# Patient Record
Sex: FEMALE | Race: OTHER | Employment: UNEMPLOYED | URBAN - NONMETROPOLITAN AREA
[De-identification: names, ages, dates, MRNs, and addresses within clinical notes are randomized per-mention and may not be internally consistent; named-entity substitution may affect disease eponyms.]

---

## 2016-12-12 LAB
CHOLESTEROL, TOTAL: 197 MG/DL
CHOLESTEROL/HDL RATIO: 2.4
HBA1C MFR BLD: 5.7 %
HDLC SERPL-MCNC: 82 MG/DL (ref 35–70)
LDL CHOLESTEROL CALCULATED: 97 MG/DL (ref 0–160)
TRIGL SERPL-MCNC: 87 MG/DL
VLDLC SERPL CALC-MCNC: ABNORMAL MG/DL

## 2017-01-07 ENCOUNTER — NURSE TRIAGE (OUTPATIENT)
Dept: ADMINISTRATIVE | Age: 52
End: 2017-01-07

## 2017-01-17 ENCOUNTER — OFFICE VISIT (OUTPATIENT)
Dept: FAMILY MEDICINE CLINIC | Age: 52
End: 2017-01-17

## 2017-01-17 ENCOUNTER — TELEPHONE (OUTPATIENT)
Dept: FAMILY MEDICINE CLINIC | Age: 52
End: 2017-01-17

## 2017-01-17 VITALS
TEMPERATURE: 98.4 F | SYSTOLIC BLOOD PRESSURE: 120 MMHG | WEIGHT: 127 LBS | HEART RATE: 80 BPM | DIASTOLIC BLOOD PRESSURE: 84 MMHG | BODY MASS INDEX: 24.8 KG/M2 | RESPIRATION RATE: 20 BRPM

## 2017-01-17 DIAGNOSIS — J01.00 SUBACUTE MAXILLARY SINUSITIS: Primary | ICD-10-CM

## 2017-01-17 DIAGNOSIS — F06.31 DEPRESSIVE DISORDER DUE TO ANOTHER MEDICAL CONDITION WITH DEPRESSIVE FEATURES: ICD-10-CM

## 2017-01-17 DIAGNOSIS — G89.29 CHRONIC NECK AND BACK PAIN: ICD-10-CM

## 2017-01-17 DIAGNOSIS — H65.00 ACUTE SEROUS OTITIS MEDIA, RECURRENCE NOT SPECIFIED, UNSPECIFIED LATERALITY: ICD-10-CM

## 2017-01-17 DIAGNOSIS — M54.9 CHRONIC NECK AND BACK PAIN: ICD-10-CM

## 2017-01-17 DIAGNOSIS — E03.8 OTHER SPECIFIED HYPOTHYROIDISM: Chronic | ICD-10-CM

## 2017-01-17 DIAGNOSIS — R31.9 HEMATURIA: ICD-10-CM

## 2017-01-17 DIAGNOSIS — M54.2 CHRONIC NECK AND BACK PAIN: ICD-10-CM

## 2017-01-17 LAB
BILIRUBIN, POC: ABNORMAL
BLOOD URINE, POC: ABNORMAL
CLARITY, POC: CLEAR
COLOR, POC: YELLOW
GLUCOSE URINE, POC: ABNORMAL
KETONES, POC: ABNORMAL
LEUKOCYTE EST, POC: ABNORMAL
NITRITE, POC: ABNORMAL
PH, POC: 7
PROTEIN, POC: ABNORMAL
SPECIFIC GRAVITY, POC: 1.02
UROBILINOGEN, POC: 0.2

## 2017-01-17 PROCEDURE — 96372 THER/PROPH/DIAG INJ SC/IM: CPT | Performed by: NURSE PRACTITIONER

## 2017-01-17 PROCEDURE — 81003 URINALYSIS AUTO W/O SCOPE: CPT | Performed by: NURSE PRACTITIONER

## 2017-01-17 PROCEDURE — 99213 OFFICE O/P EST LOW 20 MIN: CPT | Performed by: NURSE PRACTITIONER

## 2017-01-17 RX ORDER — ALPRAZOLAM 0.5 MG/1
0.5 TABLET ORAL 3 TIMES DAILY PRN
Qty: 90 TABLET | Refills: 0 | Status: SHIPPED | OUTPATIENT
Start: 2017-01-17 | End: 2017-05-08 | Stop reason: SDUPTHER

## 2017-01-17 RX ORDER — METHYLPREDNISOLONE ACETATE 80 MG/ML
120 INJECTION, SUSPENSION INTRA-ARTICULAR; INTRALESIONAL; INTRAMUSCULAR; SOFT TISSUE ONCE
Status: COMPLETED | OUTPATIENT
Start: 2017-01-17 | End: 2017-01-17

## 2017-01-17 RX ORDER — CIPROFLOXACIN 500 MG/1
500 TABLET, FILM COATED ORAL 2 TIMES DAILY
Qty: 20 TABLET | Refills: 0 | Status: SHIPPED | OUTPATIENT
Start: 2017-01-17 | End: 2017-01-27

## 2017-01-17 RX ORDER — DIAZEPAM 5 MG/1
5 TABLET ORAL EVERY 12 HOURS PRN
Qty: 30 TABLET | Refills: 0 | Status: SHIPPED | OUTPATIENT
Start: 2017-01-17 | End: 2017-05-08 | Stop reason: SDUPTHER

## 2017-01-17 RX ADMIN — METHYLPREDNISOLONE ACETATE 120 MG: 80 INJECTION, SUSPENSION INTRA-ARTICULAR; INTRALESIONAL; INTRAMUSCULAR; SOFT TISSUE at 14:58

## 2017-01-19 ASSESSMENT — ENCOUNTER SYMPTOMS
GASTROINTESTINAL NEGATIVE: 1
SINUS PRESSURE: 1
ALLERGIC/IMMUNOLOGIC NEGATIVE: 1
BACK PAIN: 1
SINUS COMPLAINT: 1
FACIAL SWELLING: 1
SHORTNESS OF BREATH: 0
COUGH: 1

## 2017-01-31 ENCOUNTER — TELEPHONE (OUTPATIENT)
Dept: FAMILY MEDICINE CLINIC | Age: 52
End: 2017-01-31

## 2017-01-31 RX ORDER — LEVOTHYROXINE SODIUM 112 UG/1
TABLET ORAL
Qty: 90 TABLET | Refills: 3 | Status: SHIPPED | OUTPATIENT
Start: 2017-01-31 | End: 2018-02-13 | Stop reason: SDUPTHER

## 2017-01-31 RX ORDER — LEVOTHYROXINE SODIUM 112 UG/1
TABLET ORAL
Qty: 90 TABLET | Refills: 3 | OUTPATIENT
Start: 2017-01-31

## 2017-02-01 RX ORDER — CYCLOBENZAPRINE HCL 10 MG
10 TABLET ORAL EVERY 8 HOURS PRN
Qty: 30 TABLET | Refills: 0 | Status: SHIPPED | OUTPATIENT
Start: 2017-02-01 | End: 2017-02-11

## 2017-03-03 ENCOUNTER — TELEPHONE (OUTPATIENT)
Dept: FAMILY MEDICINE CLINIC | Age: 52
End: 2017-03-03

## 2017-03-22 ENCOUNTER — OFFICE VISIT (OUTPATIENT)
Dept: PHYSICAL MEDICINE AND REHAB | Age: 52
End: 2017-03-22

## 2017-03-22 VITALS — HEIGHT: 60 IN | WEIGHT: 126 LBS | BODY MASS INDEX: 24.74 KG/M2

## 2017-03-22 DIAGNOSIS — M47.26 OSTEOARTHRITIS OF SPINE WITH RADICULOPATHY, LUMBAR REGION: ICD-10-CM

## 2017-03-22 DIAGNOSIS — M54.12 CERVICAL RADICULITIS: Primary | ICD-10-CM

## 2017-03-22 DIAGNOSIS — M96.1 FAILED BACK SYNDROME, CERVICAL: ICD-10-CM

## 2017-03-22 DIAGNOSIS — M54.16 LUMBAR RADICULITIS: ICD-10-CM

## 2017-03-22 DIAGNOSIS — M79.18 CERVICAL MYOFASCIAL PAIN SYNDROME: ICD-10-CM

## 2017-03-22 DIAGNOSIS — G89.4 CHRONIC PAIN SYNDROME: ICD-10-CM

## 2017-03-22 PROCEDURE — 99213 OFFICE O/P EST LOW 20 MIN: CPT | Performed by: PAIN MEDICINE

## 2017-03-22 ASSESSMENT — ENCOUNTER SYMPTOMS
PHOTOPHOBIA: 0
NAUSEA: 0
ABDOMINAL PAIN: 0
EYE PAIN: 0
WHEEZING: 0
VOMITING: 0
COLOR CHANGE: 0
SHORTNESS OF BREATH: 0
CONSTIPATION: 0
SINUS PRESSURE: 0
RHINORRHEA: 0
SORE THROAT: 0
DIARRHEA: 0
BACK PAIN: 0
COUGH: 0
CHEST TIGHTNESS: 0

## 2017-05-05 RX ORDER — DIAZEPAM 5 MG/1
5 TABLET ORAL EVERY 12 HOURS PRN
Qty: 30 TABLET | Refills: 0 | Status: CANCELLED | OUTPATIENT
Start: 2017-05-05

## 2017-05-05 RX ORDER — CYCLOBENZAPRINE HCL 10 MG
10 TABLET ORAL NIGHTLY
Qty: 10 TABLET | Refills: 0 | Status: CANCELLED | OUTPATIENT
Start: 2017-05-05 | End: 2017-05-15

## 2017-05-05 RX ORDER — ALPRAZOLAM 0.5 MG/1
0.5 TABLET ORAL 3 TIMES DAILY PRN
Qty: 90 TABLET | Refills: 0 | Status: CANCELLED | OUTPATIENT
Start: 2017-05-05

## 2017-05-08 ENCOUNTER — OFFICE VISIT (OUTPATIENT)
Dept: FAMILY MEDICINE CLINIC | Age: 52
End: 2017-05-08

## 2017-05-08 VITALS
TEMPERATURE: 98.5 F | SYSTOLIC BLOOD PRESSURE: 120 MMHG | WEIGHT: 128 LBS | RESPIRATION RATE: 16 BRPM | DIASTOLIC BLOOD PRESSURE: 78 MMHG | BODY MASS INDEX: 25.13 KG/M2 | HEART RATE: 84 BPM

## 2017-05-08 DIAGNOSIS — I10 ESSENTIAL HYPERTENSION: Chronic | ICD-10-CM

## 2017-05-08 DIAGNOSIS — B35.4 TINEA CORPORIS: ICD-10-CM

## 2017-05-08 DIAGNOSIS — M96.1 FAILED NECK SYNDROME: ICD-10-CM

## 2017-05-08 DIAGNOSIS — F32.A DEPRESSION, UNSPECIFIED DEPRESSION TYPE: ICD-10-CM

## 2017-05-08 DIAGNOSIS — J30.2 SEASONAL ALLERGIC RHINITIS, UNSPECIFIED ALLERGIC RHINITIS TRIGGER: ICD-10-CM

## 2017-05-08 DIAGNOSIS — Z13.9 SCREENING: ICD-10-CM

## 2017-05-08 DIAGNOSIS — M96.1 FAILED BACK SYNDROME: ICD-10-CM

## 2017-05-08 DIAGNOSIS — E03.8 OTHER SPECIFIED HYPOTHYROIDISM: Chronic | ICD-10-CM

## 2017-05-08 DIAGNOSIS — F41.1 GENERALIZED ANXIETY DISORDER: Primary | ICD-10-CM

## 2017-05-08 DIAGNOSIS — G47.9 SLEEP DISTURBANCE: ICD-10-CM

## 2017-05-08 DIAGNOSIS — M51.36 DDD (DEGENERATIVE DISC DISEASE), LUMBAR: ICD-10-CM

## 2017-05-08 PROCEDURE — 96372 THER/PROPH/DIAG INJ SC/IM: CPT | Performed by: NURSE PRACTITIONER

## 2017-05-08 PROCEDURE — 99213 OFFICE O/P EST LOW 20 MIN: CPT | Performed by: NURSE PRACTITIONER

## 2017-05-08 RX ORDER — CYCLOBENZAPRINE HCL 10 MG
10 TABLET ORAL NIGHTLY PRN
COMMUNITY
End: 2017-05-08 | Stop reason: SDUPTHER

## 2017-05-08 RX ORDER — METHYLPREDNISOLONE ACETATE 80 MG/ML
120 INJECTION, SUSPENSION INTRA-ARTICULAR; INTRALESIONAL; INTRAMUSCULAR; SOFT TISSUE ONCE
Status: COMPLETED | OUTPATIENT
Start: 2017-05-08 | End: 2017-05-08

## 2017-05-08 RX ORDER — BUPROPION HYDROCHLORIDE 100 MG/1
100 TABLET ORAL 2 TIMES DAILY
Qty: 60 TABLET | Refills: 3 | Status: SHIPPED | OUTPATIENT
Start: 2017-05-08 | End: 2017-11-20

## 2017-05-08 RX ORDER — CYCLOBENZAPRINE HCL 10 MG
10 TABLET ORAL NIGHTLY PRN
Qty: 30 TABLET | Refills: 1 | Status: SHIPPED | OUTPATIENT
Start: 2017-05-08 | End: 2017-07-17 | Stop reason: SDUPTHER

## 2017-05-08 RX ORDER — KETOCONAZOLE 20 MG/G
CREAM TOPICAL
Qty: 30 G | Refills: 1 | Status: SHIPPED | OUTPATIENT
Start: 2017-05-08 | End: 2017-08-23 | Stop reason: ALTCHOICE

## 2017-05-08 RX ORDER — LISINOPRIL 20 MG/1
20 TABLET ORAL DAILY
Qty: 30 TABLET | Refills: 5 | Status: SHIPPED | OUTPATIENT
Start: 2017-05-08 | End: 2018-02-20 | Stop reason: SDUPTHER

## 2017-05-08 RX ORDER — DIAZEPAM 5 MG/1
5 TABLET ORAL EVERY 12 HOURS PRN
Qty: 30 TABLET | Refills: 0 | Status: SHIPPED | OUTPATIENT
Start: 2017-05-08 | End: 2017-07-17 | Stop reason: SDUPTHER

## 2017-05-08 RX ORDER — ALPRAZOLAM 0.5 MG/1
0.5 TABLET ORAL 3 TIMES DAILY PRN
Qty: 90 TABLET | Refills: 0 | Status: SHIPPED | OUTPATIENT
Start: 2017-05-08 | End: 2017-07-17 | Stop reason: SDUPTHER

## 2017-05-08 RX ADMIN — METHYLPREDNISOLONE ACETATE 120 MG: 80 INJECTION, SUSPENSION INTRA-ARTICULAR; INTRALESIONAL; INTRAMUSCULAR; SOFT TISSUE at 12:11

## 2017-05-11 ASSESSMENT — ENCOUNTER SYMPTOMS
EYES NEGATIVE: 1
BACK PAIN: 1
SINUS COMPLAINT: 1
GASTROINTESTINAL NEGATIVE: 1
RESPIRATORY NEGATIVE: 1

## 2017-07-18 RX ORDER — CYCLOBENZAPRINE HCL 10 MG
10 TABLET ORAL NIGHTLY PRN
Qty: 30 TABLET | Refills: 1 | Status: SHIPPED | OUTPATIENT
Start: 2017-07-18 | End: 2017-09-18 | Stop reason: SDUPTHER

## 2017-07-18 RX ORDER — ALPRAZOLAM 0.5 MG/1
0.5 TABLET ORAL 3 TIMES DAILY PRN
Qty: 90 TABLET | Refills: 0 | Status: SHIPPED | OUTPATIENT
Start: 2017-07-18 | End: 2017-09-18 | Stop reason: SDUPTHER

## 2017-07-18 RX ORDER — DIAZEPAM 5 MG/1
5 TABLET ORAL EVERY 12 HOURS PRN
Qty: 30 TABLET | Refills: 0 | Status: SHIPPED | OUTPATIENT
Start: 2017-07-18 | End: 2017-09-18 | Stop reason: SDUPTHER

## 2017-08-23 ENCOUNTER — OFFICE VISIT (OUTPATIENT)
Dept: FAMILY MEDICINE CLINIC | Age: 52
End: 2017-08-23
Payer: COMMERCIAL

## 2017-08-23 VITALS
WEIGHT: 126 LBS | BODY MASS INDEX: 24.74 KG/M2 | DIASTOLIC BLOOD PRESSURE: 80 MMHG | SYSTOLIC BLOOD PRESSURE: 130 MMHG | RESPIRATION RATE: 16 BRPM | HEART RATE: 76 BPM

## 2017-08-23 DIAGNOSIS — M85.89 OSTEOPENIA OF MULTIPLE SITES: ICD-10-CM

## 2017-08-23 DIAGNOSIS — Z12.11 SCREEN FOR COLON CANCER: ICD-10-CM

## 2017-08-23 DIAGNOSIS — M54.17 LUMBOSACRAL RADICULOPATHY: Primary | ICD-10-CM

## 2017-08-23 DIAGNOSIS — K59.04 CHRONIC IDIOPATHIC CONSTIPATION: ICD-10-CM

## 2017-08-23 DIAGNOSIS — Z12.39 SCREENING FOR BREAST CANCER: ICD-10-CM

## 2017-08-23 DIAGNOSIS — E03.9 HYPOTHYROIDISM, UNSPECIFIED TYPE: Chronic | ICD-10-CM

## 2017-08-23 PROCEDURE — 96372 THER/PROPH/DIAG INJ SC/IM: CPT | Performed by: NURSE PRACTITIONER

## 2017-08-23 PROCEDURE — 99214 OFFICE O/P EST MOD 30 MIN: CPT | Performed by: NURSE PRACTITIONER

## 2017-08-23 RX ORDER — METHYLPREDNISOLONE ACETATE 80 MG/ML
120 INJECTION, SUSPENSION INTRA-ARTICULAR; INTRALESIONAL; INTRAMUSCULAR; SOFT TISSUE ONCE
Status: COMPLETED | OUTPATIENT
Start: 2017-08-23 | End: 2017-08-23

## 2017-08-23 RX ADMIN — METHYLPREDNISOLONE ACETATE 120 MG: 80 INJECTION, SUSPENSION INTRA-ARTICULAR; INTRALESIONAL; INTRAMUSCULAR; SOFT TISSUE at 12:41

## 2017-08-25 DIAGNOSIS — Z12.11 SCREEN FOR COLON CANCER: ICD-10-CM

## 2017-08-25 LAB
CONTROL: PRESENT
HEMOCCULT STL QL: NEGATIVE

## 2017-08-25 PROCEDURE — 82274 ASSAY TEST FOR BLOOD FECAL: CPT | Performed by: NURSE PRACTITIONER

## 2017-08-29 ENCOUNTER — TELEPHONE (OUTPATIENT)
Dept: FAMILY MEDICINE CLINIC | Age: 52
End: 2017-08-29

## 2017-08-31 ASSESSMENT — ENCOUNTER SYMPTOMS
BACK PAIN: 1
CONSTIPATION: 1

## 2017-09-18 RX ORDER — BUTALBITAL, ACETAMINOPHEN AND CAFFEINE 50; 325; 40 MG/1; MG/1; MG/1
1-2 TABLET ORAL EVERY 6 HOURS PRN
Qty: 180 TABLET | Refills: 1 | Status: SHIPPED | OUTPATIENT
Start: 2017-09-18 | End: 2018-02-20 | Stop reason: SDUPTHER

## 2017-09-18 RX ORDER — DIAZEPAM 5 MG/1
5 TABLET ORAL EVERY 12 HOURS PRN
Qty: 30 TABLET | Refills: 0 | Status: SHIPPED | OUTPATIENT
Start: 2017-09-18 | End: 2017-11-20 | Stop reason: SDUPTHER

## 2017-09-18 RX ORDER — CYCLOBENZAPRINE HCL 10 MG
10 TABLET ORAL NIGHTLY PRN
Qty: 30 TABLET | Refills: 1 | Status: SHIPPED | OUTPATIENT
Start: 2017-09-18 | End: 2018-08-14 | Stop reason: SDUPTHER

## 2017-09-18 RX ORDER — ALPRAZOLAM 0.5 MG/1
0.5 TABLET ORAL 3 TIMES DAILY PRN
Qty: 90 TABLET | Refills: 0 | Status: SHIPPED | OUTPATIENT
Start: 2017-09-18 | End: 2017-11-20 | Stop reason: SDUPTHER

## 2017-10-05 ENCOUNTER — HOSPITAL ENCOUNTER (OUTPATIENT)
Dept: WOMENS IMAGING | Age: 52
Discharge: HOME | End: 2017-10-05
Payer: COMMERCIAL

## 2017-10-05 DIAGNOSIS — M85.89 OSTEOPENIA OF MULTIPLE SITES: ICD-10-CM

## 2017-10-05 DIAGNOSIS — Z12.39 SCREENING FOR BREAST CANCER: ICD-10-CM

## 2017-10-05 PROCEDURE — 77063 BREAST TOMOSYNTHESIS BI: CPT

## 2017-10-05 PROCEDURE — 77080 DXA BONE DENSITY AXIAL: CPT

## 2017-10-17 ENCOUNTER — TELEPHONE (OUTPATIENT)
Dept: FAMILY MEDICINE CLINIC | Age: 52
End: 2017-10-17

## 2017-10-17 RX ORDER — PREDNISONE 1 MG/1
TABLET ORAL
Qty: 30 TABLET | Refills: 0 | Status: SHIPPED | OUTPATIENT
Start: 2017-10-17 | End: 2017-10-27

## 2017-10-17 NOTE — TELEPHONE ENCOUNTER
Beatriz Jacquie calls with a lot of back pain. She does not want to go to the emergency room because they won't do anything for her. She can barely move. Lays on the couch all day. Her left hip area is worse than before. She said Margi Peck gave her a shot a couple months ago so she thinks its too soon for another one. Using patches, Lidocaine, ice/heat, and nothing is helping. She is asking if she can get Prednisone or anything to help? Please advise.     [facility] on [address] [place] [address] 8/23/17

## 2017-11-08 ENCOUNTER — OFFICE VISIT (OUTPATIENT)
Dept: PSYCHIATRY | Age: 52
End: 2017-11-08
Payer: COMMERCIAL

## 2017-11-08 DIAGNOSIS — F06.31 DEPRESSIVE DISORDER DUE TO ANOTHER MEDICAL CONDITION WITH DEPRESSIVE FEATURES: Primary | ICD-10-CM

## 2017-11-08 PROCEDURE — 99204 OFFICE O/P NEW MOD 45 MIN: CPT | Performed by: NURSE PRACTITIONER

## 2017-11-08 RX ORDER — BUPROPION HYDROCHLORIDE 300 MG/1
300 TABLET ORAL EVERY MORNING
Qty: 30 TABLET | Refills: 0 | Status: SHIPPED | OUTPATIENT
Start: 2017-11-08 | End: 2017-12-13 | Stop reason: SDUPTHER

## 2017-11-08 NOTE — PROGRESS NOTES
1992    MSE:  Level of consciousness: Alert  Appearance: in chair and fair grooming   Behavior/Motor: no abnormalities noted   Attitude toward examiner: cooperative   Speech: Normal volume, goal directed, NRR  Mood: Euthymic  Affect: Reactive  Thought processes: Linear and goal directed   Suicidal Ideation: Denies suicidal ideations  Homicidal ideation: Denies homicidal ideations  Delusions: No evidence of delusions is observed  Perceptual Disturbance: Denies AH/VH;  No evidence of psychosis is observed. Cognition: Oriented to person, place, time and situation   Concentration fair   Memory intact   Insight: Fair  Judgment: Fair    ROS:  Constitutional: Negative for fever, chills and fatigue. HENT: Negative for ear pain, congestion, rhinorrhea and neck pain. Eyes: Negative for pain and discharge. Respiratory: Negative for cough, chest tightness and wheezing. Cardiovascular: Negative for chest pain, palpitations and leg swelling. Gastrointestinal: Negative for nausea, vomiting and diarrhea. Genitourinary: Negative for dysuria and frequency. Musculoskeletal: Negative for myalgias, back pain and arthralgias. Skin: Negative for rash. Neurological: Negative for dizziness, weakness and headaches. Impression:  Depressive D/O related to another medical condition    Plan:  Start Wellbutrin XL 300mg po q am  D/C Wellbutrin 100mg po BID  Advised to continue counseling  CBC with Diff, CMP, TSH.  Lipids, Vit D draw asap  RTC 4 weeks   Review of Systems    Objective:   Physical Exam    Assessment:            Plan:

## 2017-11-16 ENCOUNTER — TELEPHONE (OUTPATIENT)
Dept: FAMILY MEDICINE CLINIC | Age: 52
End: 2017-11-16

## 2017-11-16 RX ORDER — DIAZEPAM 5 MG/1
5 TABLET ORAL EVERY 12 HOURS PRN
Qty: 30 TABLET | Refills: 0 | OUTPATIENT
Start: 2017-11-16

## 2017-11-16 RX ORDER — ALPRAZOLAM 0.5 MG/1
0.5 TABLET ORAL 3 TIMES DAILY PRN
Qty: 90 TABLET | Refills: 0 | OUTPATIENT
Start: 2017-11-16

## 2017-11-20 ENCOUNTER — OFFICE VISIT (OUTPATIENT)
Dept: FAMILY MEDICINE CLINIC | Age: 52
End: 2017-11-20
Payer: COMMERCIAL

## 2017-11-20 VITALS
DIASTOLIC BLOOD PRESSURE: 80 MMHG | RESPIRATION RATE: 20 BRPM | TEMPERATURE: 98.4 F | SYSTOLIC BLOOD PRESSURE: 140 MMHG | HEIGHT: 61 IN | BODY MASS INDEX: 23.6 KG/M2 | WEIGHT: 125 LBS | HEART RATE: 68 BPM

## 2017-11-20 DIAGNOSIS — M96.1 FAILED BACK SYNDROME OF CERVICAL SPINE: ICD-10-CM

## 2017-11-20 DIAGNOSIS — F43.23 SITUATIONAL MIXED ANXIETY AND DEPRESSIVE DISORDER: ICD-10-CM

## 2017-11-20 DIAGNOSIS — M96.1 FAILED BACK SYNDROME, LUMBAR: ICD-10-CM

## 2017-11-20 DIAGNOSIS — J40 BRONCHITIS: Primary | ICD-10-CM

## 2017-11-20 DIAGNOSIS — J01.90 ACUTE SINUSITIS, RECURRENCE NOT SPECIFIED, UNSPECIFIED LOCATION: ICD-10-CM

## 2017-11-20 PROCEDURE — 99213 OFFICE O/P EST LOW 20 MIN: CPT | Performed by: NURSE PRACTITIONER

## 2017-11-20 RX ORDER — PREDNISONE 1 MG/1
TABLET ORAL
Qty: 30 TABLET | Refills: 0 | Status: SHIPPED | OUTPATIENT
Start: 2017-11-20 | End: 2017-11-30

## 2017-11-20 RX ORDER — BENZONATATE 200 MG/1
200 CAPSULE ORAL 3 TIMES DAILY PRN
Qty: 30 CAPSULE | Refills: 0 | Status: SHIPPED | OUTPATIENT
Start: 2017-11-20 | End: 2017-11-30

## 2017-11-20 RX ORDER — CEFUROXIME AXETIL 250 MG/1
250 TABLET ORAL 2 TIMES DAILY
Qty: 20 TABLET | Refills: 0 | Status: SHIPPED | OUTPATIENT
Start: 2017-11-20 | End: 2017-11-21

## 2017-11-20 RX ORDER — ALPRAZOLAM 0.5 MG/1
0.5 TABLET ORAL 3 TIMES DAILY PRN
Qty: 90 TABLET | Refills: 0 | Status: SHIPPED | OUTPATIENT
Start: 2017-11-20 | End: 2018-08-14

## 2017-11-20 RX ORDER — DIAZEPAM 5 MG/1
5 TABLET ORAL EVERY 12 HOURS PRN
Qty: 30 TABLET | Refills: 0 | Status: SHIPPED | OUTPATIENT
Start: 2017-11-20 | End: 2017-12-20 | Stop reason: SDUPTHER

## 2017-11-20 ASSESSMENT — PATIENT HEALTH QUESTIONNAIRE - PHQ9
SUM OF ALL RESPONSES TO PHQ9 QUESTIONS 1 & 2: 0
SUM OF ALL RESPONSES TO PHQ QUESTIONS 1-9: 0
1. LITTLE INTEREST OR PLEASURE IN DOING THINGS: 0
2. FEELING DOWN, DEPRESSED OR HOPELESS: 0

## 2017-11-21 RX ORDER — CIPROFLOXACIN 500 MG/1
500 TABLET, FILM COATED ORAL 2 TIMES DAILY
Qty: 20 TABLET | Refills: 0 | Status: SHIPPED | OUTPATIENT
Start: 2017-11-21 | End: 2017-12-01

## 2017-12-01 ENCOUNTER — HOSPITAL ENCOUNTER (OUTPATIENT)
Age: 52
Discharge: HOME | End: 2017-12-01
Payer: COMMERCIAL

## 2017-12-01 DIAGNOSIS — F06.31 DEPRESSIVE DISORDER DUE TO ANOTHER MEDICAL CONDITION WITH DEPRESSIVE FEATURES: ICD-10-CM

## 2017-12-01 LAB
ALBUMIN SERPL-MCNC: 4.7 G/DL (ref 3.5–5.1)
ALP BLD-CCNC: 69 U/L (ref 38–126)
ALT SERPL-CCNC: 10 U/L (ref 11–66)
ANION GAP SERPL CALCULATED.3IONS-SCNC: 13 MEQ/L (ref 8–16)
AST SERPL-CCNC: 16 U/L (ref 5–40)
BASOPHILS # BLD: 1.1 %
BASOPHILS ABSOLUTE: 0.1 THOU/MM3 (ref 0–0.1)
BILIRUB SERPL-MCNC: 0.2 MG/DL (ref 0.3–1.2)
BUN BLDV-MCNC: 11 MG/DL (ref 7–22)
CALCIUM SERPL-MCNC: 9.4 MG/DL (ref 8.5–10.5)
CHLORIDE BLD-SCNC: 104 MEQ/L (ref 98–111)
CHOLESTEROL, TOTAL: 242 MG/DL (ref 100–199)
CO2: 27 MEQ/L (ref 23–33)
CREAT SERPL-MCNC: 0.7 MG/DL (ref 0.4–1.2)
EOSINOPHIL # BLD: 9.1 %
EOSINOPHILS ABSOLUTE: 0.5 THOU/MM3 (ref 0–0.4)
GFR SERPL CREATININE-BSD FRML MDRD: 88 ML/MIN/1.73M2
GLUCOSE BLD-MCNC: 94 MG/DL (ref 70–108)
HCT VFR BLD CALC: 40.4 % (ref 37–47)
HDLC SERPL-MCNC: 106 MG/DL
HEMOGLOBIN: 13.3 GM/DL (ref 12–16)
LDL CHOLESTEROL CALCULATED: 120 MG/DL
LYMPHOCYTES # BLD: 30.8 %
LYMPHOCYTES ABSOLUTE: 1.7 THOU/MM3 (ref 1–4.8)
MCH RBC QN AUTO: 29.4 PG (ref 27–31)
MCHC RBC AUTO-ENTMCNC: 32.9 GM/DL (ref 33–37)
MCV RBC AUTO: 89.3 FL (ref 81–99)
MONOCYTES # BLD: 8.4 %
MONOCYTES ABSOLUTE: 0.5 THOU/MM3 (ref 0.4–1.3)
NUCLEATED RED BLOOD CELLS: 0 /100 WBC
PDW BLD-RTO: 14.1 % (ref 11.5–14.5)
PLATELET # BLD: 239 THOU/MM3 (ref 130–400)
PMV BLD AUTO: 10.4 MCM (ref 7.4–10.4)
POTASSIUM SERPL-SCNC: 3.8 MEQ/L (ref 3.5–5.2)
RBC # BLD: 4.53 MILL/MM3 (ref 4.2–5.4)
SEG NEUTROPHILS: 50.6 %
SEGMENTED NEUTROPHILS ABSOLUTE COUNT: 2.7 THOU/MM3 (ref 1.8–7.7)
SODIUM BLD-SCNC: 144 MEQ/L (ref 135–145)
TOTAL PROTEIN: 7.3 G/DL (ref 6.1–8)
TRIGL SERPL-MCNC: 82 MG/DL (ref 0–199)
TSH SERPL DL<=0.05 MIU/L-ACNC: 0.79 UIU/ML (ref 0.4–4.2)
WBC # BLD: 5.4 THOU/MM3 (ref 4.8–10.8)

## 2017-12-01 PROCEDURE — 82652 VIT D 1 25-DIHYDROXY: CPT

## 2017-12-01 PROCEDURE — 80061 LIPID PANEL: CPT

## 2017-12-01 PROCEDURE — 36415 COLL VENOUS BLD VENIPUNCTURE: CPT

## 2017-12-01 PROCEDURE — 80050 GENERAL HEALTH PANEL: CPT

## 2017-12-02 ASSESSMENT — ENCOUNTER SYMPTOMS
GASTROINTESTINAL NEGATIVE: 1
ALLERGIC/IMMUNOLOGIC NEGATIVE: 1
COUGH: 1
EYES NEGATIVE: 1
SINUS PRESSURE: 1
CHEST TIGHTNESS: 1
BACK PAIN: 1

## 2017-12-02 NOTE — PROGRESS NOTES
Never Used      Comment: 1 pack per week intermit for 20 yrs    Alcohol use No      Current Outpatient Prescriptions   Medication Sig Dispense Refill    diazepam (VALIUM) 5 MG tablet Take 1 tablet by mouth every 12 hours as needed (spasms) . 30 tablet 0    ALPRAZolam (XANAX) 0.5 MG tablet Take 1 tablet by mouth 3 times daily as needed for Sleep . 90 tablet 0    buPROPion (WELLBUTRIN XL) 300 MG extended release tablet Take 1 tablet by mouth every morning 30 tablet 0    cyclobenzaprine (FLEXERIL) 10 MG tablet Take 1 tablet by mouth nightly as needed for Muscle spasms 30 tablet 1    butalbital-acetaminophen-caffeine (FIORICET, ESGIC) -40 MG per tablet Take 1-2 tablets by mouth every 6 hours as needed for Headaches 180 tablet 1    lisinopril (PRINIVIL;ZESTRIL) 20 MG tablet Take 1 tablet by mouth daily 30 tablet 5    levothyroxine (SYNTHROID) 112 MCG tablet TAKE ONE TABLET BY MOUTH ONCE DAILY 90 tablet 3    albuterol sulfate  (90 BASE) MCG/ACT inhaler Inhale 2 puffs into the lungs every 6 hours as needed for Wheezing 1 Inhaler 5    lidocaine (XYLOCAINE) 2 % jelly Apply 2-3 times daily 1 Tube 3    fluticasone (FLONASE) 50 MCG/ACT nasal spray 1 spray by Nasal route daily 1 Bottle 3     No current facility-administered medications for this visit.       Allergies   Allergen Reactions    Abilify [Aripiprazole] Other (See Comments)     Vivid dreams    Nucynta [Tapentadol Hcl Er] Other (See Comments)     Terrible headache    Cephalexin Nausea And Vomiting    Excedrin Migraine [Aspirin-Acetaminophen-Caffeine] Nausea And Vomiting    Neurontin [Gabapentin] Anxiety    Percocet [Oxycodone-Acetaminophen] Nausea And Vomiting    Tylenol [Acetaminophen] Nausea And Vomiting     TYLENOL #3    Vicodin [Hydrocodone-Acetaminophen] Nausea And Vomiting     Health Maintenance   Topic Date Due    DTaP/Tdap/Td vaccine (1 - Tdap) 06/27/1986    Pneumococcal med risk (1 of 1 - PPSV23) 06/27/1986    Cervical cancer identified., Medication contract signed today. Jones Ordoñez NP)  Impression/Plan:  1. Bronchitis    2. Acute sinusitis, recurrence not specified, unspecified location    3. Failed back syndrome of cervical spine    4. Failed back syndrome, lumbar    5. Situational mixed anxiety and depressive disorder      Requested Prescriptions     Signed Prescriptions Disp Refills    diazepam (VALIUM) 5 MG tablet 30 tablet 0     Sig: Take 1 tablet by mouth every 12 hours as needed (spasms) .  ALPRAZolam (XANAX) 0.5 MG tablet 90 tablet 0     Sig: Take 1 tablet by mouth 3 times daily as needed for Sleep .  predniSONE (DELTASONE) 5 MG tablet 30 tablet 0     Si po qd for 3 days, then 3 po qd for 3 days, then 2 po qd for 3 days, then 1 po qd for 3 days    benzonatate (TESSALON) 200 MG capsule 30 capsule 0     Sig: Take 1 capsule by mouth 3 times daily as needed for Cough    ciprofloxacin (CIPRO) 500 MG tablet 20 tablet 0     Sig: Take 1 tablet by mouth 2 times daily for 10 days     No orders of the defined types were placed in this encounter. Patient given educational materials - see patient instructions. Discussed use, benefit, and side effects of prescribed medications. All patient questions answered. Pt voiced understanding. Reviewed health maintenance. Patient agreed with treatment plan. Follow up as directed.           Electronically signed by Caryn Hatch NP on 2017 at 11:33 AM

## 2017-12-03 LAB — VITAMIN D 1,25-DIHYDROXY: 46.4 PG/ML (ref 19.9–79.3)

## 2017-12-06 ENCOUNTER — TELEPHONE (OUTPATIENT)
Dept: FAMILY MEDICINE CLINIC | Age: 52
End: 2017-12-06

## 2017-12-06 RX ORDER — AMOXICILLIN 500 MG/1
500 CAPSULE ORAL 3 TIMES DAILY
Qty: 30 CAPSULE | Refills: 0 | Status: SHIPPED | OUTPATIENT
Start: 2017-12-06 | End: 2017-12-14 | Stop reason: ALTCHOICE

## 2017-12-13 ENCOUNTER — OFFICE VISIT (OUTPATIENT)
Dept: PSYCHIATRY | Age: 52
End: 2017-12-13
Payer: COMMERCIAL

## 2017-12-13 DIAGNOSIS — F06.31 DEPRESSIVE DISORDER DUE TO ANOTHER MEDICAL CONDITION WITH DEPRESSIVE FEATURES: Primary | ICD-10-CM

## 2017-12-13 PROCEDURE — 99213 OFFICE O/P EST LOW 20 MIN: CPT | Performed by: NURSE PRACTITIONER

## 2017-12-13 RX ORDER — BUPROPION HYDROCHLORIDE 300 MG/1
300 TABLET ORAL EVERY MORNING
Qty: 30 TABLET | Refills: 1 | Status: SHIPPED | OUTPATIENT
Start: 2017-12-13 | End: 2018-01-17 | Stop reason: SDUPTHER

## 2017-12-13 NOTE — PROGRESS NOTES
Subjective:      Patient ID: Liberty Trinidad is a 50 y.o. female. [facility]  Lilly Raza is seen for follow up and  medication management. Reports Wellbutrin is working well for mild depression. Reports still has  chronic pain. Believes all her problems are related to the chronic pain. Denies having mood swings. Denies side effects from meds. Good med compliance is reported. Denies feelings of harm towards self or others. Reports counseling is going well at [facility]. Labs reviewed drawn 12-1-2017 and 12-1-2017. No critical abnormals noted. TSH lower end of normal. Reports currently having a URI and is currently on Amoxicillin. Reports this is second round. First round was Cipro. Reports Hx of Asthma. Advised to go to ER if no improvement or condition worsens.      Past Medical Hx:  Reports allergies to Tramadol. Codeine, Percocets, Vicodin  Reports having Hypothyroidism, Migraine Headaches, HTN. DDD, Asthma  Reports partial hysterectomy 2001     Past Psychiatric Hx:  Denies any past psych hospitalizations. Denies any past suicidal gestures. Denies ever being under care of psychiatrist. Reports receives counseling at [facility]. Currently on Wellbutrin, xanax Rxed by PCP and Valium Rxed by PCP. 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 MARITAL STATUS: Currently  2nd marriage. For 24 years. Reports relationship is good. OCCUPATION: Currently on Disability. Last worked at "[employer] left there 2015  [address]: Reports having associates degree in [employer]. LIVING SITUATION: Lives with  in [facility]. Has 2 grown children. LEGAL:  Reports past 3 DUI's.  Last was 1994     MSE:  Level of consciousness: Alert  Appearance: in chair and fair grooming   Behavior/Motor: no abnormalities noted

## 2017-12-14 ENCOUNTER — HOSPITAL ENCOUNTER (EMERGENCY)
Dept: GENERAL RADIOLOGY | Age: 52
Discharge: HOME | End: 2017-12-14
Payer: COMMERCIAL

## 2017-12-14 ENCOUNTER — HOSPITAL ENCOUNTER (EMERGENCY)
Age: 52
Discharge: HOME | End: 2017-12-14
Payer: COMMERCIAL

## 2017-12-14 VITALS
RESPIRATION RATE: 16 BRPM | SYSTOLIC BLOOD PRESSURE: 137 MMHG | HEART RATE: 84 BPM | OXYGEN SATURATION: 99 % | TEMPERATURE: 99.4 F | DIASTOLIC BLOOD PRESSURE: 85 MMHG | BODY MASS INDEX: 23.05 KG/M2 | WEIGHT: 122 LBS

## 2017-12-14 DIAGNOSIS — J20.9 MILD INTERMITTENT ACUTE BRONCHITIS WITH ASTHMA WITH ACUTE EXACERBATION: Primary | ICD-10-CM

## 2017-12-14 DIAGNOSIS — J01.81 OTHER ACUTE RECURRENT SINUSITIS: ICD-10-CM

## 2017-12-14 DIAGNOSIS — J45.21 MILD INTERMITTENT ACUTE BRONCHITIS WITH ASTHMA WITH ACUTE EXACERBATION: Primary | ICD-10-CM

## 2017-12-14 PROCEDURE — 99214 OFFICE O/P EST MOD 30 MIN: CPT

## 2017-12-14 PROCEDURE — 99214 OFFICE O/P EST MOD 30 MIN: CPT | Performed by: NURSE PRACTITIONER

## 2017-12-14 PROCEDURE — 71020 XR CHEST STANDARD TWO VW: CPT

## 2017-12-14 RX ORDER — DOXYCYCLINE HYCLATE 100 MG/1
100 CAPSULE ORAL 2 TIMES DAILY
Qty: 14 CAPSULE | Refills: 0 | Status: SHIPPED | OUTPATIENT
Start: 2017-12-14 | End: 2017-12-21

## 2017-12-14 RX ORDER — PREDNISONE 20 MG/1
20 TABLET ORAL 2 TIMES DAILY
Qty: 10 TABLET | Refills: 0 | Status: SHIPPED | OUTPATIENT
Start: 2017-12-14 | End: 2017-12-19

## 2017-12-14 RX ORDER — DEXTROMETHORPHAN HYDROBROMIDE AND PROMETHAZINE HYDROCHLORIDE 15; 6.25 MG/5ML; MG/5ML
5 SYRUP ORAL 4 TIMES DAILY PRN
Qty: 35 ML | Refills: 0 | Status: SHIPPED | OUTPATIENT
Start: 2017-12-14 | End: 2017-12-21

## 2017-12-14 ASSESSMENT — ENCOUNTER SYMPTOMS
STRIDOR: 0
WHEEZING: 0
SINUS PAIN: 0
SORE THROAT: 0
TROUBLE SWALLOWING: 0
FACIAL SWELLING: 0
RHINORRHEA: 0
VOICE CHANGE: 0
SINUS PRESSURE: 1
COUGH: 1
SHORTNESS OF BREATH: 0
CHEST TIGHTNESS: 1

## 2017-12-14 ASSESSMENT — PAIN DESCRIPTION - PAIN TYPE: TYPE: ACUTE PAIN

## 2017-12-14 ASSESSMENT — PAIN DESCRIPTION - FREQUENCY: FREQUENCY: CONTINUOUS

## 2017-12-14 ASSESSMENT — PAIN DESCRIPTION - DESCRIPTORS: DESCRIPTORS: TIGHTNESS

## 2017-12-14 ASSESSMENT — PAIN SCALES - GENERAL: PAINLEVEL_OUTOF10: 4

## 2017-12-14 ASSESSMENT — PAIN DESCRIPTION - LOCATION: LOCATION: CHEST

## 2017-12-14 NOTE — ED PROVIDER NOTES
history that includes Hysterectomy (4/02); cervical fusion (11/09); and Fulguration Minor Bladder Lesion (with o (2011). CURRENT MEDICATIONS       Discharge Medication List as of 12/14/2017  2:39 PM      CONTINUE these medications which have NOT CHANGED    Details   buPROPion (WELLBUTRIN XL) 300 MG extended release tablet Take 1 tablet by mouth every morning, Disp-30 tablet, R-1Normal      diazepam (VALIUM) 5 MG tablet Take 1 tablet by mouth every 12 hours as needed (spasms) . , Disp-30 tablet, R-0Print      ALPRAZolam (XANAX) 0.5 MG tablet Take 1 tablet by mouth 3 times daily as needed for Sleep ., Disp-90 tablet, R-0Print      cyclobenzaprine (FLEXERIL) 10 MG tablet Take 1 tablet by mouth nightly as needed for Muscle spasms, Disp-30 tablet, R-1Normal      butalbital-acetaminophen-caffeine (FIORICET, ESGIC) -40 MG per tablet Take 1-2 tablets by mouth every 6 hours as needed for Headaches, Disp-180 tablet, R-1Normal      lisinopril (PRINIVIL;ZESTRIL) 20 MG tablet Take 1 tablet by mouth daily, Disp-30 tablet, R-5Normal      levothyroxine (SYNTHROID) 112 MCG tablet TAKE ONE TABLET BY MOUTH ONCE DAILY, Disp-90 tablet, R-3      albuterol sulfate  (90 BASE) MCG/ACT inhaler Inhale 2 puffs into the lungs every 6 hours as needed for Wheezing, Disp-1 Inhaler, R-5      lidocaine (XYLOCAINE) 2 % jelly Apply 2-3 times daily, Disp-1 Tube, R-3, Normal      fluticasone (FLONASE) 50 MCG/ACT nasal spray 1 spray by Nasal route daily, Disp-1 Bottle, R-3             ALLERGIES     Patient is is allergic to abilify [aripiprazole]; nucynta [tapentadol hcl er]; cephalexin; codeine; excedrin migraine [aspirin-acetaminophen-caffeine]; neurontin [gabapentin]; percocet [oxycodone-acetaminophen]; and vicodin [hydrocodone-acetaminophen]. FAMILY HISTORY     Patient's family history includes Diabetes in her father; High Blood Pressure in her brother, father, and sister; Rheum Arthritis in her mother;  Thyroid Disease in her brother and mother. SOCIAL HISTORY     Patient  reports that she has quit smoking. Her smoking use included Cigarettes. She has never used smokeless tobacco. She reports that she does not drink alcohol or use drugs. PHYSICAL EXAM     ED TRIAGE VITALS  BP: 137/85, Temp: 99.4 °F (37.4 °C), Pulse: 84, Resp: 16, SpO2: 99 %  Physical Exam   Constitutional: She is oriented to person, place, and time. Vital signs are normal. She appears well-developed and well-nourished. Non-toxic appearance. She does not have a sickly appearance. She does not appear ill. No distress. HENT:   Head: Normocephalic and atraumatic. Right Ear: Hearing, tympanic membrane, external ear and ear canal normal.   Left Ear: Hearing, tympanic membrane, external ear and ear canal normal.   Nose: Mucosal edema present. No rhinorrhea or sinus tenderness. Right sinus exhibits no maxillary sinus tenderness and no frontal sinus tenderness. Left sinus exhibits no maxillary sinus tenderness and no frontal sinus tenderness. Mouth/Throat: Uvula is midline, oropharynx is clear and moist and mucous membranes are normal.   Eyes: Conjunctivae are normal. Right eye exhibits no discharge. Left eye exhibits no discharge. No scleral icterus. Neck: Normal range of motion. Neck supple. Cardiovascular: Normal rate, regular rhythm, S1 normal, S2 normal and normal heart sounds. No murmur heard. Pulmonary/Chest: Effort normal and breath sounds normal. No accessory muscle usage or stridor. No tachypnea and no bradypnea. No respiratory distress. She has no decreased breath sounds. She has no wheezes. She has no rhonchi. She has no rales. She exhibits tenderness. She exhibits no bony tenderness, no crepitus, no edema, no deformity, no swelling and no retraction. Lymphadenopathy:        Head (right side): No submental, no submandibular, no tonsillar, no preauricular and no posterior auricular adenopathy present.         Head (left side): No submental, no MEDICATIONS:  Discharge Medication List as of 12/14/2017  2:39 PM      START taking these medications    Details   doxycycline hyclate (VIBRAMYCIN) 100 MG capsule Take 1 capsule by mouth 2 times daily for 7 days, Disp-14 capsule, R-0Normal      predniSONE (DELTASONE) 20 MG tablet Take 1 tablet by mouth 2 times daily for 5 days, Disp-10 tablet, R-0Normal      promethazine-dextromethorphan (PROMETHAZINE-DM) 6.25-15 MG/5ML syrup Take 5 mLs by mouth 4 times daily as needed for Cough, Disp-35 mL, R-0Normal           Discharge Medication List as of 12/14/2017  2:39 PM          Patient given educational materials - see patient instructions. Discussed use, benefit, and side effects of prescribed medications. All patient questions answered. Pt voiced understanding. Reviewed health maintenance. Patient agreed with treatment plan. Follow up as directed.      Precious Allen, [facility]  12/14/17 0064

## 2017-12-20 RX ORDER — DIAZEPAM 5 MG/1
5 TABLET ORAL EVERY 12 HOURS PRN
Qty: 60 TABLET | Refills: 0 | Status: SHIPPED | OUTPATIENT
Start: 2017-12-20 | End: 2018-02-20 | Stop reason: SDUPTHER

## 2017-12-20 NOTE — TELEPHONE ENCOUNTER
Last Valium Rx was 11/20/17 for #30 with the directions of every 12 hrs prn. Pt was also given a Rx for Xanax 0.5mg tid prn #90 on 11/20/17 as well. OARRS per provider. Message below states she is only asking for valium, is she to be on both?

## 2018-01-08 ENCOUNTER — TELEPHONE (OUTPATIENT)
Dept: FAMILY MEDICINE CLINIC | Age: 53
End: 2018-01-08

## 2018-01-08 ENCOUNTER — HOSPITAL ENCOUNTER (EMERGENCY)
Age: 53
Discharge: HOME | End: 2018-01-08
Attending: EMERGENCY MEDICINE
Payer: COMMERCIAL

## 2018-01-08 ENCOUNTER — APPOINTMENT (OUTPATIENT)
Dept: GENERAL RADIOLOGY | Age: 53
End: 2018-01-08
Payer: COMMERCIAL

## 2018-01-08 VITALS
TEMPERATURE: 98.1 F | OXYGEN SATURATION: 100 % | HEART RATE: 97 BPM | RESPIRATION RATE: 24 BRPM | SYSTOLIC BLOOD PRESSURE: 167 MMHG | DIASTOLIC BLOOD PRESSURE: 81 MMHG

## 2018-01-08 DIAGNOSIS — J40 BRONCHITIS: ICD-10-CM

## 2018-01-08 DIAGNOSIS — J45.901 EXACERBATION OF ASTHMA, UNSPECIFIED ASTHMA SEVERITY, UNSPECIFIED WHETHER PERSISTENT: Primary | ICD-10-CM

## 2018-01-08 LAB
ANION GAP SERPL CALCULATED.3IONS-SCNC: 14 MEQ/L (ref 8–16)
BASOPHILS # BLD: 1.1 %
BASOPHILS ABSOLUTE: 0 THOU/MM3 (ref 0–0.1)
BUN BLDV-MCNC: 7 MG/DL (ref 7–22)
CALCIUM SERPL-MCNC: 9.1 MG/DL (ref 8.5–10.5)
CHLORIDE BLD-SCNC: 106 MEQ/L (ref 98–111)
CO2: 24 MEQ/L (ref 23–33)
CREAT SERPL-MCNC: 0.5 MG/DL (ref 0.4–1.2)
D-DIMER QUANTITATIVE: 295 NG/ML FEU (ref 0–500)
EKG ATRIAL RATE: 91 BPM
EKG P AXIS: 72 DEGREES
EKG P-R INTERVAL: 134 MS
EKG Q-T INTERVAL: 366 MS
EKG QRS DURATION: 78 MS
EKG QTC CALCULATION (BAZETT): 450 MS
EKG R AXIS: 23 DEGREES
EKG T AXIS: 39 DEGREES
EKG VENTRICULAR RATE: 91 BPM
EOSINOPHIL # BLD: 7.5 %
EOSINOPHILS ABSOLUTE: 0.3 THOU/MM3 (ref 0–0.4)
GFR SERPL CREATININE-BSD FRML MDRD: > 90 ML/MIN/1.73M2
GLUCOSE BLD-MCNC: 106 MG/DL (ref 70–108)
HCT VFR BLD CALC: 39.3 % (ref 37–47)
HEMOGLOBIN: 13.2 GM/DL (ref 12–16)
LYMPHOCYTES # BLD: 37.6 %
LYMPHOCYTES ABSOLUTE: 1.6 THOU/MM3 (ref 1–4.8)
MCH RBC QN AUTO: 30.2 PG (ref 27–31)
MCHC RBC AUTO-ENTMCNC: 33.7 GM/DL (ref 33–37)
MCV RBC AUTO: 89.8 FL (ref 81–99)
MONOCYTES # BLD: 8.1 %
MONOCYTES ABSOLUTE: 0.3 THOU/MM3 (ref 0.4–1.3)
NUCLEATED RED BLOOD CELLS: 0 /100 WBC
OSMOLALITY CALCULATION: 285.2 MOSMOL/KG (ref 275–300)
PDW BLD-RTO: 13.2 % (ref 11.5–14.5)
PLATELET # BLD: 243 THOU/MM3 (ref 130–400)
PMV BLD AUTO: 9.9 MCM (ref 7.4–10.4)
POTASSIUM SERPL-SCNC: 3.7 MEQ/L (ref 3.5–5.2)
PRO-BNP: 16.5 PG/ML (ref 0–900)
RBC # BLD: 4.38 MILL/MM3 (ref 4.2–5.4)
SEG NEUTROPHILS: 45.7 %
SEGMENTED NEUTROPHILS ABSOLUTE COUNT: 1.9 THOU/MM3 (ref 1.8–7.7)
SODIUM BLD-SCNC: 144 MEQ/L (ref 135–145)
TROPONIN T: < 0.01 NG/ML
WBC # BLD: 4.2 THOU/MM3 (ref 4.8–10.8)

## 2018-01-08 PROCEDURE — 83880 ASSAY OF NATRIURETIC PEPTIDE: CPT

## 2018-01-08 PROCEDURE — 85025 COMPLETE CBC W/AUTO DIFF WBC: CPT

## 2018-01-08 PROCEDURE — 94640 AIRWAY INHALATION TREATMENT: CPT

## 2018-01-08 PROCEDURE — 85379 FIBRIN DEGRADATION QUANT: CPT

## 2018-01-08 PROCEDURE — 71046 X-RAY EXAM CHEST 2 VIEWS: CPT

## 2018-01-08 PROCEDURE — 84484 ASSAY OF TROPONIN QUANT: CPT

## 2018-01-08 PROCEDURE — 6370000000 HC RX 637 (ALT 250 FOR IP): Performed by: EMERGENCY MEDICINE

## 2018-01-08 PROCEDURE — 93005 ELECTROCARDIOGRAM TRACING: CPT

## 2018-01-08 PROCEDURE — 80048 BASIC METABOLIC PNL TOTAL CA: CPT

## 2018-01-08 PROCEDURE — 99285 EMERGENCY DEPT VISIT HI MDM: CPT

## 2018-01-08 PROCEDURE — 36415 COLL VENOUS BLD VENIPUNCTURE: CPT

## 2018-01-08 RX ORDER — CEFUROXIME AXETIL 250 MG/1
250 TABLET ORAL 2 TIMES DAILY
Qty: 20 TABLET | Refills: 0 | Status: SHIPPED | OUTPATIENT
Start: 2018-01-08 | End: 2018-01-18

## 2018-01-08 RX ORDER — IPRATROPIUM BROMIDE AND ALBUTEROL SULFATE 2.5; .5 MG/3ML; MG/3ML
1 SOLUTION RESPIRATORY (INHALATION) ONCE
Status: COMPLETED | OUTPATIENT
Start: 2018-01-08 | End: 2018-01-08

## 2018-01-08 RX ORDER — BUDESONIDE AND FORMOTEROL FUMARATE DIHYDRATE 160; 4.5 UG/1; UG/1
2 AEROSOL RESPIRATORY (INHALATION) 2 TIMES DAILY
Qty: 1 INHALER | Refills: 0 | Status: SHIPPED | OUTPATIENT
Start: 2018-01-08 | End: 2018-02-20

## 2018-01-08 RX ADMIN — Medication 2 PUFF: at 12:09

## 2018-01-08 RX ADMIN — IPRATROPIUM BROMIDE AND ALBUTEROL SULFATE 1 AMPULE: .5; 3 SOLUTION RESPIRATORY (INHALATION) at 12:04

## 2018-01-08 ASSESSMENT — PAIN SCALES - GENERAL: PAINLEVEL_OUTOF10: 5

## 2018-01-08 ASSESSMENT — PAIN DESCRIPTION - PAIN TYPE: TYPE: ACUTE PAIN

## 2018-01-08 ASSESSMENT — PAIN DESCRIPTION - LOCATION: LOCATION: RIB CAGE

## 2018-01-16 ENCOUNTER — NURSE TRIAGE (OUTPATIENT)
Dept: ADMINISTRATIVE | Age: 53
End: 2018-01-16

## 2018-01-16 NOTE — TELEPHONE ENCOUNTER
Reason for Disposition   [1] Symptoms of a \"yeast infection\" (i.e., itchy, white discharge, not bad smelling) AND [2] not improved > 3 days following CARE ADVICE    Answer Assessment - Initial Assessment Questions  1. DISCHARGE: \"Describe the discharge. \" (e.g., white, yellow, green, gray, foamy, cottage cheese-like)      Yellow   2. ODOR: \"Is there a bad odor? \"      Not yet  3. ONSET: \"When did the discharge begin? \"      yesterday  4. RASH: \"Is there a rash in that area? \" If so, ask: \"Describe it. \" (e.g., redness, blisters, sores, bumps)      no  5. ABDOMINAL PAIN: Joe Dies you having any abdominal pain? \" If yes: \"What does it feel like? \" (e.g., crampy, dull, intermittent, constant)       no  6. ABDOMINAL PAIN SEVERITY: If present, ask: \"How bad is it? \"  (e.g., mild, moderate, severe)   - MILD - doesn't interfere with normal activities    - MODERATE - interferes with normal activities or awakens from sleep    - SEVERE - patient doesn't want to move (R/O peritonitis)       no  7. CAUSE: \"What do you think is causing the discharge? \"      Yeast infection   8. OTHER SYMPTOMS: \"Do you have any other symptoms? \" (e.g., fever, itching, vaginal bleeding, pain with urination)      No n/a  9. PREGNANCY: \"Is there any chance you are pregnant? \" \"When was your last menstrual period? \"      *No Answer*    Protocols used: [facility]

## 2018-01-17 ENCOUNTER — OFFICE VISIT (OUTPATIENT)
Dept: PSYCHIATRY | Age: 53
End: 2018-01-17
Payer: COMMERCIAL

## 2018-01-17 DIAGNOSIS — F06.31 DEPRESSIVE DISORDER DUE TO ANOTHER MEDICAL CONDITION WITH DEPRESSIVE FEATURES: Primary | ICD-10-CM

## 2018-01-17 PROCEDURE — 99213 OFFICE O/P EST LOW 20 MIN: CPT | Performed by: NURSE PRACTITIONER

## 2018-01-17 RX ORDER — BUPROPION HYDROCHLORIDE 300 MG/1
300 TABLET ORAL EVERY MORNING
Qty: 30 TABLET | Refills: 1 | Status: SHIPPED | OUTPATIENT
Start: 2018-01-17 | End: 2018-02-28 | Stop reason: SDUPTHER

## 2018-01-17 NOTE — PROGRESS NOTES
Subjective:      Patient ID: Bo Anand is a 50 y.o. female. HPI  Tutu Alonso is seen for follow up and  medication management. Reports Wellbutrin is working well for mild depression. Reports having mood swings and irritability. Reports having these symptoms of and on for years. Reports still has  chronic pain. Still believes all her problems are related to the chronic back and neck pain. Currently Xanax and Valium Rx by PCP. Denies having mood swings. Denies side effects from meds. Good med compliance is reported. Denies feelings of harm towards self or others. Reports counseling is going well at [facility]. Labs reviewed drawn 12-1-2017 and 12-1-2017. No critical abnormals noted. TSH lower end of normal. Reports URI is better. Reports Hx of Asthma. Advised to go to ER if no improvement or condition worsens.      Past Medical Hx:  Reports allergies to Tramadol. Codeine, Percocets, Vicodin  Reports having Hypothyroidism, Migraine Headaches, HTN. DDD, Asthma  Reports partial hysterectomy 2001     Past Psychiatric Hx:  Denies any past psych hospitalizations. Denies any past suicidal gestures. Denies ever being under care of psychiatrist. Reports receives counseling at [facility]. Currently xanax Rxed by PCP and Valium Rxed by PCP. 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 MARITAL STATUS: Currently  2nd marriage. For 24 years. Reports relationship is good. OCCUPATION: Currently on Disability. Last worked at [employer] left there 2015  [address]: Reports having associates degree in [employer]. LIVING SITUATION: Lives with  in [facility]. Has 2 grown children. LEGAL:  Reports past 3 DUI's.  Last was 1994     MSE:  Level of consciousness: Alert  Appearance: in chair and fair grooming Behavior/Motor: no abnormalities noted   Attitude toward examiner: cooperative   Speech: Normal volume, goal directed, NRR  Mood: Euthymic  Affect: Reactive  Thought processes: Linear and goal directed   Suicidal Ideation: Denies suicidal ideations  Homicidal ideation: Denies homicidal ideations  Delusions: No evidence of delusions is observed  Perceptual Disturbance: Denies AH/VH;  No evidence of psychosis is observed. Cognition: Oriented to person, place, time and situation   Concentration fair   Memory intact   Insight: Poor  Judgment: Poor     ROS:  Constitutional: Negative for fever, chills and fatigue. HENT: Negative for ear pain, congestion, rhinorrhea and neck pain. Eyes: Negative for pain and discharge. Respiratory: Negative for cough, chest tightness and wheezing. Cardiovascular: Negative for chest pain, palpitations and leg swelling. Gastrointestinal: Negative for nausea, vomiting and diarrhea. Genitourinary: Negative for dysuria and frequency. Musculoskeletal: Negative for myalgias, back pain and arthralgias. Skin: Negative for rash. Neurological: Negative for dizziness, weakness and headaches.      Impression:  Depressive D/O related to another medical condition  Possible Bipolar II D/O     Plan:  Start Lamictal starter pack for C/O mood swings and irritability. Braulio Anaya cautioned that if a rash occurs while on the Lamictal. To stop this med. Go to ER then call this office. Braulio Anaya voiced understanding of caution.    Wellbutrin XL 300mg po q am  Advised to continue counseling  RTC 6 weeks   Review of Systems    Objective:   Physical Exam    Assessment:            Plan:

## 2018-01-21 NOTE — ED PROVIDER NOTES
[address] [address]   eMERGENCY dEPARTMENT eNCOUnter        [address] [facility]    Chief Complaint   Patient presents with    Shortness of Breath       Nurses Notes reviewed and I agree except as noted in the HPI. HPI    Ronel Arauz is a 50 y.o. female who presents for evaluation of Shortness of breath for the past 3-4 days. Patient had history of asthma, and uses albuterol inhaler from time to time about 3-4 times a day 5 days a week. Lately patient had developed a cold congestion without any fever, no chills, no chest pain, no vomiting or diarrhea    REVIEW OF SYSTEMS    Review of Systems   Constitutional: Negative for appetite change, chills, diaphoresis, fatigue and fever. HENT: Positive for congestion, postnasal drip and sneezing. Negative for ear pain, rhinorrhea, sinus pressure, sore throat, trouble swallowing and voice change. Respiratory: Positive for cough and shortness of breath. Negative for chest tightness and wheezing. Cardiovascular: Negative for chest pain, palpitations and leg swelling. Gastrointestinal: Negative for abdominal pain, constipation, diarrhea, nausea and vomiting. Musculoskeletal: Negative for arthralgias, back pain, joint swelling, myalgias, neck pain and neck stiffness. Neurological: Negative for dizziness, syncope, weakness, light-headedness, numbness and headaches. PAST MEDICAL HISTORY     has a past medical history of Asthma; Degenerative disc disease; Hypertension; Hypothyroidism; Interstitial cystitis; Iritis; and Osteopenia. SURGICAL HISTORY     has a past surgical history that includes Hysterectomy (4/02); cervical fusion (11/09); and Fulguration Minor Bladder Lesion (with o (2011). CURRENT MEDICATIONS    Discharge Medication List as of 1/8/2018  2:11 PM      CONTINUE these medications which have NOT CHANGED    Details   diazepam (VALIUM) 5 MG tablet Take 1 tablet by mouth every 12 hours as needed (spasms) . , Disp-60 tablet, R-0Print      buPROPion (WELLBUTRIN XL) 300 MG extended release tablet Take 1 tablet by mouth every morning, Disp-30 tablet, R-1Normal      ALPRAZolam (XANAX) 0.5 MG tablet Take 1 tablet by mouth 3 times daily as needed for Sleep ., Disp-90 tablet, R-0Print      cyclobenzaprine (FLEXERIL) 10 MG tablet Take 1 tablet by mouth nightly as needed for Muscle spasms, Disp-30 tablet, R-1Normal      butalbital-acetaminophen-caffeine (FIORICET, ESGIC) -40 MG per tablet Take 1-2 tablets by mouth every 6 hours as needed for Headaches, Disp-180 tablet, R-1Normal      lisinopril (PRINIVIL;ZESTRIL) 20 MG tablet Take 1 tablet by mouth daily, Disp-30 tablet, R-5Normal      levothyroxine (SYNTHROID) 112 MCG tablet TAKE ONE TABLET BY MOUTH ONCE DAILY, Disp-90 tablet, R-3      albuterol sulfate  (90 BASE) MCG/ACT inhaler Inhale 2 puffs into the lungs every 6 hours as needed for Wheezing, Disp-1 Inhaler, R-5      lidocaine (XYLOCAINE) 2 % jelly Apply 2-3 times daily, Disp-1 Tube, R-3, Normal      fluticasone (FLONASE) 50 MCG/ACT nasal spray 1 spray by Nasal route daily, Disp-1 Bottle, R-3             ALLERGIES    is allergic to abilify [aripiprazole]; nucynta [tapentadol hcl er]; cephalexin; codeine; excedrin migraine [aspirin-acetaminophen-caffeine]; neurontin [gabapentin]; percocet [oxycodone-acetaminophen]; and vicodin [hydrocodone-acetaminophen]. FAMILY HISTORY    indicated that her mother is alive. She indicated that her father is alive. She indicated that her sister is alive. She indicated that both of her brothers are alive. family history includes Diabetes in her father; High Blood Pressure in her brother, father, and sister; Rheum Arthritis in her mother; Thyroid Disease in her brother and mother. SOCIAL HISTORY     reports that she has quit smoking. Her smoking use included Cigarettes.  She has never used smokeless tobacco. She reports that she does not drink alcohol or use CRITICAL CARE:   None. CONSULTS:  None    PROCEDURES:  None. FINAL IMPRESSION       1. Exacerbation of asthma, unspecified asthma severity, unspecified whether persistent    2.  Bronchitis          DISPOSITION/PLAN  PATIENT REFERRED TO:  Chris Krishna NP  [address] [address]  [phone]    Schedule an appointment as soon as possible for a visit in 3 days      [address] [facility] [address] EMERGENCY DEPT  [address] [address]  [phone]    As needed, If symptoms worsen    DISCHARGE MEDICATIONS:  Discharge Medication List as of 1/8/2018  2:11 PM      START taking these medications    Details   cefUROXime (CEFTIN) 250 MG tablet Take 1 tablet by mouth 2 times daily for 10 days, Disp-20 tablet, R-0Print      budesonide-formoterol (SYMBICORT) 160-4.5 MCG/ACT AERO Inhale 2 puffs into the lungs 2 times daily, Disp-1 Inhaler, R-0Print             (Please note that portions of this note were completed with a voice recognition program.  Efforts were made to edit the dictations but occasionally words are mis-transcribed.)         01/21/18 2:27 PM      Pham Jean MD        Emergency room physician           Pham Jean MD  01/24/18 4717

## 2018-01-24 ENCOUNTER — TELEPHONE (OUTPATIENT)
Dept: FAMILY MEDICINE CLINIC | Age: 53
End: 2018-01-24

## 2018-01-24 RX ORDER — AZITHROMYCIN 250 MG/1
TABLET, FILM COATED ORAL
Qty: 6 TABLET | Refills: 0 | Status: SHIPPED | OUTPATIENT
Start: 2018-01-24 | End: 2018-02-03

## 2018-01-24 ASSESSMENT — ENCOUNTER SYMPTOMS
COUGH: 1
CONSTIPATION: 0
NAUSEA: 0
VOICE CHANGE: 0
DIARRHEA: 0
VOMITING: 0
SHORTNESS OF BREATH: 1
WHEEZING: 0
SINUS PRESSURE: 0
RHINORRHEA: 0
TROUBLE SWALLOWING: 0
BACK PAIN: 0
SORE THROAT: 0
ABDOMINAL PAIN: 0
CHEST TIGHTNESS: 0

## 2018-02-13 RX ORDER — LEVOTHYROXINE SODIUM 112 UG/1
TABLET ORAL
Qty: 90 TABLET | Refills: 3 | Status: SHIPPED | OUTPATIENT
Start: 2018-02-13 | End: 2019-02-11 | Stop reason: SDUPTHER

## 2018-02-20 ENCOUNTER — TELEPHONE (OUTPATIENT)
Dept: FAMILY MEDICINE CLINIC | Age: 53
End: 2018-02-20

## 2018-02-20 ENCOUNTER — OFFICE VISIT (OUTPATIENT)
Dept: FAMILY MEDICINE CLINIC | Age: 53
End: 2018-02-20
Payer: COMMERCIAL

## 2018-02-20 VITALS
DIASTOLIC BLOOD PRESSURE: 84 MMHG | TEMPERATURE: 98.1 F | WEIGHT: 120 LBS | BODY MASS INDEX: 22.66 KG/M2 | RESPIRATION RATE: 20 BRPM | SYSTOLIC BLOOD PRESSURE: 124 MMHG | HEIGHT: 61 IN | HEART RATE: 84 BPM | OXYGEN SATURATION: 98 %

## 2018-02-20 DIAGNOSIS — I10 ESSENTIAL HYPERTENSION: ICD-10-CM

## 2018-02-20 DIAGNOSIS — R51.9 CHRONIC INTRACTABLE HEADACHE, UNSPECIFIED HEADACHE TYPE: Primary | ICD-10-CM

## 2018-02-20 DIAGNOSIS — M96.1 FAILED BACK SYNDROME: ICD-10-CM

## 2018-02-20 DIAGNOSIS — G89.29 CHRONIC INTRACTABLE HEADACHE, UNSPECIFIED HEADACHE TYPE: Primary | ICD-10-CM

## 2018-02-20 DIAGNOSIS — J45.909 MILD REACTIVE AIRWAYS DISEASE, UNSPECIFIED WHETHER PERSISTENT: ICD-10-CM

## 2018-02-20 DIAGNOSIS — M96.1 FAILED NECK SYNDROME: ICD-10-CM

## 2018-02-20 DIAGNOSIS — R06.02 SOB (SHORTNESS OF BREATH) ON EXERTION: ICD-10-CM

## 2018-02-20 PROCEDURE — 99214 OFFICE O/P EST MOD 30 MIN: CPT | Performed by: NURSE PRACTITIONER

## 2018-02-20 RX ORDER — LISINOPRIL 20 MG/1
20 TABLET ORAL DAILY
Qty: 30 TABLET | Refills: 5 | Status: SHIPPED | OUTPATIENT
Start: 2018-02-20 | End: 2018-08-14 | Stop reason: SDUPTHER

## 2018-02-20 RX ORDER — DIAZEPAM 5 MG/1
5 TABLET ORAL EVERY 12 HOURS PRN
Qty: 60 TABLET | Refills: 0 | Status: SHIPPED | OUTPATIENT
Start: 2018-02-20 | End: 2018-05-02 | Stop reason: SDUPTHER

## 2018-02-20 RX ORDER — BUTALBITAL, ACETAMINOPHEN AND CAFFEINE 50; 325; 40 MG/1; MG/1; MG/1
1-2 TABLET ORAL EVERY 6 HOURS PRN
Qty: 180 TABLET | Refills: 1 | Status: SHIPPED | OUTPATIENT
Start: 2018-02-20 | End: 2018-06-18 | Stop reason: SDUPTHER

## 2018-02-20 RX ORDER — LAMOTRIGINE 100 MG/1
100 TABLET ORAL DAILY
COMMUNITY
End: 2018-02-28 | Stop reason: SDUPTHER

## 2018-02-26 ASSESSMENT — ENCOUNTER SYMPTOMS
BACK PAIN: 1
COUGH: 1
WHEEZING: 1
ALLERGIC/IMMUNOLOGIC NEGATIVE: 1
EYES NEGATIVE: 1
CHEST TIGHTNESS: 1
SHORTNESS OF BREATH: 1

## 2018-02-27 NOTE — PROGRESS NOTES
[facility]  [facility]  [address] [place] [address] Rd., [address] [id number]  Dept: [phone]  Dept Fax: [phone]: [phone]  PROGRESS NOTE      Visit Date: 2/26/2018    Matt Cole is a 50 y.o. female who presents today for:  Chief Complaint   Patient presents with    3 Month Follow-Up     3 month follow up on meds    Shortness of Breath     Has been put on Symbicort thru the ER but it was too expensive, also been on 5 rounds of antibiotics with no relief. Subjective:   C/o continued sob past 2 months. Tx with multiple atbs per ED. Continued symptoms. Hx rad, chronic neck lower back pain. Refills. No fever, cp, syncope. Review of Systems   Constitutional: Positive for fatigue. Negative for fever. HENT: Positive for congestion. Eyes: Negative. Respiratory: Positive for cough, chest tightness, shortness of breath and wheezing. Cardiovascular: Negative. Endocrine: Negative. Genitourinary: Negative. Musculoskeletal: Positive for back pain and neck pain. Skin: Negative. Allergic/Immunologic: Negative. Neurological: Positive for headaches. Hematological: Negative. Psychiatric/Behavioral: Negative.       Past Medical History:   Diagnosis Date    Asthma     Degenerative disc disease     Hypertension     Hypothyroidism     Interstitial cystitis     Iritis 2013    OU      Osteopenia       Past Surgical History:   Procedure Laterality Date    CERVICAL FUSION  11/09    c 4,5,6    FULGURATION MINOR BLADDER LESION (W OR W/O BIOPSY)  2011    HYSTERECTOMY  4/02     Family History   Problem Relation Age of Onset    Rheum Arthritis Mother     Thyroid Disease Mother     High Blood Pressure Father     Diabetes Father     High Blood Pressure Sister     Thyroid Disease Brother     High Blood Pressure Brother      Social History   Substance Use Topics    Smoking status: Former Smoker     Types: Cigarettes    Smokeless tobacco:

## 2018-02-28 ENCOUNTER — OFFICE VISIT (OUTPATIENT)
Dept: PSYCHIATRY | Age: 53
End: 2018-02-28
Payer: COMMERCIAL

## 2018-02-28 DIAGNOSIS — F06.31 DEPRESSIVE DISORDER DUE TO ANOTHER MEDICAL CONDITION WITH DEPRESSIVE FEATURES: Primary | ICD-10-CM

## 2018-02-28 PROCEDURE — 99213 OFFICE O/P EST LOW 20 MIN: CPT | Performed by: NURSE PRACTITIONER

## 2018-02-28 RX ORDER — LAMOTRIGINE 150 MG/1
150 TABLET ORAL DAILY
Qty: 30 TABLET | Refills: 1 | Status: SHIPPED | OUTPATIENT
Start: 2018-02-28 | End: 2018-04-04 | Stop reason: SDUPTHER

## 2018-02-28 RX ORDER — BUPROPION HYDROCHLORIDE 300 MG/1
300 TABLET ORAL EVERY MORNING
Qty: 30 TABLET | Refills: 1 | Status: SHIPPED | OUTPATIENT
Start: 2018-02-28 | End: 2018-04-04 | Stop reason: SDUPTHER

## 2018-03-13 ENCOUNTER — HOSPITAL ENCOUNTER (OUTPATIENT)
Dept: MRI IMAGING | Age: 53
Discharge: HOME | End: 2018-03-13
Payer: COMMERCIAL

## 2018-03-13 ENCOUNTER — HOSPITAL ENCOUNTER (OUTPATIENT)
Dept: CT IMAGING | Age: 53
Discharge: HOME | End: 2018-03-13
Payer: COMMERCIAL

## 2018-03-13 DIAGNOSIS — Z47.89 AFTERCARE FOLLOWING SURGERY OF THE MUSCULOSKELETAL SYSTEM: ICD-10-CM

## 2018-03-13 DIAGNOSIS — G44.86 CERVICOGENIC HEADACHE: ICD-10-CM

## 2018-03-13 DIAGNOSIS — M54.12 RADICULOPATHY OF CERVICAL REGION: ICD-10-CM

## 2018-03-13 PROCEDURE — 72125 CT NECK SPINE W/O DYE: CPT

## 2018-03-13 PROCEDURE — 72141 MRI NECK SPINE W/O DYE: CPT

## 2018-03-27 ENCOUNTER — HOSPITAL ENCOUNTER (OUTPATIENT)
Dept: PULMONOLOGY | Age: 53
Discharge: HOME | End: 2018-03-27
Payer: COMMERCIAL

## 2018-03-27 DIAGNOSIS — R06.02 SOB (SHORTNESS OF BREATH) ON EXERTION: ICD-10-CM

## 2018-03-27 DIAGNOSIS — J45.909 MILD REACTIVE AIRWAYS DISEASE, UNSPECIFIED WHETHER PERSISTENT: ICD-10-CM

## 2018-03-27 PROCEDURE — 94010 BREATHING CAPACITY TEST: CPT

## 2018-03-27 PROCEDURE — 94726 PLETHYSMOGRAPHY LUNG VOLUMES: CPT

## 2018-04-03 ENCOUNTER — TELEPHONE (OUTPATIENT)
Dept: FAMILY MEDICINE CLINIC | Age: 53
End: 2018-04-03

## 2018-04-04 ENCOUNTER — OFFICE VISIT (OUTPATIENT)
Dept: PSYCHIATRY | Age: 53
End: 2018-04-04
Payer: COMMERCIAL

## 2018-04-04 DIAGNOSIS — F06.31 DEPRESSIVE DISORDER DUE TO ANOTHER MEDICAL CONDITION WITH DEPRESSIVE FEATURES: Primary | ICD-10-CM

## 2018-04-04 PROCEDURE — 99213 OFFICE O/P EST LOW 20 MIN: CPT | Performed by: NURSE PRACTITIONER

## 2018-04-04 RX ORDER — LAMOTRIGINE 200 MG/1
200 TABLET ORAL DAILY
Qty: 30 TABLET | Refills: 1 | Status: SHIPPED | OUTPATIENT
Start: 2018-04-04 | End: 2018-05-02 | Stop reason: SDUPTHER

## 2018-04-04 RX ORDER — BUPROPION HYDROCHLORIDE 300 MG/1
300 TABLET ORAL EVERY MORNING
Qty: 30 TABLET | Refills: 1 | Status: SHIPPED | OUTPATIENT
Start: 2018-04-04 | End: 2018-05-02 | Stop reason: SDUPTHER

## 2018-04-25 ENCOUNTER — HOSPITAL ENCOUNTER (OUTPATIENT)
Dept: PHYSICAL THERAPY | Age: 53
Setting detail: THERAPIES SERIES
Discharge: HOME | End: 2018-04-25
Payer: COMMERCIAL

## 2018-04-25 PROCEDURE — 97162 PT EVAL MOD COMPLEX 30 MIN: CPT

## 2018-04-25 ASSESSMENT — PAIN DESCRIPTION - ORIENTATION: ORIENTATION: RIGHT;LEFT

## 2018-04-25 ASSESSMENT — PAIN SCALES - GENERAL: PAINLEVEL_OUTOF10: 8

## 2018-04-25 ASSESSMENT — PAIN DESCRIPTION - LOCATION: LOCATION: NECK;ARM;SHOULDER

## 2018-04-30 ENCOUNTER — HOSPITAL ENCOUNTER (OUTPATIENT)
Dept: PHYSICAL THERAPY | Age: 53
Setting detail: THERAPIES SERIES
Discharge: HOME | End: 2018-04-30
Payer: COMMERCIAL

## 2018-05-02 ENCOUNTER — OFFICE VISIT (OUTPATIENT)
Dept: PSYCHIATRY | Age: 53
End: 2018-05-02
Payer: COMMERCIAL

## 2018-05-02 ENCOUNTER — HOSPITAL ENCOUNTER (OUTPATIENT)
Dept: PHYSICAL THERAPY | Age: 53
Setting detail: THERAPIES SERIES
Discharge: HOME | End: 2018-05-02
Payer: COMMERCIAL

## 2018-05-02 DIAGNOSIS — F06.31 DEPRESSIVE DISORDER DUE TO ANOTHER MEDICAL CONDITION WITH DEPRESSIVE FEATURES: Primary | ICD-10-CM

## 2018-05-02 PROCEDURE — G0283 ELEC STIM OTHER THAN WOUND: HCPCS

## 2018-05-02 PROCEDURE — 99213 OFFICE O/P EST LOW 20 MIN: CPT | Performed by: NURSE PRACTITIONER

## 2018-05-02 RX ORDER — BUPROPION HYDROCHLORIDE 300 MG/1
300 TABLET ORAL EVERY MORNING
Qty: 30 TABLET | Refills: 2 | Status: SHIPPED | OUTPATIENT
Start: 2018-05-02 | End: 2018-08-08 | Stop reason: SDUPTHER

## 2018-05-02 RX ORDER — LAMOTRIGINE 200 MG/1
200 TABLET ORAL DAILY
Qty: 30 TABLET | Refills: 2 | Status: SHIPPED | OUTPATIENT
Start: 2018-05-02 | End: 2018-08-08 | Stop reason: SDUPTHER

## 2018-05-02 RX ORDER — DIAZEPAM 5 MG/1
5 TABLET ORAL EVERY 12 HOURS PRN
Qty: 60 TABLET | Refills: 0 | Status: SHIPPED | OUTPATIENT
Start: 2018-05-02 | End: 2018-05-21 | Stop reason: SDUPTHER

## 2018-05-02 ASSESSMENT — PAIN DESCRIPTION - LOCATION: LOCATION: NECK

## 2018-05-02 ASSESSMENT — PAIN SCALES - GENERAL: PAINLEVEL_OUTOF10: 7

## 2018-05-02 ASSESSMENT — PAIN DESCRIPTION - ORIENTATION: ORIENTATION: RIGHT;LEFT

## 2018-05-07 ENCOUNTER — HOSPITAL ENCOUNTER (OUTPATIENT)
Dept: PHYSICAL THERAPY | Age: 53
Setting detail: THERAPIES SERIES
Discharge: HOME | End: 2018-05-07
Payer: COMMERCIAL

## 2018-05-07 PROCEDURE — G0283 ELEC STIM OTHER THAN WOUND: HCPCS

## 2018-05-07 ASSESSMENT — PAIN DESCRIPTION - ORIENTATION: ORIENTATION: RIGHT;LEFT

## 2018-05-07 ASSESSMENT — PAIN SCALES - GENERAL: PAINLEVEL_OUTOF10: 5

## 2018-05-07 ASSESSMENT — PAIN DESCRIPTION - LOCATION: LOCATION: NECK

## 2018-05-09 ENCOUNTER — HOSPITAL ENCOUNTER (OUTPATIENT)
Dept: PHYSICAL THERAPY | Age: 53
Setting detail: THERAPIES SERIES
Discharge: HOME | End: 2018-05-09
Payer: COMMERCIAL

## 2018-05-09 PROCEDURE — G0283 ELEC STIM OTHER THAN WOUND: HCPCS

## 2018-05-09 ASSESSMENT — PAIN SCALES - GENERAL: PAINLEVEL_OUTOF10: 7

## 2018-05-09 ASSESSMENT — PAIN DESCRIPTION - ORIENTATION: ORIENTATION: RIGHT;LEFT

## 2018-05-09 ASSESSMENT — PAIN DESCRIPTION - LOCATION: LOCATION: NECK

## 2018-05-14 ENCOUNTER — HOSPITAL ENCOUNTER (OUTPATIENT)
Dept: PHYSICAL THERAPY | Age: 53
Setting detail: THERAPIES SERIES
Discharge: HOME | End: 2018-05-14
Payer: COMMERCIAL

## 2018-05-16 ENCOUNTER — HOSPITAL ENCOUNTER (OUTPATIENT)
Dept: PHYSICAL THERAPY | Age: 53
Setting detail: THERAPIES SERIES
Discharge: HOME | End: 2018-05-16
Payer: COMMERCIAL

## 2018-05-16 PROCEDURE — 97110 THERAPEUTIC EXERCISES: CPT

## 2018-05-16 ASSESSMENT — PAIN DESCRIPTION - ORIENTATION: ORIENTATION: RIGHT;LEFT

## 2018-05-16 ASSESSMENT — PAIN DESCRIPTION - LOCATION: LOCATION: NECK

## 2018-05-16 ASSESSMENT — PAIN SCALES - GENERAL: PAINLEVEL_OUTOF10: 9

## 2018-05-21 ENCOUNTER — OFFICE VISIT (OUTPATIENT)
Dept: FAMILY MEDICINE CLINIC | Age: 53
End: 2018-05-21
Payer: COMMERCIAL

## 2018-05-21 ENCOUNTER — TELEPHONE (OUTPATIENT)
Dept: FAMILY MEDICINE CLINIC | Age: 53
End: 2018-05-21

## 2018-05-21 VITALS
BODY MASS INDEX: 23.56 KG/M2 | DIASTOLIC BLOOD PRESSURE: 86 MMHG | HEART RATE: 60 BPM | SYSTOLIC BLOOD PRESSURE: 136 MMHG | HEIGHT: 60 IN | RESPIRATION RATE: 12 BRPM | WEIGHT: 120 LBS | TEMPERATURE: 98.1 F

## 2018-05-21 DIAGNOSIS — R06.02 SOB (SHORTNESS OF BREATH): Primary | ICD-10-CM

## 2018-05-21 DIAGNOSIS — F33.1 MODERATE EPISODE OF RECURRENT MAJOR DEPRESSIVE DISORDER (HCC): ICD-10-CM

## 2018-05-21 DIAGNOSIS — M54.16 LUMBAR RADICULOPATHY: ICD-10-CM

## 2018-05-21 DIAGNOSIS — M96.1 FAILED NECK SYNDROME: ICD-10-CM

## 2018-05-21 DIAGNOSIS — M96.1 FAILED BACK SYNDROME: ICD-10-CM

## 2018-05-21 DIAGNOSIS — M54.12 CERVICAL RADICULOPATHY: ICD-10-CM

## 2018-05-21 PROCEDURE — 99213 OFFICE O/P EST LOW 20 MIN: CPT | Performed by: NURSE PRACTITIONER

## 2018-05-21 RX ORDER — DIAZEPAM 5 MG/1
5 TABLET ORAL EVERY 8 HOURS PRN
Qty: 90 TABLET | Refills: 0 | Status: SHIPPED | OUTPATIENT
Start: 2018-05-21 | End: 2018-08-14 | Stop reason: SDUPTHER

## 2018-05-29 ASSESSMENT — ENCOUNTER SYMPTOMS
RESPIRATORY NEGATIVE: 1
EYES NEGATIVE: 1
BACK PAIN: 1

## 2018-06-19 RX ORDER — BUTALBITAL, ACETAMINOPHEN AND CAFFEINE 50; 325; 40 MG/1; MG/1; MG/1
TABLET ORAL
Qty: 180 TABLET | Refills: 1 | Status: SHIPPED | OUTPATIENT
Start: 2018-06-19 | End: 2018-10-03 | Stop reason: SDUPTHER

## 2018-08-08 ENCOUNTER — OFFICE VISIT (OUTPATIENT)
Dept: PSYCHIATRY | Age: 53
End: 2018-08-08
Payer: COMMERCIAL

## 2018-08-08 DIAGNOSIS — F31.81 BIPOLAR II DISORDER (HCC): Primary | ICD-10-CM

## 2018-08-08 PROCEDURE — 99213 OFFICE O/P EST LOW 20 MIN: CPT | Performed by: NURSE PRACTITIONER

## 2018-08-08 RX ORDER — BUPROPION HYDROCHLORIDE 300 MG/1
300 TABLET ORAL EVERY MORNING
Qty: 30 TABLET | Refills: 2 | Status: SHIPPED | OUTPATIENT
Start: 2018-08-08 | End: 2018-11-14 | Stop reason: SDUPTHER

## 2018-08-08 RX ORDER — LAMOTRIGINE 200 MG/1
200 TABLET ORAL DAILY
Qty: 30 TABLET | Refills: 2 | Status: SHIPPED | OUTPATIENT
Start: 2018-08-08 | End: 2018-11-14 | Stop reason: SDUPTHER

## 2018-08-08 NOTE — PROGRESS NOTES
Subjective:      Patient ID: Georges Farfan is a 48 y.o. female. HPI  Ivis Tineo is seen for follow up and  medication management. Feels meds are working well. Gregary Fail side effects. Denies having a rash. Good med compliance is reported. Denies mood swings. Denies depression. Denies feelings of harm towards self or others. Feels pain is still primary  for all her issues.  was unable to do  physical therapy. States was told by another ortho surgeon their is nothing that can be done for her back. Reports being told she would have to live with this. Reports still  doing well without counseling. Reports she and  are getting along well. Labs reviewed drawn 12-1-2017 and 12-1-2017. No critical abnormals noted. TSH lower end of normal.  Reports being on Advair for her asthma and is working well. Reports being on Valium and Xanax Rxed by PCP for anxiety and sleep. Client advised would not be Rxed these meds from from this office.      Past Medical Hx:  Reports allergies to Tramadol. Codeine, Percocets, Vicodin  Reports having Hypothyroidism, Migraine Headaches, HTN. DDD, Asthma, Osteoarthritis  Reports partial hysterectomy 2001     Past Psychiatric Hx:  Denies any past psych hospitalizations. Denies any past suicidal gestures. Denies ever being under care of psychiatrist. Reports receives counseling at [facility]. Currently xanax Rxed by PCP and Valium Rxed by PCP. 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 MARITAL STATUS: Currently  2nd marriage. For 24 years. Reports relationship is good. OCCUPATION: Currently on Disability. Last worked at [employer] left there 2015  [address]: Reports having associates degree in [employer].   LIVING SITUATION: Lives with

## 2018-08-14 ENCOUNTER — OFFICE VISIT (OUTPATIENT)
Dept: FAMILY MEDICINE CLINIC | Age: 53
End: 2018-08-14
Payer: COMMERCIAL

## 2018-08-14 VITALS
RESPIRATION RATE: 16 BRPM | WEIGHT: 123 LBS | TEMPERATURE: 98.6 F | HEART RATE: 80 BPM | BODY MASS INDEX: 24.02 KG/M2 | DIASTOLIC BLOOD PRESSURE: 80 MMHG | SYSTOLIC BLOOD PRESSURE: 130 MMHG

## 2018-08-14 DIAGNOSIS — G44.86 HEADACHE, CERVICOGENIC: ICD-10-CM

## 2018-08-14 DIAGNOSIS — M96.1 FAILED BACK SYNDROME: ICD-10-CM

## 2018-08-14 DIAGNOSIS — M96.1 FAILED NECK SYNDROME: Primary | ICD-10-CM

## 2018-08-14 DIAGNOSIS — J45.20 MILD INTERMITTENT REACTIVE AIRWAY DISEASE WITHOUT COMPLICATION: ICD-10-CM

## 2018-08-14 DIAGNOSIS — L28.0 NEURODERMATITIS: ICD-10-CM

## 2018-08-14 DIAGNOSIS — F33.41 RECURRENT MAJOR DEPRESSIVE DISORDER, IN PARTIAL REMISSION (HCC): ICD-10-CM

## 2018-08-14 DIAGNOSIS — I10 ESSENTIAL HYPERTENSION: ICD-10-CM

## 2018-08-14 PROCEDURE — 99213 OFFICE O/P EST LOW 20 MIN: CPT | Performed by: NURSE PRACTITIONER

## 2018-08-14 RX ORDER — ALPRAZOLAM 0.5 MG/1
0.5 TABLET ORAL 3 TIMES DAILY PRN
Qty: 90 TABLET | Refills: 0 | Status: CANCELLED | OUTPATIENT
Start: 2018-08-14 | End: 2018-09-13

## 2018-08-14 RX ORDER — LISINOPRIL 20 MG/1
20 TABLET ORAL DAILY
Qty: 30 TABLET | Refills: 5 | Status: SHIPPED | OUTPATIENT
Start: 2018-08-14 | End: 2020-12-03

## 2018-08-14 RX ORDER — DIAZEPAM 5 MG/1
5 TABLET ORAL EVERY 8 HOURS PRN
Qty: 90 TABLET | Refills: 0 | Status: SHIPPED | OUTPATIENT
Start: 2018-08-14 | End: 2018-10-24 | Stop reason: SDUPTHER

## 2018-08-14 RX ORDER — CYCLOBENZAPRINE HCL 10 MG
10 TABLET ORAL NIGHTLY PRN
Qty: 30 TABLET | Refills: 1 | Status: SHIPPED | OUTPATIENT
Start: 2018-08-14 | End: 2019-03-04 | Stop reason: SDUPTHER

## 2018-08-14 RX ORDER — ALBUTEROL SULFATE 90 UG/1
2 AEROSOL, METERED RESPIRATORY (INHALATION) EVERY 6 HOURS PRN
Qty: 1 INHALER | Refills: 5 | Status: SHIPPED | OUTPATIENT
Start: 2018-08-14 | End: 2019-11-12 | Stop reason: SDUPTHER

## 2018-08-21 ASSESSMENT — ENCOUNTER SYMPTOMS
EYES NEGATIVE: 1
GASTROINTESTINAL NEGATIVE: 1
BACK PAIN: 1
RESPIRATORY NEGATIVE: 1

## 2018-08-21 NOTE — PROGRESS NOTES
[address] [address]  Dept: [phone]  Dept Fax: [phone]  Loc: [phone]  PROGRESS NOTE      Visit Date: 8/14/2018    No Spear is a 48 y.o. female who presents today for:  Chief Complaint   Patient presents with    Rash     red rash on right upper thigh/groin that itches and burns. Sx for 1 wk and has used a drawing tien     Medication Refill         Subjective:  Hx failed neck/ back syndrome. Refills. Worsening depression. reports daily headache. C/o rash to groin/ thighs past week. otc cream withourt relief. Review of Systems   Constitutional: Positive for fatigue. HENT: Negative. Eyes: Negative. Respiratory: Negative. Cardiovascular: Negative. Gastrointestinal: Negative. Endocrine: Negative. Genitourinary: Negative. Musculoskeletal: Positive for back pain, neck pain and neck stiffness. Skin: Negative. Neurological: Positive for headaches. Hematological: Negative. Psychiatric/Behavioral: Positive for decreased concentration and dysphoric mood. Negative for hallucinations, self-injury and suicidal ideas.      Past Medical History:   Diagnosis Date    Asthma     Degenerative disc disease     Hypertension     Hypothyroidism     Interstitial cystitis     Iritis 2013    OU      Osteopenia       Past Surgical History:   Procedure Laterality Date    CERVICAL FUSION  11/09    c 4,5,6    FULGURATION MINOR BLADDER LESION (W OR W/O BIOPSY)  2011    HYSTERECTOMY  4/02     Family History   Problem Relation Age of Onset    Rheum Arthritis Mother     Thyroid Disease Mother     High Blood Pressure Father     Diabetes Father     High Blood Pressure Sister     Thyroid Disease Brother     High Blood Pressure Brother      Social History   Substance Use Topics    Smoking status: Former Smoker     Types: Cigarettes    Smokeless tobacco: Never Used

## 2018-08-30 ENCOUNTER — TELEPHONE (OUTPATIENT)
Dept: FAMILY MEDICINE CLINIC | Age: 53
End: 2018-08-30

## 2018-08-30 DIAGNOSIS — Z12.11 SCREENING FOR COLON CANCER: Primary | ICD-10-CM

## 2018-08-30 NOTE — TELEPHONE ENCOUNTER
Referral is placed for Dr Marty Ahumada. Letter for handicap placard is written. Script is hand written for soft collar neck brace, unable to find in Epic.

## 2018-08-30 NOTE — TELEPHONE ENCOUNTER
1. Would like a referral for colonoscopy, would like Dr Lena Thao. 2. Also, can she have a neck brace? 3. Can she have a handicap placard?

## 2018-09-05 ENCOUNTER — TELEPHONE (OUTPATIENT)
Dept: FAMILY MEDICINE CLINIC | Age: 53
End: 2018-09-05

## 2018-09-05 RX ORDER — CLOBETASOL PROPIONATE 0.5 MG/G
CREAM TOPICAL DAILY
Qty: 60 G | Refills: 1 | Status: SHIPPED | OUTPATIENT
Start: 2018-09-05 | End: 2019-11-27

## 2018-09-05 NOTE — TELEPHONE ENCOUNTER
Has a rash on right thigh x 4 months that comes out when she is stressed, itches and burns. Has been treated twice with no relief. Any other cream besides hydrocortisone 2.5% she can try?      -E  JADAP

## 2018-09-07 ENCOUNTER — TELEPHONE (OUTPATIENT)
Dept: FAMILY MEDICINE CLINIC | Age: 53
End: 2018-09-07

## 2018-09-17 ENCOUNTER — TELEPHONE (OUTPATIENT)
Dept: FAMILY MEDICINE CLINIC | Age: 53
End: 2018-09-17

## 2018-10-03 RX ORDER — BUTALBITAL, ACETAMINOPHEN AND CAFFEINE 50; 325; 40 MG/1; MG/1; MG/1
TABLET ORAL
Qty: 180 TABLET | Refills: 1 | Status: SHIPPED | OUTPATIENT
Start: 2018-10-03 | End: 2019-01-31 | Stop reason: SDUPTHER

## 2018-10-24 DIAGNOSIS — F33.41 RECURRENT MAJOR DEPRESSIVE DISORDER, IN PARTIAL REMISSION (HCC): ICD-10-CM

## 2018-10-24 DIAGNOSIS — M96.1 FAILED NECK SYNDROME: ICD-10-CM

## 2018-10-24 RX ORDER — DIAZEPAM 5 MG/1
5 TABLET ORAL EVERY 8 HOURS PRN
Qty: 90 TABLET | Refills: 0 | Status: SHIPPED | OUTPATIENT
Start: 2018-10-24 | End: 2018-12-28 | Stop reason: SDUPTHER

## 2018-11-14 ENCOUNTER — OFFICE VISIT (OUTPATIENT)
Dept: PSYCHIATRY | Age: 53
End: 2018-11-14
Payer: COMMERCIAL

## 2018-11-14 DIAGNOSIS — F31.81 BIPOLAR II DISORDER (HCC): Primary | ICD-10-CM

## 2018-11-14 PROCEDURE — 99213 OFFICE O/P EST LOW 20 MIN: CPT | Performed by: NURSE PRACTITIONER

## 2018-11-14 RX ORDER — BUPROPION HYDROCHLORIDE 300 MG/1
300 TABLET ORAL EVERY MORNING
Qty: 30 TABLET | Refills: 2 | Status: SHIPPED | OUTPATIENT
Start: 2018-11-14 | End: 2019-02-27 | Stop reason: SDUPTHER

## 2018-11-14 RX ORDER — LAMOTRIGINE 200 MG/1
200 TABLET ORAL DAILY
Qty: 30 TABLET | Refills: 2 | Status: SHIPPED | OUTPATIENT
Start: 2018-11-14 | End: 2019-02-27 | Stop reason: SDUPTHER

## 2018-12-26 ENCOUNTER — TELEPHONE (OUTPATIENT)
Dept: FAMILY MEDICINE CLINIC | Age: 53
End: 2018-12-26

## 2018-12-26 DIAGNOSIS — M96.1 FAILED NECK SYNDROME: ICD-10-CM

## 2018-12-26 DIAGNOSIS — F33.41 RECURRENT MAJOR DEPRESSIVE DISORDER, IN PARTIAL REMISSION (HCC): ICD-10-CM

## 2018-12-26 RX ORDER — DIAZEPAM 5 MG/1
5 TABLET ORAL EVERY 8 HOURS PRN
Qty: 90 TABLET | Refills: 0 | OUTPATIENT
Start: 2018-12-26 | End: 2019-12-26

## 2018-12-26 NOTE — TELEPHONE ENCOUNTER
Spoke with patient. She is unable to come in for appt until Friday. She has been taking Michaela [place] Plus and Sudafed w/o much relief. I advised her to try Mucinex otc. F/u on Friday, sooner if sx worsen.

## 2018-12-26 NOTE — TELEPHONE ENCOUNTER
Patient is having a lot of sinus drainage and right ear pain. Symptoms about one week. OTC not helping.  Patient is requesting something called into [facility]

## 2018-12-28 ENCOUNTER — OFFICE VISIT (OUTPATIENT)
Dept: FAMILY MEDICINE CLINIC | Age: 53
End: 2018-12-28
Payer: COMMERCIAL

## 2018-12-28 VITALS
WEIGHT: 126.6 LBS | SYSTOLIC BLOOD PRESSURE: 134 MMHG | DIASTOLIC BLOOD PRESSURE: 88 MMHG | RESPIRATION RATE: 16 BRPM | HEART RATE: 68 BPM | BODY MASS INDEX: 24.85 KG/M2 | HEIGHT: 60 IN | OXYGEN SATURATION: 99 % | TEMPERATURE: 98.1 F

## 2018-12-28 DIAGNOSIS — M96.1 FAILED NECK SYNDROME: ICD-10-CM

## 2018-12-28 DIAGNOSIS — H65.23 BILATERAL CHRONIC SEROUS OTITIS MEDIA: Primary | ICD-10-CM

## 2018-12-28 DIAGNOSIS — J40 BRONCHITIS: ICD-10-CM

## 2018-12-28 DIAGNOSIS — Z12.39 SCREENING FOR BREAST CANCER: ICD-10-CM

## 2018-12-28 DIAGNOSIS — F33.41 RECURRENT MAJOR DEPRESSIVE DISORDER, IN PARTIAL REMISSION (HCC): ICD-10-CM

## 2018-12-28 DIAGNOSIS — J01.90 ACUTE SINUSITIS, RECURRENCE NOT SPECIFIED, UNSPECIFIED LOCATION: ICD-10-CM

## 2018-12-28 PROCEDURE — 99214 OFFICE O/P EST MOD 30 MIN: CPT | Performed by: NURSE PRACTITIONER

## 2018-12-28 RX ORDER — CIPROFLOXACIN 500 MG/1
500 TABLET, FILM COATED ORAL 2 TIMES DAILY
Qty: 20 TABLET | Refills: 0 | Status: SHIPPED | OUTPATIENT
Start: 2018-12-28 | End: 2019-11-12 | Stop reason: SDUPTHER

## 2018-12-28 RX ORDER — BENZONATATE 200 MG/1
200 CAPSULE ORAL 3 TIMES DAILY PRN
Qty: 30 CAPSULE | Refills: 0 | Status: SHIPPED | OUTPATIENT
Start: 2018-12-28 | End: 2019-01-04

## 2018-12-28 RX ORDER — PREDNISONE 1 MG/1
TABLET ORAL
Qty: 30 TABLET | Refills: 0 | Status: SHIPPED | OUTPATIENT
Start: 2018-12-28 | End: 2019-11-12 | Stop reason: SDUPTHER

## 2018-12-28 RX ORDER — DIAZEPAM 5 MG/1
5 TABLET ORAL EVERY 8 HOURS PRN
Qty: 90 TABLET | Refills: 0 | Status: SHIPPED | OUTPATIENT
Start: 2018-12-28 | End: 2019-04-02 | Stop reason: SDUPTHER

## 2018-12-28 RX ORDER — CETIRIZINE HYDROCHLORIDE 10 MG/1
10 TABLET ORAL DAILY
Qty: 30 TABLET | Refills: 0 | Status: SHIPPED | OUTPATIENT
Start: 2018-12-28 | End: 2019-01-27

## 2019-01-07 ASSESSMENT — ENCOUNTER SYMPTOMS
BACK PAIN: 1
GASTROINTESTINAL NEGATIVE: 1
EYES NEGATIVE: 1
CHEST TIGHTNESS: 1
COUGH: 1

## 2019-01-25 ENCOUNTER — TELEPHONE (OUTPATIENT)
Dept: FAMILY MEDICINE CLINIC | Age: 54
End: 2019-01-25

## 2019-01-25 RX ORDER — ACYCLOVIR 800 MG/1
800 TABLET ORAL
Qty: 35 TABLET | Refills: 1 | Status: SHIPPED | OUTPATIENT
Start: 2019-01-25 | End: 2019-02-01

## 2019-02-01 RX ORDER — BUTALBITAL, ACETAMINOPHEN AND CAFFEINE 50; 325; 40 MG/1; MG/1; MG/1
TABLET ORAL
Qty: 180 TABLET | Refills: 1 | Status: SHIPPED | OUTPATIENT
Start: 2019-02-01 | End: 2019-07-09

## 2019-02-05 ENCOUNTER — TELEPHONE (OUTPATIENT)
Dept: FAMILY MEDICINE CLINIC | Age: 54
End: 2019-02-05

## 2019-02-11 DIAGNOSIS — E03.9 HYPOTHYROIDISM, UNSPECIFIED TYPE: Primary | Chronic | ICD-10-CM

## 2019-02-11 RX ORDER — LEVOTHYROXINE SODIUM 112 UG/1
TABLET ORAL
Qty: 90 TABLET | Refills: 0 | Status: SHIPPED | OUTPATIENT
Start: 2019-02-11 | End: 2019-03-12 | Stop reason: DRUGHIGH

## 2019-02-27 ENCOUNTER — OFFICE VISIT (OUTPATIENT)
Dept: PSYCHIATRY | Age: 54
End: 2019-02-27
Payer: COMMERCIAL

## 2019-02-27 DIAGNOSIS — F31.81 BIPOLAR II DISORDER (HCC): Primary | ICD-10-CM

## 2019-02-27 PROCEDURE — 99213 OFFICE O/P EST LOW 20 MIN: CPT | Performed by: NURSE PRACTITIONER

## 2019-02-27 RX ORDER — BUPROPION HYDROCHLORIDE 300 MG/1
300 TABLET ORAL EVERY MORNING
Qty: 30 TABLET | Refills: 2 | Status: SHIPPED | OUTPATIENT
Start: 2019-02-27 | End: 2019-05-29 | Stop reason: SDUPTHER

## 2019-02-27 RX ORDER — LAMOTRIGINE 200 MG/1
200 TABLET ORAL DAILY
Qty: 30 TABLET | Refills: 2 | Status: SHIPPED | OUTPATIENT
Start: 2019-02-27 | End: 2019-05-29 | Stop reason: SDUPTHER

## 2019-03-04 ENCOUNTER — OFFICE VISIT (OUTPATIENT)
Dept: FAMILY MEDICINE CLINIC | Age: 54
End: 2019-03-04
Payer: COMMERCIAL

## 2019-03-04 VITALS
RESPIRATION RATE: 16 BRPM | TEMPERATURE: 97.9 F | SYSTOLIC BLOOD PRESSURE: 128 MMHG | BODY MASS INDEX: 23.22 KG/M2 | DIASTOLIC BLOOD PRESSURE: 88 MMHG | HEIGHT: 61 IN | WEIGHT: 123 LBS | HEART RATE: 72 BPM

## 2019-03-04 DIAGNOSIS — R31.21 ASYMPTOMATIC MICROSCOPIC HEMATURIA: ICD-10-CM

## 2019-03-04 DIAGNOSIS — F31.70 BIPOLAR DISORDER IN PARTIAL REMISSION, MOST RECENT EPISODE UNSPECIFIED TYPE (HCC): ICD-10-CM

## 2019-03-04 DIAGNOSIS — R31.9 HEMATURIA, UNSPECIFIED TYPE: Primary | ICD-10-CM

## 2019-03-04 DIAGNOSIS — E03.9 HYPOTHYROIDISM, UNSPECIFIED TYPE: ICD-10-CM

## 2019-03-04 DIAGNOSIS — M96.1 FAILED NECK SYNDROME: ICD-10-CM

## 2019-03-04 DIAGNOSIS — F33.1 MODERATE EPISODE OF RECURRENT MAJOR DEPRESSIVE DISORDER (HCC): ICD-10-CM

## 2019-03-04 DIAGNOSIS — M96.1 FAILED BACK SYNDROME: ICD-10-CM

## 2019-03-04 LAB
BILIRUBIN, POC: NEGATIVE
BLOOD URINE, POC: ABNORMAL
CLARITY, POC: CLEAR
COLOR, POC: YELLOW
GLUCOSE URINE, POC: NEGATIVE
KETONES, POC: NEGATIVE
LEUKOCYTE EST, POC: ABNORMAL
NITRITE, POC: NEGATIVE
PH, POC: 7
PROTEIN, POC: NEGATIVE
SPECIFIC GRAVITY, POC: 1.01
UROBILINOGEN, POC: 0.2

## 2019-03-04 PROCEDURE — 99214 OFFICE O/P EST MOD 30 MIN: CPT | Performed by: NURSE PRACTITIONER

## 2019-03-04 PROCEDURE — 81003 URINALYSIS AUTO W/O SCOPE: CPT | Performed by: NURSE PRACTITIONER

## 2019-03-04 RX ORDER — CIPROFLOXACIN 500 MG/1
500 TABLET, FILM COATED ORAL 2 TIMES DAILY
Qty: 20 TABLET | Refills: 0 | Status: SHIPPED | OUTPATIENT
Start: 2019-03-04 | End: 2019-03-05 | Stop reason: SDUPTHER

## 2019-03-04 RX ORDER — CYCLOBENZAPRINE HCL 10 MG
10 TABLET ORAL NIGHTLY PRN
Qty: 30 TABLET | Refills: 1 | Status: SHIPPED | OUTPATIENT
Start: 2019-03-04 | End: 2019-03-05 | Stop reason: SDUPTHER

## 2019-03-04 ASSESSMENT — ENCOUNTER SYMPTOMS: BACK PAIN: 1

## 2019-03-05 RX ORDER — CYCLOBENZAPRINE HCL 10 MG
10 TABLET ORAL NIGHTLY PRN
Qty: 30 TABLET | Refills: 1 | Status: SHIPPED | OUTPATIENT
Start: 2019-03-05 | End: 2020-12-03 | Stop reason: SDUPTHER

## 2019-03-05 RX ORDER — CIPROFLOXACIN 500 MG/1
500 TABLET, FILM COATED ORAL 2 TIMES DAILY
Qty: 20 TABLET | Refills: 0 | Status: SHIPPED | OUTPATIENT
Start: 2019-03-05 | End: 2019-03-15

## 2019-03-06 LAB
ORGANISM: ABNORMAL
URINE CULTURE, ROUTINE: ABNORMAL

## 2019-03-07 ENCOUNTER — TELEPHONE (OUTPATIENT)
Dept: FAMILY MEDICINE CLINIC | Age: 54
End: 2019-03-07

## 2019-03-08 ENCOUNTER — NURSE ONLY (OUTPATIENT)
Dept: FAMILY MEDICINE CLINIC | Age: 54
End: 2019-03-08
Payer: COMMERCIAL

## 2019-03-08 DIAGNOSIS — R31.21 ASYMPTOMATIC MICROSCOPIC HEMATURIA: ICD-10-CM

## 2019-03-08 DIAGNOSIS — R31.9 HEMATURIA, UNSPECIFIED TYPE: ICD-10-CM

## 2019-03-08 DIAGNOSIS — E03.9 HYPOTHYROIDISM, UNSPECIFIED TYPE: ICD-10-CM

## 2019-03-08 LAB
ALBUMIN SERPL-MCNC: 5.3 G/DL (ref 3.5–5.1)
ALP BLD-CCNC: 69 U/L (ref 38–126)
ALT SERPL-CCNC: 20 U/L (ref 11–66)
ANION GAP SERPL CALCULATED.3IONS-SCNC: 15 MEQ/L (ref 8–16)
AST SERPL-CCNC: 22 U/L (ref 5–40)
BASOPHILS # BLD: 0.8 %
BASOPHILS ABSOLUTE: 0 THOU/MM3 (ref 0–0.1)
BILIRUB SERPL-MCNC: 0.3 MG/DL (ref 0.3–1.2)
BUN BLDV-MCNC: 15 MG/DL (ref 7–22)
CALCIUM SERPL-MCNC: 9.6 MG/DL (ref 8.5–10.5)
CHLORIDE BLD-SCNC: 107 MEQ/L (ref 98–111)
CO2: 23 MEQ/L (ref 23–33)
CREAT SERPL-MCNC: 0.7 MG/DL (ref 0.4–1.2)
EOSINOPHIL # BLD: 5.1 %
EOSINOPHILS ABSOLUTE: 0.2 THOU/MM3 (ref 0–0.4)
ERYTHROCYTE [DISTWIDTH] IN BLOOD BY AUTOMATED COUNT: 13.2 % (ref 11.5–14.5)
ERYTHROCYTE [DISTWIDTH] IN BLOOD BY AUTOMATED COUNT: 43.9 FL (ref 35–45)
GFR SERPL CREATININE-BSD FRML MDRD: 88 ML/MIN/1.73M2
GLUCOSE BLD-MCNC: 103 MG/DL (ref 70–108)
HCT VFR BLD CALC: 39.9 % (ref 37–47)
HEMOGLOBIN: 12.9 GM/DL (ref 12–16)
IMMATURE GRANS (ABS): 0.01 THOU/MM3 (ref 0–0.07)
IMMATURE GRANULOCYTES: 0.2 %
LYMPHOCYTES # BLD: 28.1 %
LYMPHOCYTES ABSOLUTE: 1.3 THOU/MM3 (ref 1–4.8)
MCH RBC QN AUTO: 29.6 PG (ref 26–33)
MCHC RBC AUTO-ENTMCNC: 32.3 GM/DL (ref 32.2–35.5)
MCV RBC AUTO: 91.5 FL (ref 81–99)
MONOCYTES # BLD: 9.3 %
MONOCYTES ABSOLUTE: 0.4 THOU/MM3 (ref 0.4–1.3)
NUCLEATED RED BLOOD CELLS: 0 /100 WBC
PLATELET # BLD: 294 THOU/MM3 (ref 130–400)
PMV BLD AUTO: 10.8 FL (ref 9.4–12.4)
POTASSIUM SERPL-SCNC: 4 MEQ/L (ref 3.5–5.2)
RBC # BLD: 4.36 MILL/MM3 (ref 4.2–5.4)
SEG NEUTROPHILS: 56.5 %
SEGMENTED NEUTROPHILS ABSOLUTE COUNT: 2.7 THOU/MM3 (ref 1.8–7.7)
SODIUM BLD-SCNC: 145 MEQ/L (ref 135–145)
T4 FREE: 1.84 NG/DL (ref 0.93–1.76)
TOTAL PROTEIN: 7.2 G/DL (ref 6.1–8)
TSH SERPL DL<=0.05 MIU/L-ACNC: 0.16 UIU/ML (ref 0.4–4.2)
WBC # BLD: 4.7 THOU/MM3 (ref 4.8–10.8)

## 2019-03-08 PROCEDURE — 36415 COLL VENOUS BLD VENIPUNCTURE: CPT | Performed by: NURSE PRACTITIONER

## 2019-03-12 ENCOUNTER — TELEPHONE (OUTPATIENT)
Dept: FAMILY MEDICINE CLINIC | Age: 54
End: 2019-03-12

## 2019-03-12 DIAGNOSIS — E03.9 ACQUIRED HYPOTHYROIDISM: Primary | Chronic | ICD-10-CM

## 2019-03-12 RX ORDER — LEVOTHYROXINE SODIUM 0.1 MG/1
100 TABLET ORAL DAILY
Qty: 90 TABLET | Refills: 0 | Status: SHIPPED | OUTPATIENT
Start: 2019-03-12 | End: 2019-06-06 | Stop reason: DRUGHIGH

## 2019-03-21 ENCOUNTER — HOSPITAL ENCOUNTER (OUTPATIENT)
Dept: CT IMAGING | Age: 54
Discharge: HOME | End: 2019-03-21
Payer: COMMERCIAL

## 2019-03-21 DIAGNOSIS — R31.9 HEMATURIA, UNSPECIFIED TYPE: ICD-10-CM

## 2019-03-21 DIAGNOSIS — R31.21 ASYMPTOMATIC MICROSCOPIC HEMATURIA: ICD-10-CM

## 2019-03-21 PROCEDURE — 74178 CT ABD&PLV WO CNTR FLWD CNTR: CPT

## 2019-03-21 PROCEDURE — 6360000004 HC RX CONTRAST MEDICATION: Performed by: NURSE PRACTITIONER

## 2019-03-21 RX ADMIN — IOPAMIDOL 85 ML: 755 INJECTION, SOLUTION INTRAVENOUS at 12:05

## 2019-03-26 ENCOUNTER — OFFICE VISIT (OUTPATIENT)
Dept: UROLOGY | Age: 54
End: 2019-03-26
Payer: COMMERCIAL

## 2019-03-26 VITALS
DIASTOLIC BLOOD PRESSURE: 86 MMHG | HEIGHT: 61 IN | SYSTOLIC BLOOD PRESSURE: 132 MMHG | BODY MASS INDEX: 23.6 KG/M2 | WEIGHT: 125 LBS

## 2019-03-26 DIAGNOSIS — R31.9 HEMATURIA, UNSPECIFIED TYPE: Primary | ICD-10-CM

## 2019-03-26 DIAGNOSIS — N39.41 URGE INCONTINENCE: ICD-10-CM

## 2019-03-26 LAB
BILIRUBIN URINE: NEGATIVE
BLOOD URINE, POC: ABNORMAL
CHARACTER, URINE: CLEAR
COLOR, URINE: YELLOW
GLUCOSE URINE: NEGATIVE MG/DL
KETONES, URINE: NEGATIVE
LEUKOCYTE CLUMPS, URINE: NEGATIVE
NITRITE, URINE: NEGATIVE
PH, URINE: 7 (ref 5–9)
POST VOID RESIDUAL (PVR): 9 ML
PROTEIN, URINE: NEGATIVE MG/DL
SPECIFIC GRAVITY, URINE: 1.01 (ref 1–1.03)
UROBILINOGEN, URINE: 0.2 EU/DL (ref 0–1)

## 2019-03-26 PROCEDURE — 51798 US URINE CAPACITY MEASURE: CPT | Performed by: UROLOGY

## 2019-03-26 PROCEDURE — 81003 URINALYSIS AUTO W/O SCOPE: CPT | Performed by: UROLOGY

## 2019-03-26 PROCEDURE — 99202 OFFICE O/P NEW SF 15 MIN: CPT | Performed by: UROLOGY

## 2019-03-26 ASSESSMENT — ENCOUNTER SYMPTOMS
SHORTNESS OF BREATH: 1
FACIAL SWELLING: 0
EYE PAIN: 0
CHEST TIGHTNESS: 1
EYE REDNESS: 0
ABDOMINAL PAIN: 1
VOMITING: 0
COLOR CHANGE: 0

## 2019-03-27 ENCOUNTER — TELEPHONE (OUTPATIENT)
Dept: UROLOGY | Age: 54
End: 2019-03-27

## 2019-03-28 ENCOUNTER — TELEPHONE (OUTPATIENT)
Dept: UROLOGY | Age: 54
End: 2019-03-28

## 2019-03-28 NOTE — TELEPHONE ENCOUNTER
Prior Auth initiated for [employer] . PA sent to [facility]. Should receive response in 3-5 days.

## 2019-04-01 DIAGNOSIS — M96.1 FAILED NECK SYNDROME: ICD-10-CM

## 2019-04-01 DIAGNOSIS — F33.41 RECURRENT MAJOR DEPRESSIVE DISORDER, IN PARTIAL REMISSION (HCC): ICD-10-CM

## 2019-04-02 RX ORDER — DIAZEPAM 5 MG/1
5 TABLET ORAL EVERY 8 HOURS PRN
Qty: 90 TABLET | Refills: 0 | Status: SHIPPED | OUTPATIENT
Start: 2019-04-02 | End: 2019-07-09 | Stop reason: SDUPTHER

## 2019-04-04 RX ORDER — DARIFENACIN HYDROBROMIDE 7.5 MG/1
7.5 TABLET, EXTENDED RELEASE ORAL DAILY
Qty: 30 TABLET | Refills: 5 | Status: SHIPPED | OUTPATIENT
Start: 2019-04-04 | End: 2019-04-18

## 2019-04-04 NOTE — TELEPHONE ENCOUNTER
Per [employer] the Myrbetriq has been denied as patient must have tried and failed 2 meds from [facility] drug list. The required meds are Detrol, Ditropan, Enablex or Sanctura. Please send to [facility].

## 2019-04-16 ENCOUNTER — PROCEDURE VISIT (OUTPATIENT)
Dept: UROLOGY | Age: 54
End: 2019-04-16
Payer: COMMERCIAL

## 2019-04-16 ENCOUNTER — TELEPHONE (OUTPATIENT)
Dept: UROLOGY | Age: 54
End: 2019-04-16

## 2019-04-16 VITALS
WEIGHT: 124 LBS | SYSTOLIC BLOOD PRESSURE: 132 MMHG | DIASTOLIC BLOOD PRESSURE: 82 MMHG | BODY MASS INDEX: 24.35 KG/M2 | HEIGHT: 60 IN

## 2019-04-16 DIAGNOSIS — N39.41 URGE INCONTINENCE: ICD-10-CM

## 2019-04-16 DIAGNOSIS — Z01.818 PREOPERATIVE TESTING: ICD-10-CM

## 2019-04-16 DIAGNOSIS — R31.9 HEMATURIA, UNSPECIFIED TYPE: Primary | ICD-10-CM

## 2019-04-16 LAB
BILIRUBIN, POC: NORMAL
BLOOD URINE, POC: NORMAL
CLARITY, POC: CLEAR
COLOR, POC: YELLOW
GLUCOSE URINE, POC: NORMAL
KETONES, POC: NORMAL
LEUKOCYTE EST, POC: NORMAL
NITRITE, POC: NORMAL
PH, POC: 6
PROTEIN, POC: 30
SPECIFIC GRAVITY, POC: 1.02
UROBILINOGEN, POC: 0.2

## 2019-04-16 PROCEDURE — 99214 OFFICE O/P EST MOD 30 MIN: CPT | Performed by: UROLOGY

## 2019-04-16 PROCEDURE — 81003 URINALYSIS AUTO W/O SCOPE: CPT | Performed by: UROLOGY

## 2019-04-16 RX ORDER — LUBIPROSTONE 24 UG/1
24 CAPSULE, GELATIN COATED ORAL 2 TIMES DAILY WITH MEALS
Qty: 60 CAPSULE | Refills: 5 | Status: SHIPPED | OUTPATIENT
Start: 2019-04-16 | End: 2019-10-30

## 2019-04-16 NOTE — TELEPHONE ENCOUNTER
DO NOT TAKE ASPIRIN, MULTIVITAMINS, VITAMIN A, VITAMIN E, VITAMIN B, OR MOTRIN-LIKE DRUGS 7 DAYS PRIOR TO SURGERY AND 3 DAYS FOLLOWING     Alysa Conklin 1965 Diagnosis: Hematuria     Surgical Physician: Dr. Elva Arthur have been scheduled for the procedure marked below:      Surgery: Cystoscopy with hydrodistention and instillation of DMSO and marcaine           Date: 04-     Anesthesia: Anesthesiologist (General/Spinal)     Place of Service: [facility]         Please be at the Outpatient Department Second Floor Same Day Surgery by:6:00am        INSTRUCTIONS AS MARKED BELOW:    1. DO NOT eat or drink anything after midnight before surgery. 2. We prefer you shower or bathe with an antibacterial soap (Dial) the morning of surgery. 3.  Please ensure to have a  with you to transport you home. 4.  Please bring a current medication list, photo ID and insurance card(s) with you  5. Okay to take Tylenol  6. If you take Glucophage or Metformin, hold 48-hours prior to surgery  7. Take blood pressure medication as directed, if taken in the morning take with a small sip of water  8. PLEASE BRING THIS LETTER WITH YOU AND SHOW IT TO THE  AT [facility]. 9.  Please send a copy to the Family Dr: Jenny Freeman, APRN - CNP  10.   Your follow up appointment is scheduled for   [id number]   At   3:45pm   With  Dr. Brenda Quiroga    Date: 4/16/2019

## 2019-04-18 NOTE — PROGRESS NOTES
NPO after midnight  [employer] and drivers license  Wear comfortable clean clothing  Do not bring jewelry   Shower night before and morning of surgery with a liquid antibacterial soap  Bring medications in original bottles  Follow all instructions give by your physician   needed at discharge  Call [phone] for any questions

## 2019-04-22 ENCOUNTER — TELEPHONE (OUTPATIENT)
Dept: UROLOGY | Age: 54
End: 2019-04-22

## 2019-04-22 ENCOUNTER — HOSPITAL ENCOUNTER (OUTPATIENT)
Dept: GENERAL RADIOLOGY | Age: 54
Discharge: HOME | End: 2019-04-22
Payer: COMMERCIAL

## 2019-04-22 ENCOUNTER — HOSPITAL ENCOUNTER (OUTPATIENT)
Age: 54
Discharge: HOME | End: 2019-04-22
Payer: COMMERCIAL

## 2019-04-22 DIAGNOSIS — Z01.818 PREOPERATIVE TESTING: ICD-10-CM

## 2019-04-22 DIAGNOSIS — R31.9 HEMATURIA, UNSPECIFIED TYPE: ICD-10-CM

## 2019-04-22 DIAGNOSIS — N39.41 URGE INCONTINENCE: ICD-10-CM

## 2019-04-22 LAB
EKG ATRIAL RATE: 66 BPM
EKG P AXIS: 80 DEGREES
EKG P-R INTERVAL: 154 MS
EKG Q-T INTERVAL: 424 MS
EKG QRS DURATION: 94 MS
EKG QTC CALCULATION (BAZETT): 444 MS
EKG R AXIS: 49 DEGREES
EKG T AXIS: 47 DEGREES
EKG VENTRICULAR RATE: 66 BPM

## 2019-04-22 PROCEDURE — 93005 ELECTROCARDIOGRAM TRACING: CPT

## 2019-04-22 PROCEDURE — 71046 X-RAY EXAM CHEST 2 VIEWS: CPT

## 2019-04-22 NOTE — TELEPHONE ENCOUNTER
Patient scheduled for surgery on 04- and has an allergy to Ancef. What antibiotic would you like to use in its place?

## 2019-04-25 ENCOUNTER — ANESTHESIA (OUTPATIENT)
Dept: OPERATING ROOM | Age: 54
End: 2019-04-25
Payer: COMMERCIAL

## 2019-04-25 ENCOUNTER — ANESTHESIA EVENT (OUTPATIENT)
Dept: OPERATING ROOM | Age: 54
End: 2019-04-25
Payer: COMMERCIAL

## 2019-04-25 ENCOUNTER — HOSPITAL ENCOUNTER (OUTPATIENT)
Age: 54
Setting detail: OUTPATIENT SURGERY
Discharge: HOME | End: 2019-04-25
Attending: UROLOGY | Admitting: UROLOGY
Payer: COMMERCIAL

## 2019-04-25 VITALS
TEMPERATURE: 97.5 F | WEIGHT: 125 LBS | BODY MASS INDEX: 24.54 KG/M2 | SYSTOLIC BLOOD PRESSURE: 128 MMHG | HEIGHT: 60 IN | RESPIRATION RATE: 18 BRPM | HEART RATE: 71 BPM | OXYGEN SATURATION: 99 % | DIASTOLIC BLOOD PRESSURE: 61 MMHG

## 2019-04-25 VITALS
DIASTOLIC BLOOD PRESSURE: 53 MMHG | SYSTOLIC BLOOD PRESSURE: 112 MMHG | OXYGEN SATURATION: 99 % | RESPIRATION RATE: 6 BRPM

## 2019-04-25 PROCEDURE — 7100000010 HC PHASE II RECOVERY - FIRST 15 MIN: Performed by: UROLOGY

## 2019-04-25 PROCEDURE — 7100000000 HC PACU RECOVERY - FIRST 15 MIN: Performed by: UROLOGY

## 2019-04-25 PROCEDURE — 6370000000 HC RX 637 (ALT 250 FOR IP)

## 2019-04-25 PROCEDURE — 7100000011 HC PHASE II RECOVERY - ADDTL 15 MIN: Performed by: UROLOGY

## 2019-04-25 PROCEDURE — 2500000003 HC RX 250 WO HCPCS: Performed by: UROLOGY

## 2019-04-25 PROCEDURE — 2580000003 HC RX 258

## 2019-04-25 PROCEDURE — 7100000001 HC PACU RECOVERY - ADDTL 15 MIN: Performed by: UROLOGY

## 2019-04-25 PROCEDURE — 3700000001 HC ADD 15 MINUTES (ANESTHESIA): Performed by: UROLOGY

## 2019-04-25 PROCEDURE — 6360000002 HC RX W HCPCS: Performed by: NURSE ANESTHETIST, CERTIFIED REGISTERED

## 2019-04-25 PROCEDURE — 3600000013 HC SURGERY LEVEL 3 ADDTL 15MIN: Performed by: UROLOGY

## 2019-04-25 PROCEDURE — 6360000002 HC RX W HCPCS

## 2019-04-25 PROCEDURE — 3600000003 HC SURGERY LEVEL 3 BASE: Performed by: UROLOGY

## 2019-04-25 PROCEDURE — 2709999900 HC NON-CHARGEABLE SUPPLY: Performed by: UROLOGY

## 2019-04-25 PROCEDURE — 3700000000 HC ANESTHESIA ATTENDED CARE: Performed by: UROLOGY

## 2019-04-25 RX ORDER — HYDRALAZINE HYDROCHLORIDE 20 MG/ML
5 INJECTION INTRAMUSCULAR; INTRAVENOUS EVERY 10 MIN PRN
Status: DISCONTINUED | OUTPATIENT
Start: 2019-04-25 | End: 2019-04-25 | Stop reason: HOSPADM

## 2019-04-25 RX ORDER — MEPERIDINE HYDROCHLORIDE 25 MG/ML
12.5 INJECTION INTRAMUSCULAR; INTRAVENOUS; SUBCUTANEOUS EVERY 5 MIN PRN
Status: DISCONTINUED | OUTPATIENT
Start: 2019-04-25 | End: 2019-04-25 | Stop reason: HOSPADM

## 2019-04-25 RX ORDER — ACETAMINOPHEN 325 MG/1
650 TABLET ORAL EVERY 4 HOURS PRN
Status: DISCONTINUED | OUTPATIENT
Start: 2019-04-25 | End: 2019-04-25 | Stop reason: HOSPADM

## 2019-04-25 RX ORDER — CIPROFLOXACIN 500 MG/1
500 TABLET, FILM COATED ORAL 2 TIMES DAILY
Qty: 2 TABLET | Refills: 0 | Status: SHIPPED | OUTPATIENT
Start: 2019-04-25 | End: 2019-04-26

## 2019-04-25 RX ORDER — FENTANYL CITRATE 50 UG/ML
50 INJECTION, SOLUTION INTRAMUSCULAR; INTRAVENOUS EVERY 5 MIN PRN
Status: DISCONTINUED | OUTPATIENT
Start: 2019-04-25 | End: 2019-04-25 | Stop reason: HOSPADM

## 2019-04-25 RX ORDER — LABETALOL HYDROCHLORIDE 5 MG/ML
5 INJECTION, SOLUTION INTRAVENOUS EVERY 5 MIN PRN
Status: DISCONTINUED | OUTPATIENT
Start: 2019-04-25 | End: 2019-04-25 | Stop reason: HOSPADM

## 2019-04-25 RX ORDER — BUPIVACAINE HYDROCHLORIDE 5 MG/ML
INJECTION, SOLUTION PERINEURAL PRN
Status: DISCONTINUED | OUTPATIENT
Start: 2019-04-25 | End: 2019-04-25 | Stop reason: ALTCHOICE

## 2019-04-25 RX ORDER — ACETAMINOPHEN 325 MG/1
TABLET ORAL
Status: COMPLETED
Start: 2019-04-25 | End: 2019-04-25

## 2019-04-25 RX ORDER — LIDOCAINE HYDROCHLORIDE 20 MG/ML
INJECTION, SOLUTION INTRAVENOUS PRN
Status: DISCONTINUED | OUTPATIENT
Start: 2019-04-25 | End: 2019-04-25 | Stop reason: SDUPTHER

## 2019-04-25 RX ORDER — CIPROFLOXACIN 2 MG/ML
400 INJECTION, SOLUTION INTRAVENOUS
Status: COMPLETED | OUTPATIENT
Start: 2019-04-25 | End: 2019-04-25

## 2019-04-25 RX ORDER — DEXAMETHASONE SODIUM PHOSPHATE 4 MG/ML
INJECTION, SOLUTION INTRA-ARTICULAR; INTRALESIONAL; INTRAMUSCULAR; INTRAVENOUS; SOFT TISSUE PRN
Status: DISCONTINUED | OUTPATIENT
Start: 2019-04-25 | End: 2019-04-25 | Stop reason: SDUPTHER

## 2019-04-25 RX ORDER — DIPHENHYDRAMINE HYDROCHLORIDE 50 MG/ML
12.5 INJECTION INTRAMUSCULAR; INTRAVENOUS
Status: DISCONTINUED | OUTPATIENT
Start: 2019-04-25 | End: 2019-04-25 | Stop reason: HOSPADM

## 2019-04-25 RX ORDER — AMITRIPTYLINE HYDROCHLORIDE 10 MG/1
10 TABLET, FILM COATED ORAL NIGHTLY
Qty: 30 TABLET | Refills: 5 | Status: SHIPPED | OUTPATIENT
Start: 2019-04-25 | End: 2020-12-03

## 2019-04-25 RX ORDER — FENTANYL CITRATE 50 UG/ML
INJECTION, SOLUTION INTRAMUSCULAR; INTRAVENOUS PRN
Status: DISCONTINUED | OUTPATIENT
Start: 2019-04-25 | End: 2019-04-25 | Stop reason: SDUPTHER

## 2019-04-25 RX ORDER — SODIUM CHLORIDE 9 MG/ML
INJECTION, SOLUTION INTRAVENOUS CONTINUOUS
Status: DISCONTINUED | OUTPATIENT
Start: 2019-04-25 | End: 2019-04-25 | Stop reason: HOSPADM

## 2019-04-25 RX ORDER — ONDANSETRON 2 MG/ML
4 INJECTION INTRAMUSCULAR; INTRAVENOUS
Status: DISCONTINUED | OUTPATIENT
Start: 2019-04-25 | End: 2019-04-25 | Stop reason: HOSPADM

## 2019-04-25 RX ORDER — PROPOFOL 10 MG/ML
INJECTION, EMULSION INTRAVENOUS PRN
Status: DISCONTINUED | OUTPATIENT
Start: 2019-04-25 | End: 2019-04-25 | Stop reason: SDUPTHER

## 2019-04-25 RX ORDER — ONDANSETRON 2 MG/ML
INJECTION INTRAMUSCULAR; INTRAVENOUS PRN
Status: DISCONTINUED | OUTPATIENT
Start: 2019-04-25 | End: 2019-04-25 | Stop reason: SDUPTHER

## 2019-04-25 RX ADMIN — ACETAMINOPHEN 650 MG: 325 TABLET ORAL at 09:36

## 2019-04-25 RX ADMIN — FENTANYL CITRATE 25 MCG: 50 INJECTION INTRAMUSCULAR; INTRAVENOUS at 07:50

## 2019-04-25 RX ADMIN — DEXAMETHASONE SODIUM PHOSPHATE 8 MG: 4 INJECTION, SOLUTION INTRAMUSCULAR; INTRAVENOUS at 07:55

## 2019-04-25 RX ADMIN — ONDANSETRON HYDROCHLORIDE 4 MG: 4 INJECTION, SOLUTION INTRAMUSCULAR; INTRAVENOUS at 07:55

## 2019-04-25 RX ADMIN — CIPROFLOXACIN 400 MG: 2 INJECTION, SOLUTION INTRAVENOUS at 07:50

## 2019-04-25 RX ADMIN — FENTANYL CITRATE 75 MCG: 50 INJECTION INTRAMUSCULAR; INTRAVENOUS at 08:05

## 2019-04-25 RX ADMIN — SODIUM CHLORIDE: 9 INJECTION, SOLUTION INTRAVENOUS at 07:01

## 2019-04-25 RX ADMIN — LIDOCAINE HYDROCHLORIDE 60 MG: 20 INJECTION, SOLUTION INTRAVENOUS at 07:44

## 2019-04-25 RX ADMIN — PROPOFOL 200 MG: 10 INJECTION, EMULSION INTRAVENOUS at 07:44

## 2019-04-25 ASSESSMENT — PULMONARY FUNCTION TESTS
PIF_VALUE: 3
PIF_VALUE: 2
PIF_VALUE: 1
PIF_VALUE: 2
PIF_VALUE: 2
PIF_VALUE: 0
PIF_VALUE: 3
PIF_VALUE: 0
PIF_VALUE: 2
PIF_VALUE: 3
PIF_VALUE: 2
PIF_VALUE: 4
PIF_VALUE: 2
PIF_VALUE: 19
PIF_VALUE: 0
PIF_VALUE: 2
PIF_VALUE: 13
PIF_VALUE: 2
PIF_VALUE: 3
PIF_VALUE: 2
PIF_VALUE: 2
PIF_VALUE: 0
PIF_VALUE: 3

## 2019-04-25 ASSESSMENT — PAIN SCALES - GENERAL
PAINLEVEL_OUTOF10: 8
PAINLEVEL_OUTOF10: 0
PAINLEVEL_OUTOF10: 7

## 2019-04-25 NOTE — H&P
Ruben Mcgee MD   Physician   Urology   Progress Notes      Signed   Encounter Date:  3/26/2019               Signed        Expand All Collapse All              [facility] [address] [id number] [address]  Dept: [phone]  Loc: [phone]  Visit Date: 3/26/2019     Subjective:      Patient ID: Dulce Rebollar 48 y.o. female 1965          Chief Complaint   Patient presents with    Advice Only       new pt, intermittent microscopic hematuria, referred by Terri Dahl, CT scan prior     Incontinence       urge component, nocturia, frequency of urination . hx of IC    Advice Only       bosniak renal 1 cyst          HPI 61-year-old female first for microscopic hematuria. She denies gross hematuria. CT urogram is been reviewed which is unremarkable except for a simple cyst in the lower pole of her left kidney. Approximately 10 years ago she was told she had interstitial cystitis. He denies any bladder pain with distention.   She does however complain of frequency, urgency, urge incontinence.     Past Medical History        Past Medical History:   Diagnosis Date    Anxiety      Asthma      Autoimmune disease ([address]      Blood in urine      Bronchitis      Cataracts, bilateral      Degenerative disc disease      Depression      Frequent headaches      Hypertension      Hypothyroidism      Interstitial cystitis      Iritis 2013     [facility]      Osteopenia      Shortness of breath      Thyroid disease              Social History               Socioeconomic History    Marital status:        Spouse name: Not on file    Number of children: Not on file    Years of education: Not on file    Highest education level: Not on file   Occupational History    Not on file   Social Needs    Financial resource strain: Not on file    Food insecurity:       Worry: Not on file       Inability: Not on file   2sms needs:       Medical: Not on file       Non-medical: Not on file   Tobacco Use    Smoking status: Former Smoker       Types: Cigarettes    Smokeless tobacco: Never Used    Tobacco comment: 1 pack per week intermit for 20 yrs   Substance and Sexual Activity    Alcohol use:  No       Alcohol/week: 0.0 oz    Drug use: No    Sexual activity: Not on file   Lifestyle    Physical activity:       Days per week: Not on file       Minutes per session: Not on file    Stress: Not on file   Relationships    Social connections:       Talks on phone: Not on file       Gets together: Not on file       Attends Yarsanism service: Not on file       Active member of club or organization: Not on file       Attends meetings of clubs or organizations: Not on file       Relationship status: Not on file    Intimate partner violence:       Fear of current or ex partner: Not on file       Emotionally abused: Not on file       Physically abused: Not on file       Forced sexual activity: Not on file   Other Topics Concern    Not on file   Social History Narrative    Not on file            Family History   Family History   Problem Relation Age of Onset    Rheum Arthritis Mother      Thyroid Disease Mother      Colon Polyps Mother           esophageal polyps    Other Mother      High Blood Pressure Father      Diabetes Father      High Blood Pressure Sister      Thyroid Disease Brother      High Blood Pressure Brother      Liver Cancer Maternal Uncle      Cancer Maternal Grandfather           pancreatic cancer    Colon Cancer Neg Hx              Past Surgical History         Past Surgical History:   Procedure Laterality Date    CERVICAL FUSION   11/09     c 4,5,6    FULGURATION MINOR BLADDER LESION (W OR W/O BIOPSY)   2011    HYSTERECTOMY   4/02                  Allergies   Allergen Reactions    Abilify [Aripiprazole] Other (See Comments)       Vivid dreams    Nucynta [Tapentadol Hcl Er] Other (See Comments)       Terrible headache    Tylenol With Codeine #3 [Acetaminophen-Codeine]         Nausea     Cephalexin Nausea And Vomiting    Codeine Nausea And Vomiting    Excedrin Migraine [Aspirin-Acetaminophen-Caffeine] Nausea And Vomiting    Neurontin [Gabapentin] Anxiety    Percocet [Oxycodone-Acetaminophen] Nausea And Vomiting    Vicodin [Hydrocodone-Acetaminophen] Nausea And Vomiting           Current Medication      Current Outpatient Medications:     Mirabegron ER (MYRBETRIQ) 25 MG TB24, Take 1 tablet by mouth daily, Disp: 30 tablet, Rfl: 5    levothyroxine (SYNTHROID) 100 MCG tablet, Take 1 tablet by mouth daily, Disp: 90 tablet, Rfl: 0    cyclobenzaprine (FLEXERIL) 10 MG tablet, Take 1 tablet by mouth nightly as needed for Muscle spasms, Disp: 30 tablet, Rfl: 1    buPROPion (WELLBUTRIN XL) 300 MG extended release tablet, Take 1 tablet by mouth every morning, Disp: 30 tablet, Rfl: 2    lamoTRIgine (LAMICTAL) 200 MG tablet, Take 1 tablet by mouth daily, Disp: 30 tablet, Rfl: 2    butalbital-acetaminophen-caffeine (FIORICET, ESGIC) -40 MG per tablet, TAKE 1 TO 2 TABLETS BY MOUTH EVERY 6 HOURS AS NEEDED FOR HEADACHE, Disp: 180 tablet, Rfl: 1    diazepam (VALIUM) 5 MG tablet, Take 1 tablet by mouth every 8 hours as needed for Anxiety or Sleep (spasms). ., Disp: 90 tablet, Rfl: 0    clobetasol prop emollient base (CLOBETASOL PROPIONATE E) 0.05 % CREA, Apply topically daily, Disp: 60 g, Rfl: 1    lisinopril (PRINIVIL;ZESTRIL) 20 MG tablet, Take 1 tablet by mouth daily, Disp: 30 tablet, Rfl: 5    fluticasone-salmeterol (ADVAIR DISKUS) 100-50 MCG/DOSE diskus inhaler, Inhale 1 puff into the lungs every 12 hours, Disp: 1 Inhaler, Rfl: 5    albuterol sulfate  (90 Base) MCG/ACT inhaler, Inhale 2 puffs into the lungs every 6 hours as needed for Wheezing, Disp: 1 Inhaler, Rfl: 5    hydrocortisone 2.5 % cream, Apply topically 2 times daily. , Disp: 1 Tube, Rfl: 1    lidocaine (XYLOCAINE) 2 % jelly, Apply 2-3 am sending a prescription for myrbetriq 25 mg daily. I will see her back in follow-up in 2 or 3 weeks at which time she will undergo diagnostic cystoscopy.                       Office Visit on 3/26/2019            Revision History            Detailed Report           Note shared with patient   Progress Notes Info     Author Note Status Last Update User   Octavio Lozada MD Signed Octavio Lozada MD   Last Update Date/Time: 3/26/2019  3:30 PM   Chart Review Routing History     Routing history could not be found for this note. This is because the note has never been routed or because communication record creation was suppressed.

## 2019-04-25 NOTE — PROGRESS NOTES
0807  Pt. Responds slightly to name on adm. To pacu. 0810  Pt. Awake and oriented. 0820  Pt. Resting quietly with eyes closed. 0830  Pt. Attempting to urinate on bedpan. 0580  pacu criteria met. Transfer to \[facility]\"".

## 2019-04-25 NOTE — OP NOTE
Kei Vasquez 62  RECORD OF OPERATION     PATIENT NAME: Yordy Francisco               MEDICAL RECORD NO. [id number]                DATE OF PROCEDURE: 4/25/2019  SURGEON: Jose Daniel Alvarez MD  PRIMARY CARE PHYSICIAN: DEYANIRA Parkinson - SONIA        PREOPERATIVE DIAGNOSIS: dysuria with probable interstitial cystitis     POSTOPERATIVE DIAGNOSIS: same     PROCEDURE PERFORMED: cystoscopy with hydro-distention and instillation of local anesthetic. SURGEON: Roland Block. Shonna Alvarez MD    ASSISTANT(S): none     ANESTHESIA: general     BLOOD LOSS:  0  ml. SPECIMENS: none     COMPLICATIONS:  None immediately appreciated. DISCUSSION:  Evelin Hull is a 48y.o.-year-old female who has a diagnosis of PAINFUL BLADDER SYNDROME. After a history and physical examination was performed, potential diagnostic and therapeutic modalities were discussed with the patient. Cystoscopy with hydro distention was recommended and a discussion of the risks, possible complications, possible side effects, along with the anticipated benefits were reviewed. She was given the opportunity to ask questions. Once answered, informed consent was obtained. She was brought to the operating on 4/25/2019  procedure. Evelin Hull was brought to the OR and placed supine on the operating room table. After initiation of anesthesia, she was placed in a dorsal lithotomy position and her groin was prepped and she was draped in the standard fashion for cystoscopy. A 22-Eritrean cystoscope was passed per urethra into the bladder. The urethra was evaluated on the way in and then again on the way out and was found to be normal.      The bladder was evaluated in its endoscopic entirety and found to have NO LESIONS. There were no tumors, stones, ulcers or foreign bodies. There were no other mucosal abnormalities. The ureteral orifices were seen and were normal.   The bladder was then hydro-distended to 50 cm of water pressure.   This was held for 90 seconds and then the bladder was drained. There was diffuse glomerulations seen after the hydro distention. Bladder capacity was 600 cc. The scope was removed. Using a red rubber catheter 30 cc of 1/2 percent Sensorcain eand 50 ml of 50% DMSO was instilled into the bladder. Marcell Estrada was then transferred from the operating room table to the stretcher. She was taken to the PACU.     Marcell Estrada tolerated the procedure well and there were no complications

## 2019-04-25 NOTE — PROGRESS NOTES
Alert. Family at bedside. crackers and pepsi given. Side rails up bed in low position.  Call light in reach

## 2019-04-25 NOTE — ANESTHESIA PRE PROCEDURE
Department of Anesthesiology  Preprocedure Note       Name:  Jose Suazo   Age:  48 y.o.  :  1965                                          MRN:  [id number]         Date:  2019      Surgeon: Kaye Garcia):  Kendrick Lucio MD    Procedure: CYSTOSCOPY WITH HYDRODISTENTION WITH INSTILLATION OF DMSO AND MARCAINE (N/A )    Medications prior to admission:   Prior to Admission medications    Medication Sig Start Date End Date Taking? Authorizing Provider   lubiprostone (AMITIZA) 24 MCG capsule Take 1 capsule by mouth 2 times daily (with meals) 19  Yes Kendrick Lucio MD   diazepam (VALIUM) 5 MG tablet Take 1 tablet by mouth every 8 hours as needed for Anxiety or Sleep (spasms).  19 Yes DEYANIRA Riggins CNP   levothyroxine (SYNTHROID) 100 MCG tablet Take 1 tablet by mouth daily 3/12/19  Yes DEYANIRA Riggins CNP   cyclobenzaprine (FLEXERIL) 10 MG tablet Take 1 tablet by mouth nightly as needed for Muscle spasms 3/5/19  Yes DEYANIRA Riggins CNP   buPROPion (WELLBUTRIN XL) 300 MG extended release tablet Take 1 tablet by mouth every morning 19  Yes DEYANIRA Rodriguez CNP   lamoTRIgine (LAMICTAL) 200 MG tablet Take 1 tablet by mouth daily 19  Yes DEYANIRA Rodriguez CNP   butalbital-acetaminophen-caffeine (FIORICET, ESGIC) -40 MG per tablet TAKE 1 TO 2 TABLETS BY MOUTH EVERY 6 HOURS AS NEEDED FOR HEADACHE 19  Yes DEYANIRA Riggins CNP   clobetasol prop emollient base (CLOBETASOL PROPIONATE E) 0.05 % CREA Apply topically daily 18  Yes DEYANIRA Riggins CNP   lisinopril (PRINIVIL;ZESTRIL) 20 MG tablet Take 1 tablet by mouth daily 18  Yes DEYANIRA Riggins CNP   fluticasone-salmeterol (ADVAIR DISKUS) 100-50 MCG/DOSE diskus inhaler Inhale 1 puff into the lungs every 12 hours 18  Yes DEYANIRA Riggins CNP   albuterol sulfate  (90 Base) MCG/ACT inhaler Inhale 2 puffs into the lungs every 6 hours as needed for Wheezing 8/14/18  Yes DEYANIRA Jacinto CNP   hydrocortisone 2.5 % cream Apply topically 2 times daily. 8/14/18  Yes DEYANIRA Ulloa CNP   lidocaine (XYLOCAINE) 2 % jelly Apply 2-3 times daily 6/30/16  Yes DEYANIRA Ulloa CNP       Current medications:    Current Facility-Administered Medications   Medication Dose Route Frequency Provider Last Rate Last Dose    0.9 % sodium chloride infusion   Intravenous Continuous Monica Long 100 mL/hr at 04/25/19 0701      ciprofloxacin (CIPRO) IVPB 400 mg  400 mg Intravenous 30 Min Pre-Op Monica Long        dimethyl sulfoxide 50 % solution 50 mL  50 mL Urethral Once Monica Long           Allergies:     Allergies   Allergen Reactions    Abilify [Aripiprazole] Other (See Comments)     Vivid dreams    Cephalexin Nausea And Vomiting    Codeine Nausea And Vomiting    Excedrin Migraine [Aspirin-Acetaminophen-Caffeine] Nausea And Vomiting    Neurontin [Gabapentin] Anxiety    Nucynta [Tapentadol Hcl Er] Other (See Comments)     Terrible headache    Percocet [Oxycodone-Acetaminophen] Nausea And Vomiting    Tramadol Nausea And Vomiting    Tylenol With Codeine #3 [Acetaminophen-Codeine]      Nausea     Vicodin [Hydrocodone-Acetaminophen] Nausea And Vomiting       Problem List:    Patient Active Problem List   Diagnosis Code    Hypothyroidism E03.9    Asthma J45.909    Hypertension I10    Degenerative disc disease ILL4157    Depressive disorder due to another medical condition with depressive features F06.31       Past Medical History:        Diagnosis Date    Anxiety     Asthma     Autoimmune disease ([facility] [address]     Blood in urine     Bronchitis     Cataracts, bilateral     Degenerative cervical disc     Degenerative disc disease     Depression     Frequent headaches     Hypertension     Hypothyroidism     Interstitial cystitis     Iritis 2013    OU      Osteopenia     Shortness of breath     Thyroid disease        Past Surgical History:        Procedure Laterality Date    CERVICAL FUSION      c 4,5,6    FULGURATION MINOR BLADDER LESION (W OR W/O BIOPSY)      HYSTERECTOMY      INCONTINENCE SURGERY         Social History:    Social History     Tobacco Use    Smoking status: Former Smoker     Types: Cigarettes     Last attempt to quit:      Years since quittin.3    Smokeless tobacco: Never Used    Tobacco comment: 1 pack per week intermit for 20 yrs   Substance Use Topics    Alcohol use: No     Alcohol/week: 0.0 oz                                Counseling given: Not Answered  Comment: 1 pack per week intermit for 20 yrs      Vital Signs (Current):   Vitals:    19 1120 19 0615   BP:  116/68   Pulse:  68   Resp:  12   Temp:  97.6 °F (36.4 °C)   TempSrc:  Temporal   SpO2:  98%   Weight: 124 lb (56.2 kg) 125 lb (56.7 kg)   Height: 5' (1.524 m) 5' (1.524 m)                                              BP Readings from Last 3 Encounters:   19 116/68   19 132/82   19 132/86       NPO Status: Time of last liquid consumption: 0500                        Time of last solid consumption:                         Date of last liquid consumption: 19                        Date of last solid food consumption: 19    BMI:   Wt Readings from Last 3 Encounters:   19 125 lb (56.7 kg)   19 124 lb (56.2 kg)   19 125 lb (56.7 kg)     Body mass index is 24.41 kg/m².     CBC:   Lab Results   Component Value Date    WBC 4.7 2019    RBC 4.36 2019    RBC 4.32 2012    HGB 12.9 2019    HCT 39.9 2019    MCV 91.5 2019    RDW 13.2 2018     2019       CMP:   Lab Results   Component Value Date     2019    K 4.0 2019     2019    CO2 23 2019    BUN 15 2019    CREATININE 0.7 2019    LABGLOM 88 2019    GLUCOSE 103 2019    GLUCOSE 87 2012    PROT 7.2 2019    CALCIUM 9.6 03/08/2019    BILITOT 0.3 03/08/2019    ALKPHOS 69 03/08/2019    AST 22 03/08/2019    ALT 20 03/08/2019       POC Tests: No results for input(s): POCGLU, POCNA, POCK, POCCL, POCBUN, POCHEMO, POCHCT in the last 72 hours. Coags:   Lab Results   Component Value Date    PROTIME 0.93 07/08/2011    APTT 26.2 07/08/2011       HCG (If Applicable):   Lab Results   Component Value Date    PREGSERUM NEGATIVE 12/18/2012        ABGs: No results found for: PHART, PO2ART, RLO3ZZP, YNH0SVV, BEART, N4ANDOXN     Type & Screen (If Applicable):  Lab Results   Component Value Date    LABRH POS 07/14/2011       Anesthesia Evaluation    Airway: Mallampati: II       Mouth opening: > = 3 FB Dental:          Pulmonary:   (+) asthma:                            Cardiovascular:    (+) hypertension:,         Rhythm: regular                      Neuro/Psych:   (+) headaches:,             GI/Hepatic/Renal:             Endo/Other:    (+) hypothyroidism::., .                 Abdominal:           Vascular:                                        Anesthesia Plan      general     ASA 2       Induction: intravenous. Anesthetic plan and risks discussed with patient. Plan discussed with CRNA.                   Saray Joya MD   4/25/2019

## 2019-04-25 NOTE — BRIEF OP NOTE
Brief Postoperative Note  ______________________________________________________________    Patient: Lexis Pastrana  YOB: 1965  MRN: [id number]  Date of Procedure: 4/25/2019    Pre-Op Diagnosis: INTERSTITIAL CYSTITIS / MICROSCOPIC HEMATURIA    Post-Op Diagnosis: Same       Procedure(s):  CYSTOSCOPY WITH HYDRODISTENTION WITH INSTILLATION OF DMSO AND MARCAINE    Anesthesia: General    Surgeon(s):  Caitlin Polo MD    Assistant: [facility]      Estimated Blood Loss (mL): less than 50     Complications: None    Specimens:   * No specimens in log *    Implants:  * No implants in log *      Drains: * No LDAs found *    Findings: BLADDER CAPACITY 600 ML; DIFFUSE GLOMERULATIONS POST DISTENTION.     Madison Stark MD  Date: 4/25/2019  Time: 8:10 AM

## 2019-04-25 NOTE — ANESTHESIA POSTPROCEDURE EVALUATION
Department of Anesthesiology  Postprocedure Note    Patient: Mariola Jackson  MRN: [id number]  YOB: 1965  Date of evaluation: 4/25/2019  Time:  10:38 AM     Procedure Summary     Date:  04/25/19 Room / Location:  [facility]  / [facility] OR    Anesthesia Start:  0740 Anesthesia Stop:  0809    Procedure:  CYSTOSCOPY WITH HYDRODISTENTION WITH INSTILLATION OF DMSO AND MARCAINE (N/A ) Diagnosis:  (INTERSTITIAL CYSTITIS / MICROSCOPIC HEMATURIA)    Surgeon:  Alejandra Grossman MD Responsible Provider:  Brennan Rhodes MD    Anesthesia Type:  general ASA Status:  2          Anesthesia Type: general    Ulisses Phase I: Ulisses Score: 10    Ulisses Phase II: Ulisses Score: 10    Last vitals: Reviewed and per EMR flowsheets. Anesthesia Post Evaluation   [address]  POST-ANESTHESIA NOTE       Name:  Mariola Jackson                                         Age:  48 y.o.   MRN:  [id number]      Last Vitals:  /61   Pulse 71   Temp 97.5 °F (36.4 °C) (Temporal)   Resp 18   Ht 5' (1.524 m)   Wt 125 lb (56.7 kg)   SpO2 99%   BMI 24.41 kg/m²   Patient Vitals for the past 4 hrs:   BP Temp Temp src Pulse Resp SpO2   04/25/19 1000 128/61 97.5 °F (36.4 °C) Temporal 71 18 99 %   04/25/19 0921 126/67 -- -- 66 18 100 %   04/25/19 0850 (!) 125/58 97.9 °F (36.6 °C) Temporal 66 18 100 %   04/25/19 0835 128/63 -- -- 65 13 98 %   04/25/19 0830 (!) 117/59 -- -- 74 19 100 %   04/25/19 0825 (!) 121/57 -- -- 63 12 98 %   04/25/19 0820 132/62 -- -- 65 15 98 %   04/25/19 0815 126/66 -- -- 73 11 100 %   04/25/19 0810 124/64 -- -- 77 13 100 %   04/25/19 0807 124/64 98.3 °F (36.8 °C) Temporal 76 11 100 %       Level of Consciousness:  Awake    Respiratory:  Stable    Oxygen Saturation:  Stable    Cardiovascular:  Stable    Hydration:  Adequate    PONV:  Stable    Post-op Pain:  Adequate analgesia    Post-op Assessment:  No apparent anesthetic complications    Additional Follow-Up / Treatment / Comment:  None    Brennan Rhodes MD  April 25, 2019 10:38 AM

## 2019-05-07 ENCOUNTER — OFFICE VISIT (OUTPATIENT)
Dept: UROLOGY | Age: 54
End: 2019-05-07
Payer: COMMERCIAL

## 2019-05-07 VITALS
SYSTOLIC BLOOD PRESSURE: 138 MMHG | BODY MASS INDEX: 24.74 KG/M2 | HEIGHT: 60 IN | DIASTOLIC BLOOD PRESSURE: 82 MMHG | WEIGHT: 126 LBS

## 2019-05-07 DIAGNOSIS — N30.10 INTERSTITIAL CYSTITIS: Primary | ICD-10-CM

## 2019-05-07 LAB
BILIRUBIN URINE: NEGATIVE
BLOOD URINE, POC: ABNORMAL
CHARACTER, URINE: CLEAR
COLOR, URINE: YELLOW
GLUCOSE URINE: NEGATIVE MG/DL
KETONES, URINE: NEGATIVE
LEUKOCYTE CLUMPS, URINE: NEGATIVE
NITRITE, URINE: NEGATIVE
PH, URINE: 7 (ref 5–9)
POST VOID RESIDUAL (PVR): 0 ML
PROTEIN, URINE: NEGATIVE MG/DL
SPECIFIC GRAVITY, URINE: 1.01 (ref 1–1.03)
UROBILINOGEN, URINE: 0.2 EU/DL (ref 0–1)

## 2019-05-07 PROCEDURE — 99213 OFFICE O/P EST LOW 20 MIN: CPT | Performed by: UROLOGY

## 2019-05-07 PROCEDURE — 51798 US URINE CAPACITY MEASURE: CPT | Performed by: UROLOGY

## 2019-05-07 PROCEDURE — 81003 URINALYSIS AUTO W/O SCOPE: CPT | Performed by: UROLOGY

## 2019-05-07 NOTE — PROGRESS NOTES
Subjective:      Patient ID: Chan Beckwith is a 48 y.o. female. HPI 72-year-old female returns today after cystoscopy with hydrodistention. Patient clinical findings and cystoscopic findings were consistent with chronic interstitial cystitis. Currently she has lower urinary tract symptoms and pressure over her bladder. Residual urine volume today zero mL. Her urine specimen today shows microscopic hematuria as expected. Review of Systems    Objective:   Physical Exam well-nourished well-developed female alert and oriented ×3. Assessment:      interstitial cystitis. She is currently on Elavil 10 mg by mouth daily at bedtime to help with bladder pain. I discussed DMSO intravesical therapy. I told her that this is weekly instillations or 6 weeks and then a tapering of the medication to the minimum requirement to keep her bladder symptoms under control. Plan:      electronic prescription for 6 miles a DMSO sent in to [place]. Return in one week for first installation.         Atif Sheets MD

## 2019-05-10 ENCOUNTER — TELEPHONE (OUTPATIENT)
Dept: UROLOGY | Age: 54
End: 2019-05-10

## 2019-05-10 NOTE — TELEPHONE ENCOUNTER
Tera Talavera calls stating her insurance informed her that they denied the medication that is needed for a procedure she is having done in the office. She said [facility] faxed the information that was needed by Dr Vicente Costello but they have not heard from the office. She is concerned that this has not been done since her appointment is on Tuesday 5/14/19. Please advise.     [facility] [address]    DOLV  5/7/19  DONSCOTTIE  5/14/19

## 2019-05-13 ENCOUNTER — TELEPHONE (OUTPATIENT)
Dept: UROLOGY | Age: 54
End: 2019-05-13

## 2019-05-13 NOTE — TELEPHONE ENCOUNTER
Prior Auth initiated for [facility] . PA sent to [employer]. Should receive response in 3-5 days.

## 2019-05-20 NOTE — TELEPHONE ENCOUNTER
Called [employer] and PA for [place] 50% is still pending. Per John Rice they have 15 days which would be 5/28/19. Pt will need rescheduled.

## 2019-05-20 NOTE — TELEPHONE ENCOUNTER
Called [employer] and PA for [place] 50% is still pending. Per Gladystine Glance they have 15 days which would be 5/28/19. Pt will need rescheduled. Pt notified and rescheduled to 5/30/19 at 1:45.

## 2019-05-23 ENCOUNTER — TELEPHONE (OUTPATIENT)
Dept: UROLOGY | Age: 54
End: 2019-05-23

## 2019-05-23 NOTE — TELEPHONE ENCOUNTER
Per Juliana Kirk at [facility] procedure 88710 instillation into the bladder does not need a Prior auth but patient will have deductable and copay. The DMSO cocktail meds Demethyl Sulfoxide, Bupivacaine, Sodium Bicarbonate and Heparin 73701 u/ml do not need prior auths but also patient will have deductable and copay. Juliana Kirk also asked if a MD or DO was doing the procedure. I told her MD would be.   Ref # for this call is [id number]

## 2019-05-23 NOTE — TELEPHONE ENCOUNTER
Insurance denied Rimso due to it is not something patient gives to themself  as a provider has to give it. This may be approved if it is under medical benefit and buy and bill type procedure. Would you like me to try and run it through medical and we buy and bill it?

## 2019-05-29 ENCOUNTER — TELEPHONE (OUTPATIENT)
Dept: UROLOGY | Age: 54
End: 2019-05-29

## 2019-05-29 ENCOUNTER — OFFICE VISIT (OUTPATIENT)
Dept: PSYCHIATRY | Age: 54
End: 2019-05-29
Payer: COMMERCIAL

## 2019-05-29 DIAGNOSIS — F31.81 BIPOLAR II DISORDER (HCC): Primary | ICD-10-CM

## 2019-05-29 PROCEDURE — 99213 OFFICE O/P EST LOW 20 MIN: CPT | Performed by: NURSE PRACTITIONER

## 2019-05-29 RX ORDER — LAMOTRIGINE 200 MG/1
200 TABLET ORAL DAILY
Qty: 30 TABLET | Refills: 2 | Status: SHIPPED | OUTPATIENT
Start: 2019-05-29 | End: 2019-09-04 | Stop reason: SDUPTHER

## 2019-05-29 RX ORDER — BUPROPION HYDROCHLORIDE 300 MG/1
300 TABLET ORAL EVERY MORNING
Qty: 30 TABLET | Refills: 2 | Status: SHIPPED | OUTPATIENT
Start: 2019-05-29 | End: 2019-09-04 | Stop reason: SDUPTHER

## 2019-05-29 NOTE — TELEPHONE ENCOUNTER
Please move patient's appointment out to next week on Thrusday because patient's medication will not arrive until next week. Thank you.

## 2019-05-29 NOTE — PROGRESS NOTES
Subjective:      Patient ID: Priyank Fortune is a 48 y.o. female. CC: Bipolar II D/O    HPI  Maddy Addison is seen for follow up and  medication management. Feels meds continue to work \"ok\" without side effects. . Denies having a rash. Good med compliance is reported. Denies mood swings. Denies depression. Denies feelings of harm towards self or others. Reports  pain is still her primary issue.  Reports she is still  thinking about getting back into counseling at [facility]. Angelita Moore Reports she and  are still getting along well but was Dx with early stages of Dementia.  Labs reviewed drawn 5-8-2019. No critical abnormals noted. Another TSH low. Reports PCP is addressing. Noted GFR = 88 reflects mild kidney compromise. Reports PCP is aware. Reports still  being on Valium Rxed by PCP for anxiety and sleep. Client advised again Valium would not be Rxed from this office.      Past Medical Hx:  Reports allergies to Tramadol. Codeine, Percocets, Vicodin  Reports having Hypothyroidism, Migraine Headaches, HTN. DDD, Asthma, Osteoarthritis  Reports partial hysterectomy 2001     Past Psychiatric Hx:  Denies any past psych hospitalizations. Denies any past suicidal gestures. Denies ever being under care of psychiatrist. Reports receives counseling at [facility]. Currently xanax Rxed by PCP and Valium Rxed by PCP. 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 MARITAL STATUS: Currently  2nd marriage. For 24 years. Reports relationship is good. OCCUPATION: Currently on Disability. Last worked at [employer] left there 2015  [address]: Reports having associates degree in [employer]. LIVING SITUATION: Lives with  in [facility]. Has 2 grown children. LEGAL:  Reports past 2 DUI's.  Last was

## 2019-06-04 ENCOUNTER — HOSPITAL ENCOUNTER (OUTPATIENT)
Age: 54
Discharge: HOME | End: 2019-06-04
Payer: COMMERCIAL

## 2019-06-04 DIAGNOSIS — E03.9 ACQUIRED HYPOTHYROIDISM: Chronic | ICD-10-CM

## 2019-06-04 LAB
T4 FREE: 1.72 NG/DL (ref 0.93–1.76)
TSH SERPL DL<=0.05 MIU/L-ACNC: 0.1 UIU/ML (ref 0.4–4.2)

## 2019-06-04 PROCEDURE — 84443 ASSAY THYROID STIM HORMONE: CPT

## 2019-06-04 PROCEDURE — 36415 COLL VENOUS BLD VENIPUNCTURE: CPT

## 2019-06-04 PROCEDURE — 84439 ASSAY OF FREE THYROXINE: CPT

## 2019-06-05 DIAGNOSIS — E03.9 HYPOTHYROIDISM, UNSPECIFIED TYPE: Primary | ICD-10-CM

## 2019-06-05 NOTE — TELEPHONE ENCOUNTER
Pt called requesting thyroid lab results from yesterday and pending dose will need Rx sent to [facility]. She is leaving on vacation Friday morning.  CPB

## 2019-06-06 RX ORDER — LEVOTHYROXINE SODIUM 88 UG/1
88 TABLET ORAL DAILY
Qty: 30 TABLET | Refills: 1 | Status: SHIPPED | OUTPATIENT
Start: 2019-06-06 | End: 2019-08-05 | Stop reason: SDUPTHER

## 2019-06-06 NOTE — TELEPHONE ENCOUNTER
Levothyroxine was escribed to [facility]. Lab orders mailed to repeat in 6 wks. Pt was informed.

## 2019-06-11 ENCOUNTER — TELEPHONE (OUTPATIENT)
Dept: UROLOGY | Age: 54
End: 2019-06-11

## 2019-06-11 NOTE — TELEPHONE ENCOUNTER
DMSO medication received today from [facility]. Please call patient and ask her if she would like to come in on Dr Rosas President schedule sooner than scheduled since we have medication now. She had been rescheduled since we didn't have the medication but it is in medicine cabinet here in office now. Thanks.

## 2019-06-11 NOTE — TELEPHONE ENCOUNTER
Called and offered earlier appointment however Maddy Addison is out of town until [place] so she will keep her appointment on June 19.

## 2019-06-20 ENCOUNTER — OFFICE VISIT (OUTPATIENT)
Dept: UROLOGY | Age: 54
End: 2019-06-20
Payer: COMMERCIAL

## 2019-06-20 VITALS
BODY MASS INDEX: 24.54 KG/M2 | HEIGHT: 60 IN | SYSTOLIC BLOOD PRESSURE: 122 MMHG | WEIGHT: 125 LBS | DIASTOLIC BLOOD PRESSURE: 78 MMHG

## 2019-06-20 DIAGNOSIS — N30.10 INTERSTITIAL CYSTITIS: Primary | ICD-10-CM

## 2019-06-20 LAB
BILIRUBIN URINE: NEGATIVE
BLOOD URINE, POC: ABNORMAL
CHARACTER, URINE: CLEAR
COLOR, URINE: YELLOW
GLUCOSE URINE: NEGATIVE MG/DL
KETONES, URINE: NEGATIVE
LEUKOCYTE CLUMPS, URINE: NEGATIVE
NITRITE, URINE: NEGATIVE
PH, URINE: 7 (ref 5–9)
PROTEIN, URINE: NEGATIVE MG/DL
SPECIFIC GRAVITY, URINE: 1.01 (ref 1–1.03)
UROBILINOGEN, URINE: 0.2 EU/DL (ref 0–1)

## 2019-06-20 PROCEDURE — 81003 URINALYSIS AUTO W/O SCOPE: CPT | Performed by: UROLOGY

## 2019-06-20 PROCEDURE — 51700 IRRIGATION OF BLADDER: CPT | Performed by: UROLOGY

## 2019-06-20 NOTE — PROGRESS NOTES
Following Dr. Tomer Man of [facility]. After insuring patient does not have any symptoms of a Urinary Tract Infection, patient is wiped with betadine solution to cleanse urethra opening. 16 Fr 16 catheter is inserted without difficulty and bladder is completely emptied. DMSO (Dimethyl Sulfoxide) 50ml is then drawn up and placed in sterile container. Syringe is then hooked into the catheter and 50ml of DMSO is dumped into the syringe. Patient did not have a bladder spasms during instillation. Once all the solution was through, the catheter was removed from urethra and discarded into biohazard bag. DMSO (Dimethyl Sulfoxide) 50% solution INSTILLED WITHOUT DIFFICULTY. INSTRUCTIONS REVIEWED WITH PATIENT. Lot Number: [id number]  Expiration Date: 06/2021  [address] #: [id number]      Patient was instructed to hold urine for 10 minutes and then empty bladder. Patient is to call office if any reactions or concerns.      Patient supplied medication No

## 2019-06-20 NOTE — PROGRESS NOTES
10ml of 2%Lidocaine urojet inserted into the urethra and sterile swab was inserted for 5 minutes before DMSO procedure.

## 2019-06-20 NOTE — PROGRESS NOTES
61-year-old female returns today to start DMSO intravesical therapy for her interstitial cystitis. She has bladder pain today. She has moderate amount of blood on urinary dipstick today. I reviewed purpose of DMSO, its mechanism of action, common side effects such as mild dysuria, frequency, and a garlic odor to her breath temporarily. Impression: interstitial cystitis. Plan of care: She will get weekly DMSO intravesical treatments as long as she can tolerate them. We will then try to wean her off the medication gradually. Reviewed, released, authenticated and confirmed by Dr. Carlton Stevenson.

## 2019-07-03 DIAGNOSIS — F33.41 RECURRENT MAJOR DEPRESSIVE DISORDER, IN PARTIAL REMISSION (HCC): ICD-10-CM

## 2019-07-03 DIAGNOSIS — M96.1 FAILED NECK SYNDROME: ICD-10-CM

## 2019-07-03 RX ORDER — DIAZEPAM 5 MG/1
5 TABLET ORAL EVERY 8 HOURS PRN
Qty: 90 TABLET | Refills: 0 | OUTPATIENT
Start: 2019-07-03 | End: 2020-07-02

## 2019-07-04 ENCOUNTER — TELEPHONE (OUTPATIENT)
Dept: UROLOGY | Age: 54
End: 2019-07-04

## 2019-07-09 ENCOUNTER — OFFICE VISIT (OUTPATIENT)
Dept: FAMILY MEDICINE CLINIC | Age: 54
End: 2019-07-09
Payer: COMMERCIAL

## 2019-07-09 VITALS
BODY MASS INDEX: 24.26 KG/M2 | RESPIRATION RATE: 16 BRPM | WEIGHT: 124.2 LBS | SYSTOLIC BLOOD PRESSURE: 138 MMHG | DIASTOLIC BLOOD PRESSURE: 76 MMHG | TEMPERATURE: 98.4 F | HEART RATE: 66 BPM

## 2019-07-09 DIAGNOSIS — M96.1 FAILED BACK SYNDROME: Primary | ICD-10-CM

## 2019-07-09 DIAGNOSIS — Z98.1 HISTORY OF FUSION OF CERVICAL SPINE: ICD-10-CM

## 2019-07-09 DIAGNOSIS — G89.29 CHRONIC NECK PAIN: ICD-10-CM

## 2019-07-09 DIAGNOSIS — J45.20 MILD INTERMITTENT REACTIVE AIRWAY DISEASE WITHOUT COMPLICATION: ICD-10-CM

## 2019-07-09 DIAGNOSIS — M96.1 FAILED NECK SYNDROME: ICD-10-CM

## 2019-07-09 DIAGNOSIS — M54.2 CHRONIC NECK PAIN: ICD-10-CM

## 2019-07-09 DIAGNOSIS — G44.86 CERVICOGENIC HEADACHE: ICD-10-CM

## 2019-07-09 DIAGNOSIS — F33.41 RECURRENT MAJOR DEPRESSIVE DISORDER, IN PARTIAL REMISSION (HCC): ICD-10-CM

## 2019-07-09 DIAGNOSIS — E03.9 HYPOTHYROIDISM, UNSPECIFIED TYPE: ICD-10-CM

## 2019-07-09 DIAGNOSIS — N30.10 IC (INTERSTITIAL CYSTITIS): ICD-10-CM

## 2019-07-09 PROCEDURE — 99214 OFFICE O/P EST MOD 30 MIN: CPT | Performed by: NURSE PRACTITIONER

## 2019-07-09 RX ORDER — MONTELUKAST SODIUM 10 MG/1
10 TABLET ORAL NIGHTLY
Qty: 30 TABLET | Refills: 5 | Status: SHIPPED | OUTPATIENT
Start: 2019-07-09 | End: 2020-08-11 | Stop reason: SDUPTHER

## 2019-07-09 RX ORDER — DIAZEPAM 5 MG/1
5 TABLET ORAL EVERY 8 HOURS PRN
Qty: 90 TABLET | Refills: 0 | Status: SHIPPED | OUTPATIENT
Start: 2019-07-09 | End: 2019-09-16 | Stop reason: SDUPTHER

## 2019-07-12 ASSESSMENT — ENCOUNTER SYMPTOMS
EYE PAIN: 0
SHORTNESS OF BREATH: 1
CHEST TIGHTNESS: 0
APNEA: 0
ABDOMINAL PAIN: 0
RHINORRHEA: 0
WHEEZING: 1
ABDOMINAL DISTENTION: 0
PHOTOPHOBIA: 0
EYE DISCHARGE: 0
COUGH: 0
BACK PAIN: 1

## 2019-07-12 NOTE — PROGRESS NOTES
distention and abdominal pain. Genitourinary: Negative for decreased urine volume, difficulty urinating, dysuria, flank pain, frequency, hematuria and urgency. Musculoskeletal: Positive for arthralgias, back pain, myalgias, neck pain and neck stiffness. Negative for joint swelling. Skin: Negative. Neurological: Negative for dizziness, tremors, light-headedness, numbness and headaches. Hematological: Negative for adenopathy. Psychiatric/Behavioral: Positive for decreased concentration and dysphoric mood. Negative for confusion. The patient is nervous/anxious. All other systems reviewed and are negative.        Ref Range & Units 3wk ago   Glucose, Ur NEGATIVE mg/dl Negative    Bilirubin Urine  Negative    Ketones, Urine NEGATIVE Negative    Specific Gravity, Urine 1.002 - 1.03 1.015    Blood, UA POC NEGATIVE ModerateAbnormal     pH, Urine 5.0 - 9.0 7.00    Protein, Urine NEGATIVE mg/dl Negative    Urobilinogen, Urine 0.0 - 1.0 eu/dl 0.20    Nitrite, Urine NEGATIVE Negative    Leukocyte Clumps, Urine NEGATIVE Negative    Color, Urine YELLOW-STR Yellow    Character, Urine [id number] Clear    Comment: Performed at [facility] Office under CLIA # [id number]               Result Notes for T4, Free     Notes recorded by Kevan Salgado RN on 6/6/2019 at 9:48 AM EDT  See 6/5/19 refill encounter   T4, Free   Order: [id number]   Collected:  6/4/2019 12:17   View Full Report   Ref Range & Units 1mo ago   T4 Free 0.93 - 1.76 ng/dL 1.72    Comment: Performed at [address], [address]               Result Notes for TSH without Reflex     Notes recorded by Kevan Salgado RN on 6/6/2019 at 9:48 AM EDT  See 6/5/19 refill encounter   Contains abnormal data TSH without Reflex   Order: [id number]   Collected:  6/4/2019 12:17   View Full Report   Ref Range & Units 1mo ago   TSH 0.400 - 4.20 uIU/mL 0.098Low     Comment: [facility]formed at [facility] [address]

## 2019-07-17 ENCOUNTER — TELEPHONE (OUTPATIENT)
Dept: FAMILY MEDICINE CLINIC | Age: 54
End: 2019-07-17

## 2019-07-18 ENCOUNTER — NURSE ONLY (OUTPATIENT)
Dept: UROLOGY | Age: 54
End: 2019-07-18
Payer: COMMERCIAL

## 2019-07-18 DIAGNOSIS — N30.10 INTERSTITIAL CYSTITIS: Primary | ICD-10-CM

## 2019-07-18 LAB
BILIRUBIN URINE: NEGATIVE
BLOOD URINE, POC: ABNORMAL
CHARACTER, URINE: CLEAR
COLOR, URINE: YELLOW
GLUCOSE URINE: NEGATIVE MG/DL
KETONES, URINE: NEGATIVE
LEUKOCYTE CLUMPS, URINE: NEGATIVE
NITRITE, URINE: NEGATIVE
PH, URINE: 6 (ref 5–9)
PROTEIN, URINE: 30 MG/DL
SPECIFIC GRAVITY, URINE: >= 1.03 (ref 1–1.03)
UROBILINOGEN, URINE: 0.2 EU/DL (ref 0–1)

## 2019-07-18 PROCEDURE — 81003 URINALYSIS AUTO W/O SCOPE: CPT | Performed by: UROLOGY

## 2019-07-18 PROCEDURE — 51700 IRRIGATION OF BLADDER: CPT | Performed by: UROLOGY

## 2019-07-18 RX ORDER — BUTALBITAL, ACETAMINOPHEN AND CAFFEINE 50; 325; 40 MG/1; MG/1; MG/1
TABLET ORAL
Qty: 180 TABLET | Refills: 1 | Status: SHIPPED | OUTPATIENT
Start: 2019-07-18 | End: 2019-09-16 | Stop reason: SDUPTHER

## 2019-07-18 NOTE — PROGRESS NOTES
Patient has given me verbal consent to perform [facility]. After insuring patient does not have any symptoms of a Urinary Tract Infection, patient is wiped with betadine solution to cleanse urethra opening. 14 Fr Straight catheter is inserted without difficulty and bladder is completely emptied. DMSO (Dimethyl Sulfoxide) 50ml is then drawn up and placed in sterile container. Syringe is then hooked into the catheter and 50ml of DMSO is dumped into the syringe. Patient did not have a bladder spasms during instillation . Once all the solution was through, the catheter was removed from urethra and discarded into biohazard bag. DMSO (Dimethyl Sulfoxide) 50ml of 50% solution INSTILLED WITHOUT DIFFICULTY. This is treatment 2 . Lot Number: [id number]  Expiration Date: 6/2021  [facility] #: [id number]      Patient was instructed to hold urine for 10 minutes and then empty bladder. Patient is to call office if any reactions or concerns.      Patient supplied medication No

## 2019-07-25 ENCOUNTER — HOSPITAL ENCOUNTER (OUTPATIENT)
Dept: GENERAL RADIOLOGY | Age: 54
Discharge: HOME | End: 2019-07-25
Payer: COMMERCIAL

## 2019-07-25 ENCOUNTER — HOSPITAL ENCOUNTER (OUTPATIENT)
Age: 54
Discharge: HOME | End: 2019-07-25
Payer: COMMERCIAL

## 2019-07-25 ENCOUNTER — NURSE ONLY (OUTPATIENT)
Dept: UROLOGY | Age: 54
End: 2019-07-25
Payer: COMMERCIAL

## 2019-07-25 DIAGNOSIS — M54.2 CHRONIC NECK PAIN: ICD-10-CM

## 2019-07-25 DIAGNOSIS — Z98.1 HISTORY OF FUSION OF CERVICAL SPINE: ICD-10-CM

## 2019-07-25 DIAGNOSIS — M96.1 FAILED NECK SYNDROME: ICD-10-CM

## 2019-07-25 DIAGNOSIS — G44.86 CERVICOGENIC HEADACHE: ICD-10-CM

## 2019-07-25 DIAGNOSIS — G89.29 CHRONIC NECK PAIN: ICD-10-CM

## 2019-07-25 DIAGNOSIS — E03.9 HYPOTHYROIDISM, UNSPECIFIED TYPE: ICD-10-CM

## 2019-07-25 DIAGNOSIS — N30.10 INTERSTITIAL CYSTITIS: Primary | ICD-10-CM

## 2019-07-25 LAB
BILIRUBIN URINE: NEGATIVE
BLOOD URINE, POC: ABNORMAL
CHARACTER, URINE: CLEAR
COLOR, URINE: YELLOW
GLUCOSE URINE: NEGATIVE MG/DL
KETONES, URINE: NEGATIVE
LEUKOCYTE CLUMPS, URINE: ABNORMAL
NITRITE, URINE: NEGATIVE
PH, URINE: 7 (ref 5–9)
PROTEIN, URINE: ABNORMAL MG/DL
SPECIFIC GRAVITY, URINE: 1.02 (ref 1–1.03)
T4 FREE: 1.65 NG/DL (ref 0.93–1.76)
TSH SERPL DL<=0.05 MIU/L-ACNC: 0.23 UIU/ML (ref 0.4–4.2)
UROBILINOGEN, URINE: 0.2 EU/DL (ref 0–1)

## 2019-07-25 PROCEDURE — 84439 ASSAY OF FREE THYROXINE: CPT

## 2019-07-25 PROCEDURE — 81003 URINALYSIS AUTO W/O SCOPE: CPT | Performed by: UROLOGY

## 2019-07-25 PROCEDURE — 84443 ASSAY THYROID STIM HORMONE: CPT

## 2019-07-25 PROCEDURE — 36415 COLL VENOUS BLD VENIPUNCTURE: CPT

## 2019-07-25 PROCEDURE — 72040 X-RAY EXAM NECK SPINE 2-3 VW: CPT

## 2019-07-25 PROCEDURE — 51700 IRRIGATION OF BLADDER: CPT | Performed by: UROLOGY

## 2019-07-25 NOTE — PROGRESS NOTES
Patient has given me verbal consent to perform [facility] Amaya of [facility]. After insuring patient does not have any symptoms of a Urinary Tract Infection, patient is wiped with betadine solution to cleanse urethra opening. 12 Fr straight catheter is inserted without difficulty and bladder is completely emptied. DMSO (Dimethyl Sulfoxide) 50ml is then drawn up and placed in sterile container. Syringe is then hooked into the catheter and 50ml of DMSO is dumped into the syringe. Patient did have a bladder spasms during instillation. Once all the solution was through, the catheter was removed from urethra and discarded into biohazard bag. DMSO (Dimethyl Sulfoxide) 50ml of 50% solution INSTILLED WITHOUT DIFFICULTY. This is treatment 3 of 6. Lot Number: [id number]  Expiration Date: 6/2021  [address] #: [id number]        Patient was instructed to hold urine for 10 minutes and then empty bladder. Patient is to call office if any reactions or concerns.      Patient supplied medication No

## 2019-08-05 RX ORDER — LEVOTHYROXINE SODIUM 88 UG/1
88 TABLET ORAL DAILY
Qty: 90 TABLET | Refills: 2 | Status: SHIPPED | OUTPATIENT
Start: 2019-08-05 | End: 2020-04-29 | Stop reason: SDUPTHER

## 2019-08-05 RX ORDER — LEVOTHYROXINE SODIUM 88 UG/1
TABLET ORAL
Qty: 30 TABLET | Refills: 1 | OUTPATIENT
Start: 2019-08-05

## 2019-08-05 NOTE — TELEPHONE ENCOUNTER
8/5/19   Frankey Gouge called requesting a refill on the following medications:  Requested Prescriptions     Pending Prescriptions Disp Refills    levothyroxine (SYNTHROID) 88 MCG tablet 30 tablet 1     Sig: Take 1 tablet by mouth daily     Pharmacy verified:  Trish Barnes  . lori      Date of last visit:  7/9/19  Date of next visit (if applicable): Visit date not found  blm

## 2019-08-07 ENCOUNTER — NURSE ONLY (OUTPATIENT)
Dept: UROLOGY | Age: 54
End: 2019-08-07
Payer: COMMERCIAL

## 2019-08-07 DIAGNOSIS — N30.10 INTERSTITIAL CYSTITIS: Primary | ICD-10-CM

## 2019-08-07 LAB
BILIRUBIN URINE: NEGATIVE
BLOOD URINE, POC: ABNORMAL
CHARACTER, URINE: CLEAR
COLOR, URINE: YELLOW
GLUCOSE URINE: NEGATIVE MG/DL
KETONES, URINE: NEGATIVE
LEUKOCYTE CLUMPS, URINE: NEGATIVE
NITRITE, URINE: NEGATIVE
PH, URINE: 6.5 (ref 5–9)
PROTEIN, URINE: NEGATIVE MG/DL
SPECIFIC GRAVITY, URINE: 1.02 (ref 1–1.03)
UROBILINOGEN, URINE: 0.2 EU/DL (ref 0–1)

## 2019-08-07 PROCEDURE — 51700 IRRIGATION OF BLADDER: CPT | Performed by: UROLOGY

## 2019-08-07 PROCEDURE — 81003 URINALYSIS AUTO W/O SCOPE: CPT | Performed by: UROLOGY

## 2019-08-14 ENCOUNTER — TELEPHONE (OUTPATIENT)
Dept: UROLOGY | Age: 54
End: 2019-08-14

## 2019-08-29 ENCOUNTER — TELEPHONE (OUTPATIENT)
Dept: UROLOGY | Age: 54
End: 2019-08-29

## 2019-08-29 NOTE — TELEPHONE ENCOUNTER
Patient is calling in because she is supposed to come in to the office tomorrow 8/30/19 for a treatment. She is calling in to verify if the medication that was on back order came in? Please call her asap to advise if ok to keep appointment tomorrow.

## 2019-09-04 ENCOUNTER — OFFICE VISIT (OUTPATIENT)
Dept: PSYCHIATRY | Age: 54
End: 2019-09-04
Payer: COMMERCIAL

## 2019-09-04 DIAGNOSIS — F31.81 BIPOLAR II DISORDER (HCC): Primary | ICD-10-CM

## 2019-09-04 PROCEDURE — 99213 OFFICE O/P EST LOW 20 MIN: CPT | Performed by: NURSE PRACTITIONER

## 2019-09-04 RX ORDER — BUPROPION HYDROCHLORIDE 300 MG/1
300 TABLET ORAL EVERY MORNING
Qty: 30 TABLET | Refills: 2 | Status: SHIPPED | OUTPATIENT
Start: 2019-09-04 | End: 2019-12-16 | Stop reason: SDUPTHER

## 2019-09-04 RX ORDER — LAMOTRIGINE 200 MG/1
200 TABLET ORAL DAILY
Qty: 30 TABLET | Refills: 2 | Status: SHIPPED | OUTPATIENT
Start: 2019-09-04 | End: 2019-12-16 | Stop reason: SDUPTHER

## 2019-09-04 NOTE — PROGRESS NOTES
Subjective:      Patient ID: Sima Pina is a 48 y.o. female. CC: Bipolar II D/O     HPI  Nela Box is seen for follow up and  medication management. Reports meds continue to work well without side effects.  .Denies having a rash. Good med compliance is reported. Denies mood swings. Denies depression. Denies feelings of harm towards self or others. Reports  pain is still her primary issue.  Reports saw Dr Lincoln Lewis and was told has DDD but was at moderate level. Reports she is still  thinking about getting back into counseling at [facility]. Yuni Galeano Reports she and  are still getting along well. Kevin Ashanti reviewed drawn 5-8-2019. No critical abnormals noted. Another TSH low. Reports PCP is addressing. Noted GFR = 88 reflects mild kidney compromise. Reports PCP is aware. Reports still  being on Valium Rxed by PCP for anxiety and sleep. Client advised again Valium would not be Rxed from this office.      Past Medical Hx:  Reports allergies to Tramadol. Codeine, Percocets, Vicodin  Reports having Hypothyroidism, Migraine Headaches, HTN. DDD, Asthma, Osteoarthritis  Reports partial hysterectomy 2001     Past Psychiatric Hx:  Denies any past psych hospitalizations. Denies any past suicidal gestures. Denies ever being under care of psychiatrist. Reports receives counseling at [facility]. Currently xanax Rxed by PCP and Valium Rxed by PCP. 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 MARITAL STATUS: Currently  2nd marriage. For 24 years. Reports relationship is good. OCCUPATION: Currently on Disability. Last worked at [employer] left there 2015  [address]: Reports having associates degree in [employer]. LIVING SITUATION: [place] Has 2 grown children.   LEGAL:  Reports past 2 DUI's. Last was 1994     MSE:  Level of consciousness: Alert  Appearance: in chair and fair grooming   Behavior/Motor: no abnormalities noted   Attitude toward examiner: cooperative   Speech: Normal volume, goal directed, NRR  Mood: Euthymic  Affect: Reactive  Thought processes: Linear and goal directed   Suicidal Ideation: Denies suicidal ideations  Homicidal ideation: Denies homicidal ideations  Delusions: No evidence of delusions is observed  Perceptual Disturbance: Denies AH/VH;  No evidence of psychosis is observed. Cognition: Oriented to person, place, time and situation   Concentration fair   Memory intact   Insight: Fair  Judgment: Fair     ROS:  Constitutional: Negative for fever, chills and fatigue. HENT: Negative for ear pain, congestion, rhinorrhea and neck pain. Eyes: Negative for pain and discharge. Respiratory: Negative for cough, chest tightness and wheezing. Repots being on Advair for asthma  Cardiovascular: Negative for chest pain, palpitations and leg swelling. Gastrointestinal: Negative for nausea, vomiting and diarrhea. Genitourinary: Negative for dysuria and frequency. Musculoskeletal: Reports still having chronic back and neck  pain. Being followed by Dr Francine Cobb. Skin: Negative for rash. Neurological: Negative for dizziness, weakness. Reports has migraine headaches     Impression:  Bipolar II D/O     Plan:  Continue current meds:  Lamictal 200mg po q am    Wellbutrin XL 300mg po q am  Med education given. Hung Beck voiced understanding of education given. Encouraged again to pursue counseling.    RTC 3 mos     Review of Systems    Objective:   Physical Exam    Assessment:            Plan:              Laith Castellanos, APRN - CNP

## 2019-09-16 DIAGNOSIS — M96.1 FAILED NECK SYNDROME: ICD-10-CM

## 2019-09-16 DIAGNOSIS — F33.41 RECURRENT MAJOR DEPRESSIVE DISORDER, IN PARTIAL REMISSION (HCC): ICD-10-CM

## 2019-09-16 RX ORDER — BUTALBITAL, ACETAMINOPHEN AND CAFFEINE 50; 325; 40 MG/1; MG/1; MG/1
TABLET ORAL
Qty: 180 TABLET | Refills: 1 | Status: SHIPPED | OUTPATIENT
Start: 2019-09-16 | End: 2019-12-17 | Stop reason: SDUPTHER

## 2019-09-16 RX ORDER — DIAZEPAM 5 MG/1
5 TABLET ORAL EVERY 8 HOURS PRN
Qty: 90 TABLET | Refills: 0 | Status: SHIPPED | OUTPATIENT
Start: 2019-09-16 | End: 2020-02-14 | Stop reason: SDUPTHER

## 2019-09-16 NOTE — TELEPHONE ENCOUNTER
Kristy Posey called requesting a refill on the following medications:  Requested Prescriptions     Pending Prescriptions Disp Refills    butalbital-acetaminophen-caffeine (FIORICET, ESGIC) -40 MG per tablet 180 tablet 1     Sig: TAKE 1 TO 2 TABLETS BY MOUTH EVERY 6 HOURS AS NEEDED FOR HEADACHE    diazepam (VALIUM) 5 MG tablet 90 tablet 0     Sig: Take 1 tablet by mouth every 8 hours as needed for Anxiety or Sleep (spasms).      Pharmacy verified:  .lori Joyce    Date of last visit: 07/09/19  Date of next visit (if applicable): Visit date not found

## 2019-10-02 ENCOUNTER — TELEPHONE (OUTPATIENT)
Dept: UROLOGY | Age: 54
End: 2019-10-02

## 2019-10-15 ENCOUNTER — NURSE ONLY (OUTPATIENT)
Dept: UROLOGY | Age: 54
End: 2019-10-15
Payer: COMMERCIAL

## 2019-10-15 DIAGNOSIS — N30.10 INTERSTITIAL CYSTITIS: Primary | ICD-10-CM

## 2019-10-15 LAB
BILIRUBIN URINE: NEGATIVE
BLOOD URINE, POC: ABNORMAL
CHARACTER, URINE: CLEAR
COLOR, URINE: YELLOW
GLUCOSE URINE: NEGATIVE MG/DL
KETONES, URINE: NEGATIVE
LEUKOCYTE CLUMPS, URINE: NEGATIVE
NITRITE, URINE: NEGATIVE
PH, URINE: 8.5 (ref 5–9)
PROTEIN, URINE: ABNORMAL MG/DL
SPECIFIC GRAVITY, URINE: 1.01 (ref 1–1.03)
UROBILINOGEN, URINE: 0.2 EU/DL (ref 0–1)

## 2019-10-15 PROCEDURE — 51700 IRRIGATION OF BLADDER: CPT | Performed by: UROLOGY

## 2019-10-15 PROCEDURE — 81003 URINALYSIS AUTO W/O SCOPE: CPT | Performed by: UROLOGY

## 2019-10-15 RX ORDER — HEPARIN SODIUM 5000 [USP'U]/ML
10000 INJECTION, SOLUTION INTRAVENOUS; SUBCUTANEOUS ONCE
Status: DISCONTINUED | OUTPATIENT
Start: 2019-10-15 | End: 2019-10-15

## 2019-10-15 RX ORDER — DIMETHYL SULFOXIDE 99 %
50 LIQUID (ML) MISCELLANEOUS ONCE
Status: COMPLETED | OUTPATIENT
Start: 2019-10-15 | End: 2019-10-15

## 2019-10-15 RX ADMIN — HEPARIN SODIUM 10000 UNITS: 10000 INJECTION, SOLUTION INTRAVENOUS; SUBCUTANEOUS at 02:30

## 2019-10-15 RX ADMIN — Medication 50 ML: at 15:17

## 2019-10-16 ENCOUNTER — OFFICE VISIT (OUTPATIENT)
Dept: PHYSICAL MEDICINE AND REHAB | Age: 54
End: 2019-10-16
Payer: COMMERCIAL

## 2019-10-16 VITALS
HEIGHT: 60 IN | BODY MASS INDEX: 24.37 KG/M2 | SYSTOLIC BLOOD PRESSURE: 138 MMHG | HEART RATE: 90 BPM | WEIGHT: 124.12 LBS | DIASTOLIC BLOOD PRESSURE: 88 MMHG

## 2019-10-16 DIAGNOSIS — G44.86 CERVICOGENIC HEADACHE: Primary | ICD-10-CM

## 2019-10-16 DIAGNOSIS — G43.111 INTRACTABLE MIGRAINE WITH AURA WITH STATUS MIGRAINOSUS: ICD-10-CM

## 2019-10-16 PROCEDURE — 99204 OFFICE O/P NEW MOD 45 MIN: CPT | Performed by: PHYSICAL MEDICINE & REHABILITATION

## 2019-10-17 RX ORDER — HEPARIN SODIUM 10000 [USP'U]/ML
10000 INJECTION, SOLUTION INTRAVENOUS; SUBCUTANEOUS ONCE
Status: COMPLETED | OUTPATIENT
Start: 2019-10-17 | End: 2019-10-15

## 2019-10-30 RX ORDER — NEOMYCIN SULFATE, POLYMYXIN B SULFATE AND DEXAMETHASONE 3.5; 10000; 1 MG/ML; [USP'U]/ML; MG/ML
1 SUSPENSION/ DROPS OPHTHALMIC
COMMUNITY
End: 2021-03-16 | Stop reason: ALTCHOICE

## 2019-11-01 ENCOUNTER — OFFICE VISIT (OUTPATIENT)
Dept: UROLOGY | Age: 54
End: 2019-11-01
Payer: COMMERCIAL

## 2019-11-01 VITALS
HEIGHT: 60 IN | DIASTOLIC BLOOD PRESSURE: 80 MMHG | SYSTOLIC BLOOD PRESSURE: 134 MMHG | WEIGHT: 128 LBS | BODY MASS INDEX: 25.13 KG/M2

## 2019-11-01 DIAGNOSIS — N39.41 URGE INCONTINENCE: ICD-10-CM

## 2019-11-01 DIAGNOSIS — N30.10 INTERSTITIAL CYSTITIS: Primary | ICD-10-CM

## 2019-11-01 LAB
BILIRUBIN URINE: NEGATIVE
BLOOD URINE, POC: ABNORMAL
CHARACTER, URINE: CLEAR
COLOR, URINE: YELLOW
GLUCOSE URINE: NEGATIVE MG/DL
KETONES, URINE: NEGATIVE
LEUKOCYTE CLUMPS, URINE: ABNORMAL
NITRITE, URINE: NEGATIVE
PH, URINE: 6 (ref 5–9)
POST VOID RESIDUAL (PVR): 0 ML
PROTEIN, URINE: NEGATIVE MG/DL
SPECIFIC GRAVITY, URINE: 1.02 (ref 1–1.03)
UROBILINOGEN, URINE: 0.2 EU/DL (ref 0–1)

## 2019-11-01 PROCEDURE — 51798 US URINE CAPACITY MEASURE: CPT | Performed by: UROLOGY

## 2019-11-01 PROCEDURE — 99213 OFFICE O/P EST LOW 20 MIN: CPT | Performed by: UROLOGY

## 2019-11-01 PROCEDURE — 81003 URINALYSIS AUTO W/O SCOPE: CPT | Performed by: UROLOGY

## 2019-11-06 ENCOUNTER — HOSPITAL ENCOUNTER (OUTPATIENT)
Age: 54
Setting detail: OUTPATIENT SURGERY
Discharge: HOME | End: 2019-11-06
Attending: PHYSICAL MEDICINE & REHABILITATION | Admitting: PHYSICAL MEDICINE & REHABILITATION
Payer: COMMERCIAL

## 2019-11-06 ENCOUNTER — APPOINTMENT (OUTPATIENT)
Dept: GENERAL RADIOLOGY | Age: 54
End: 2019-11-06
Attending: PHYSICAL MEDICINE & REHABILITATION
Payer: COMMERCIAL

## 2019-11-06 VITALS
TEMPERATURE: 96.6 F | WEIGHT: 128 LBS | SYSTOLIC BLOOD PRESSURE: 116 MMHG | DIASTOLIC BLOOD PRESSURE: 57 MMHG | BODY MASS INDEX: 25.13 KG/M2 | OXYGEN SATURATION: 96 % | HEART RATE: 58 BPM | HEIGHT: 60 IN | RESPIRATION RATE: 16 BRPM

## 2019-11-06 PROCEDURE — 7100000010 HC PHASE II RECOVERY - FIRST 15 MIN: Performed by: PHYSICAL MEDICINE & REHABILITATION

## 2019-11-06 PROCEDURE — 2500000003 HC RX 250 WO HCPCS: Performed by: PHYSICAL MEDICINE & REHABILITATION

## 2019-11-06 PROCEDURE — 3600000053 HC PAIN LEVEL 2 ADDL 15 MIN: Performed by: PHYSICAL MEDICINE & REHABILITATION

## 2019-11-06 PROCEDURE — 7100000011 HC PHASE II RECOVERY - ADDTL 15 MIN: Performed by: PHYSICAL MEDICINE & REHABILITATION

## 2019-11-06 PROCEDURE — 99152 MOD SED SAME PHYS/QHP 5/>YRS: CPT | Performed by: PHYSICAL MEDICINE & REHABILITATION

## 2019-11-06 PROCEDURE — 3600000052 HC PAIN LEVEL 2 BASE: Performed by: PHYSICAL MEDICINE & REHABILITATION

## 2019-11-06 PROCEDURE — 3209999900 FLUORO FOR SURGICAL PROCEDURES

## 2019-11-06 PROCEDURE — 64405 NJX AA&/STRD GR OCPL NRV: CPT | Performed by: PHYSICAL MEDICINE & REHABILITATION

## 2019-11-06 PROCEDURE — 6360000002 HC RX W HCPCS: Performed by: PHYSICAL MEDICINE & REHABILITATION

## 2019-11-06 PROCEDURE — 2709999900 HC NON-CHARGEABLE SUPPLY: Performed by: PHYSICAL MEDICINE & REHABILITATION

## 2019-11-06 RX ORDER — LIDOCAINE HYDROCHLORIDE 10 MG/ML
INJECTION, SOLUTION INFILTRATION; PERINEURAL PRN
Status: DISCONTINUED | OUTPATIENT
Start: 2019-11-06 | End: 2019-11-06 | Stop reason: ALTCHOICE

## 2019-11-06 RX ORDER — MIDAZOLAM HYDROCHLORIDE 1 MG/ML
INJECTION INTRAMUSCULAR; INTRAVENOUS PRN
Status: DISCONTINUED | OUTPATIENT
Start: 2019-11-06 | End: 2019-11-06 | Stop reason: ALTCHOICE

## 2019-11-06 RX ORDER — TRIAMCINOLONE ACETONIDE 40 MG/ML
INJECTION, SUSPENSION INTRA-ARTICULAR; INTRAMUSCULAR PRN
Status: DISCONTINUED | OUTPATIENT
Start: 2019-11-06 | End: 2019-11-06 | Stop reason: ALTCHOICE

## 2019-11-06 RX ORDER — FENTANYL CITRATE 50 UG/ML
INJECTION, SOLUTION INTRAMUSCULAR; INTRAVENOUS PRN
Status: DISCONTINUED | OUTPATIENT
Start: 2019-11-06 | End: 2019-11-06 | Stop reason: ALTCHOICE

## 2019-11-06 RX ORDER — BUPIVACAINE HYDROCHLORIDE 5 MG/ML
INJECTION, SOLUTION EPIDURAL; INTRACAUDAL PRN
Status: DISCONTINUED | OUTPATIENT
Start: 2019-11-06 | End: 2019-11-06 | Stop reason: ALTCHOICE

## 2019-11-06 ASSESSMENT — PAIN DESCRIPTION - DESCRIPTORS: DESCRIPTORS: ACHING

## 2019-11-06 ASSESSMENT — PAIN SCALES - GENERAL: PAINLEVEL_OUTOF10: 0

## 2019-11-06 ASSESSMENT — PAIN - FUNCTIONAL ASSESSMENT: PAIN_FUNCTIONAL_ASSESSMENT: 0-10

## 2019-11-08 ENCOUNTER — TELEPHONE (OUTPATIENT)
Dept: UROLOGY | Age: 54
End: 2019-11-08

## 2019-11-12 ENCOUNTER — OFFICE VISIT (OUTPATIENT)
Dept: FAMILY MEDICINE CLINIC | Age: 54
End: 2019-11-12
Payer: COMMERCIAL

## 2019-11-12 VITALS
SYSTOLIC BLOOD PRESSURE: 136 MMHG | BODY MASS INDEX: 24.92 KG/M2 | RESPIRATION RATE: 16 BRPM | HEART RATE: 72 BPM | WEIGHT: 127.6 LBS | DIASTOLIC BLOOD PRESSURE: 76 MMHG | TEMPERATURE: 98.6 F

## 2019-11-12 DIAGNOSIS — M54.2 CHRONIC NECK PAIN: ICD-10-CM

## 2019-11-12 DIAGNOSIS — G89.29 CHRONIC NECK PAIN: ICD-10-CM

## 2019-11-12 DIAGNOSIS — K59.00 CONSTIPATION, UNSPECIFIED CONSTIPATION TYPE: ICD-10-CM

## 2019-11-12 DIAGNOSIS — R32 URINARY INCONTINENCE, UNSPECIFIED TYPE: ICD-10-CM

## 2019-11-12 DIAGNOSIS — R31.9 HEMATURIA, UNSPECIFIED TYPE: ICD-10-CM

## 2019-11-12 DIAGNOSIS — E03.9 HYPOTHYROIDISM, UNSPECIFIED TYPE: Primary | ICD-10-CM

## 2019-11-12 DIAGNOSIS — F33.41 RECURRENT MAJOR DEPRESSIVE DISORDER, IN PARTIAL REMISSION (HCC): ICD-10-CM

## 2019-11-12 DIAGNOSIS — M96.1 FAILED NECK SYNDROME: ICD-10-CM

## 2019-11-12 DIAGNOSIS — N30.10 IC (INTERSTITIAL CYSTITIS): ICD-10-CM

## 2019-11-12 DIAGNOSIS — G44.86 CERVICOGENIC HEADACHE: ICD-10-CM

## 2019-11-12 DIAGNOSIS — Z98.1 HISTORY OF FUSION OF CERVICAL SPINE: ICD-10-CM

## 2019-11-12 DIAGNOSIS — J01.90 ACUTE SINUSITIS, RECURRENCE NOT SPECIFIED, UNSPECIFIED LOCATION: ICD-10-CM

## 2019-11-12 PROCEDURE — 99214 OFFICE O/P EST MOD 30 MIN: CPT | Performed by: NURSE PRACTITIONER

## 2019-11-12 RX ORDER — ALBUTEROL SULFATE 90 UG/1
2 AEROSOL, METERED RESPIRATORY (INHALATION) EVERY 6 HOURS PRN
Qty: 1 INHALER | Refills: 5 | Status: SHIPPED | OUTPATIENT
Start: 2019-11-12 | End: 2020-04-23 | Stop reason: SDUPTHER

## 2019-11-12 RX ORDER — PREDNISONE 1 MG/1
TABLET ORAL
Qty: 30 TABLET | Refills: 0 | Status: SHIPPED | OUTPATIENT
Start: 2019-11-12 | End: 2019-11-22

## 2019-11-12 RX ORDER — DIAZEPAM 5 MG/1
5 TABLET ORAL EVERY 8 HOURS PRN
Qty: 90 TABLET | Refills: 0 | Status: SHIPPED | OUTPATIENT
Start: 2019-11-12 | End: 2019-11-14

## 2019-11-12 RX ORDER — CIPROFLOXACIN 500 MG/1
500 TABLET, FILM COATED ORAL 2 TIMES DAILY
Qty: 20 TABLET | Refills: 0 | Status: SHIPPED | OUTPATIENT
Start: 2019-11-12 | End: 2019-11-22

## 2019-11-13 ENCOUNTER — TELEPHONE (OUTPATIENT)
Dept: UROLOGY | Age: 54
End: 2019-11-13

## 2019-11-13 ASSESSMENT — ENCOUNTER SYMPTOMS
COUGH: 1
GASTROINTESTINAL NEGATIVE: 1
SINUS PAIN: 1
SINUS PRESSURE: 1
BACK PAIN: 1

## 2019-11-14 ENCOUNTER — PREP FOR PROCEDURE (OUTPATIENT)
Dept: UROLOGY | Age: 54
End: 2019-11-14

## 2019-11-14 RX ORDER — SODIUM CHLORIDE 9 MG/ML
INJECTION, SOLUTION INTRAVENOUS CONTINUOUS
Status: CANCELLED | OUTPATIENT
Start: 2019-11-20

## 2019-11-14 RX ORDER — CIPROFLOXACIN 2 MG/ML
400 INJECTION, SOLUTION INTRAVENOUS
Status: CANCELLED | OUTPATIENT
Start: 2019-11-20

## 2019-11-15 ENCOUNTER — TELEPHONE (OUTPATIENT)
Dept: UROLOGY | Age: 54
End: 2019-11-15

## 2019-11-18 ENCOUNTER — TELEPHONE (OUTPATIENT)
Dept: UROLOGY | Age: 54
End: 2019-11-18

## 2019-11-19 ENCOUNTER — PREP FOR PROCEDURE (OUTPATIENT)
Dept: UROLOGY | Age: 54
End: 2019-11-19

## 2019-11-19 RX ORDER — SODIUM CHLORIDE 9 MG/ML
INJECTION, SOLUTION INTRAVENOUS CONTINUOUS
Status: CANCELLED | OUTPATIENT
Start: 2019-12-04

## 2019-11-19 RX ORDER — CIPROFLOXACIN 2 MG/ML
400 INJECTION, SOLUTION INTRAVENOUS
Status: CANCELLED | OUTPATIENT
Start: 2019-12-04

## 2019-11-20 ENCOUNTER — HOSPITAL ENCOUNTER (OUTPATIENT)
Age: 54
Setting detail: OUTPATIENT SURGERY
Discharge: HOME | End: 2019-11-20
Attending: UROLOGY | Admitting: UROLOGY
Payer: COMMERCIAL

## 2019-11-20 ENCOUNTER — APPOINTMENT (OUTPATIENT)
Dept: GENERAL RADIOLOGY | Age: 54
End: 2019-11-20
Attending: UROLOGY
Payer: COMMERCIAL

## 2019-11-20 ENCOUNTER — ANESTHESIA EVENT (OUTPATIENT)
Dept: OPERATING ROOM | Age: 54
End: 2019-11-20
Payer: COMMERCIAL

## 2019-11-20 ENCOUNTER — ANESTHESIA (OUTPATIENT)
Dept: OPERATING ROOM | Age: 54
End: 2019-11-20
Payer: COMMERCIAL

## 2019-11-20 VITALS
RESPIRATION RATE: 16 BRPM | BODY MASS INDEX: 24.74 KG/M2 | DIASTOLIC BLOOD PRESSURE: 64 MMHG | SYSTOLIC BLOOD PRESSURE: 137 MMHG | OXYGEN SATURATION: 97 % | WEIGHT: 126 LBS | HEART RATE: 70 BPM | HEIGHT: 60 IN | TEMPERATURE: 98 F

## 2019-11-20 VITALS
SYSTOLIC BLOOD PRESSURE: 112 MMHG | RESPIRATION RATE: 10 BRPM | DIASTOLIC BLOOD PRESSURE: 57 MMHG | OXYGEN SATURATION: 100 %

## 2019-11-20 LAB
ANION GAP SERPL CALCULATED.3IONS-SCNC: 11 MEQ/L (ref 8–16)
BASOPHILS # BLD: 0.7 %
BASOPHILS ABSOLUTE: 0.1 THOU/MM3 (ref 0–0.1)
BUN BLDV-MCNC: 12 MG/DL (ref 7–22)
CALCIUM SERPL-MCNC: 9.4 MG/DL (ref 8.5–10.5)
CHLORIDE BLD-SCNC: 104 MEQ/L (ref 98–111)
CO2: 26 MEQ/L (ref 23–33)
CREAT SERPL-MCNC: 0.5 MG/DL (ref 0.4–1.2)
EOSINOPHIL # BLD: 2.2 %
EOSINOPHILS ABSOLUTE: 0.2 THOU/MM3 (ref 0–0.4)
ERYTHROCYTE [DISTWIDTH] IN BLOOD BY AUTOMATED COUNT: 14.3 % (ref 11.5–14.5)
ERYTHROCYTE [DISTWIDTH] IN BLOOD BY AUTOMATED COUNT: 49.8 FL (ref 35–45)
GFR SERPL CREATININE-BSD FRML MDRD: > 90 ML/MIN/1.73M2
GLUCOSE BLD-MCNC: 93 MG/DL (ref 70–108)
HCT VFR BLD CALC: 40.4 % (ref 37–47)
HEMOGLOBIN: 12.9 GM/DL (ref 12–16)
IMMATURE GRANS (ABS): 0.03 THOU/MM3 (ref 0–0.07)
IMMATURE GRANULOCYTES: 0.4 %
LYMPHOCYTES # BLD: 23.1 %
LYMPHOCYTES ABSOLUTE: 1.7 THOU/MM3 (ref 1–4.8)
MCH RBC QN AUTO: 30.4 PG (ref 26–33)
MCHC RBC AUTO-ENTMCNC: 31.9 GM/DL (ref 32.2–35.5)
MCV RBC AUTO: 95.1 FL (ref 81–99)
MONOCYTES # BLD: 8 %
MONOCYTES ABSOLUTE: 0.6 THOU/MM3 (ref 0.4–1.3)
NUCLEATED RED BLOOD CELLS: 0 /100 WBC
PLATELET # BLD: 281 THOU/MM3 (ref 130–400)
PMV BLD AUTO: 10.3 FL (ref 9.4–12.4)
POTASSIUM SERPL-SCNC: 3.7 MEQ/L (ref 3.5–5.2)
RBC # BLD: 4.25 MILL/MM3 (ref 4.2–5.4)
SEG NEUTROPHILS: 65.6 %
SEGMENTED NEUTROPHILS ABSOLUTE COUNT: 4.9 THOU/MM3 (ref 1.8–7.7)
SODIUM BLD-SCNC: 141 MEQ/L (ref 135–145)
WBC # BLD: 7.4 THOU/MM3 (ref 4.8–10.8)

## 2019-11-20 PROCEDURE — 3600000012 HC SURGERY LEVEL 2 ADDTL 15MIN: Performed by: UROLOGY

## 2019-11-20 PROCEDURE — 2580000003 HC RX 258

## 2019-11-20 PROCEDURE — 72220 X-RAY EXAM SACRUM TAILBONE: CPT

## 2019-11-20 PROCEDURE — 7100000010 HC PHASE II RECOVERY - FIRST 15 MIN: Performed by: UROLOGY

## 2019-11-20 PROCEDURE — 7100000001 HC PACU RECOVERY - ADDTL 15 MIN: Performed by: UROLOGY

## 2019-11-20 PROCEDURE — 3700000001 HC ADD 15 MINUTES (ANESTHESIA): Performed by: UROLOGY

## 2019-11-20 PROCEDURE — 3209999900 FLUORO FOR SURGICAL PROCEDURES

## 2019-11-20 PROCEDURE — 2709999900 HC NON-CHARGEABLE SUPPLY: Performed by: UROLOGY

## 2019-11-20 PROCEDURE — 6360000002 HC RX W HCPCS

## 2019-11-20 PROCEDURE — 85025 COMPLETE CBC W/AUTO DIFF WBC: CPT

## 2019-11-20 PROCEDURE — 36415 COLL VENOUS BLD VENIPUNCTURE: CPT

## 2019-11-20 PROCEDURE — 76000 FLUOROSCOPY <1 HR PHYS/QHP: CPT | Performed by: UROLOGY

## 2019-11-20 PROCEDURE — 3600000002 HC SURGERY LEVEL 2 BASE: Performed by: UROLOGY

## 2019-11-20 PROCEDURE — 7100000000 HC PACU RECOVERY - FIRST 15 MIN: Performed by: UROLOGY

## 2019-11-20 PROCEDURE — 3700000000 HC ANESTHESIA ATTENDED CARE: Performed by: UROLOGY

## 2019-11-20 PROCEDURE — 6360000002 HC RX W HCPCS: Performed by: NURSE ANESTHETIST, CERTIFIED REGISTERED

## 2019-11-20 PROCEDURE — 7100000011 HC PHASE II RECOVERY - ADDTL 15 MIN: Performed by: UROLOGY

## 2019-11-20 PROCEDURE — 80048 BASIC METABOLIC PNL TOTAL CA: CPT

## 2019-11-20 PROCEDURE — 64581 OPN IMPLTJ NEA SACRAL NERVE: CPT | Performed by: UROLOGY

## 2019-11-20 PROCEDURE — 2500000003 HC RX 250 WO HCPCS: Performed by: UROLOGY

## 2019-11-20 PROCEDURE — 2500000003 HC RX 250 WO HCPCS: Performed by: NURSE ANESTHETIST, CERTIFIED REGISTERED

## 2019-11-20 PROCEDURE — C1883 ADAPT/EXT, PACING/NEURO LEAD: HCPCS | Performed by: UROLOGY

## 2019-11-20 PROCEDURE — C1894 INTRO/SHEATH, NON-LASER: HCPCS | Performed by: UROLOGY

## 2019-11-20 PROCEDURE — 6360000002 HC RX W HCPCS: Performed by: ANESTHESIOLOGY

## 2019-11-20 DEVICE — KIT NEUROSTIMULATOR LD L28CM DIA1.27MM ELECTRD SPC 1.5MM: Type: IMPLANTABLE DEVICE | Site: BACK | Status: FUNCTIONAL

## 2019-11-20 RX ORDER — ONDANSETRON 2 MG/ML
INJECTION INTRAMUSCULAR; INTRAVENOUS PRN
Status: DISCONTINUED | OUTPATIENT
Start: 2019-11-20 | End: 2019-11-20 | Stop reason: SDUPTHER

## 2019-11-20 RX ORDER — MEPERIDINE HYDROCHLORIDE 25 MG/ML
12.5 INJECTION INTRAMUSCULAR; INTRAVENOUS; SUBCUTANEOUS EVERY 5 MIN PRN
Status: DISCONTINUED | OUTPATIENT
Start: 2019-11-20 | End: 2019-11-20 | Stop reason: HOSPADM

## 2019-11-20 RX ORDER — PROPOFOL 10 MG/ML
INJECTION, EMULSION INTRAVENOUS PRN
Status: DISCONTINUED | OUTPATIENT
Start: 2019-11-20 | End: 2019-11-20 | Stop reason: SDUPTHER

## 2019-11-20 RX ORDER — FENTANYL CITRATE 50 UG/ML
50 INJECTION, SOLUTION INTRAMUSCULAR; INTRAVENOUS EVERY 5 MIN PRN
Status: DISCONTINUED | OUTPATIENT
Start: 2019-11-20 | End: 2019-11-20 | Stop reason: HOSPADM

## 2019-11-20 RX ORDER — FENTANYL CITRATE 50 UG/ML
INJECTION, SOLUTION INTRAMUSCULAR; INTRAVENOUS PRN
Status: DISCONTINUED | OUTPATIENT
Start: 2019-11-20 | End: 2019-11-20 | Stop reason: SDUPTHER

## 2019-11-20 RX ORDER — ONDANSETRON 2 MG/ML
4 INJECTION INTRAMUSCULAR; INTRAVENOUS
Status: COMPLETED | OUTPATIENT
Start: 2019-11-20 | End: 2019-11-20

## 2019-11-20 RX ORDER — SUCCINYLCHOLINE/SOD CL,ISO/PF 200MG/10ML
SYRINGE (ML) INTRAVENOUS PRN
Status: DISCONTINUED | OUTPATIENT
Start: 2019-11-20 | End: 2019-11-20 | Stop reason: SDUPTHER

## 2019-11-20 RX ORDER — MIDAZOLAM HYDROCHLORIDE 1 MG/ML
INJECTION INTRAMUSCULAR; INTRAVENOUS PRN
Status: DISCONTINUED | OUTPATIENT
Start: 2019-11-20 | End: 2019-11-20 | Stop reason: SDUPTHER

## 2019-11-20 RX ORDER — DEXAMETHASONE SODIUM PHOSPHATE 4 MG/ML
INJECTION, SOLUTION INTRA-ARTICULAR; INTRALESIONAL; INTRAMUSCULAR; INTRAVENOUS; SOFT TISSUE PRN
Status: DISCONTINUED | OUTPATIENT
Start: 2019-11-20 | End: 2019-11-20 | Stop reason: SDUPTHER

## 2019-11-20 RX ORDER — BUPIVACAINE HYDROCHLORIDE 5 MG/ML
INJECTION, SOLUTION EPIDURAL; INTRACAUDAL PRN
Status: DISCONTINUED | OUTPATIENT
Start: 2019-11-20 | End: 2019-11-20 | Stop reason: ALTCHOICE

## 2019-11-20 RX ORDER — LABETALOL 20 MG/4 ML (5 MG/ML) INTRAVENOUS SYRINGE
5 EVERY 10 MIN PRN
Status: DISCONTINUED | OUTPATIENT
Start: 2019-11-20 | End: 2019-11-20 | Stop reason: HOSPADM

## 2019-11-20 RX ORDER — SODIUM CHLORIDE 9 MG/ML
INJECTION, SOLUTION INTRAVENOUS CONTINUOUS
Status: DISCONTINUED | OUTPATIENT
Start: 2019-11-20 | End: 2019-11-20 | Stop reason: HOSPADM

## 2019-11-20 RX ORDER — CIPROFLOXACIN 2 MG/ML
400 INJECTION, SOLUTION INTRAVENOUS
Status: COMPLETED | OUTPATIENT
Start: 2019-11-20 | End: 2019-11-20

## 2019-11-20 RX ORDER — LIDOCAINE HCL/PF 100 MG/5ML
SYRINGE (ML) INJECTION PRN
Status: DISCONTINUED | OUTPATIENT
Start: 2019-11-20 | End: 2019-11-20 | Stop reason: SDUPTHER

## 2019-11-20 RX ADMIN — FENTANYL CITRATE 50 MCG: 50 INJECTION, SOLUTION INTRAMUSCULAR; INTRAVENOUS at 15:55

## 2019-11-20 RX ADMIN — PROPOFOL 150 MG: 10 INJECTION, EMULSION INTRAVENOUS at 14:10

## 2019-11-20 RX ADMIN — FENTANYL CITRATE 100 MCG: 50 INJECTION INTRAMUSCULAR; INTRAVENOUS at 14:07

## 2019-11-20 RX ADMIN — Medication 120 MG: at 14:10

## 2019-11-20 RX ADMIN — FENTANYL CITRATE 50 MCG: 50 INJECTION, SOLUTION INTRAMUSCULAR; INTRAVENOUS at 16:00

## 2019-11-20 RX ADMIN — SODIUM CHLORIDE: 9 INJECTION, SOLUTION INTRAVENOUS at 13:53

## 2019-11-20 RX ADMIN — DEXAMETHASONE SODIUM PHOSPHATE 8 MG: 4 INJECTION, SOLUTION INTRAMUSCULAR; INTRAVENOUS at 14:19

## 2019-11-20 RX ADMIN — ONDANSETRON HYDROCHLORIDE 4 MG: 4 INJECTION, SOLUTION INTRAMUSCULAR; INTRAVENOUS at 14:19

## 2019-11-20 RX ADMIN — ONDANSETRON 4 MG: 2 INJECTION INTRAMUSCULAR; INTRAVENOUS at 15:50

## 2019-11-20 RX ADMIN — Medication 100 MG: at 14:10

## 2019-11-20 RX ADMIN — MIDAZOLAM HYDROCHLORIDE 2 MG: 1 INJECTION, SOLUTION INTRAMUSCULAR; INTRAVENOUS at 14:07

## 2019-11-20 RX ADMIN — CIPROFLOXACIN 400 MG: 2 INJECTION, SOLUTION INTRAVENOUS at 14:11

## 2019-11-20 ASSESSMENT — PULMONARY FUNCTION TESTS
PIF_VALUE: 7
PIF_VALUE: 18
PIF_VALUE: 17
PIF_VALUE: 17
PIF_VALUE: 18
PIF_VALUE: 17
PIF_VALUE: 18
PIF_VALUE: 17
PIF_VALUE: 17
PIF_VALUE: 16
PIF_VALUE: 18
PIF_VALUE: 18
PIF_VALUE: 16
PIF_VALUE: 17
PIF_VALUE: 16
PIF_VALUE: 17
PIF_VALUE: 17
PIF_VALUE: 1
PIF_VALUE: 17
PIF_VALUE: 19
PIF_VALUE: 16
PIF_VALUE: 17
PIF_VALUE: 18
PIF_VALUE: 17
PIF_VALUE: 18
PIF_VALUE: 18
PIF_VALUE: 17
PIF_VALUE: 24
PIF_VALUE: 18
PIF_VALUE: 18
PIF_VALUE: 17
PIF_VALUE: 18
PIF_VALUE: 17
PIF_VALUE: 17
PIF_VALUE: 4
PIF_VALUE: 17
PIF_VALUE: 16
PIF_VALUE: 18
PIF_VALUE: 18
PIF_VALUE: 15
PIF_VALUE: 18
PIF_VALUE: 17
PIF_VALUE: 16
PIF_VALUE: 15
PIF_VALUE: 17
PIF_VALUE: 17
PIF_VALUE: 1
PIF_VALUE: 1
PIF_VALUE: 0
PIF_VALUE: 23
PIF_VALUE: 14
PIF_VALUE: 17
PIF_VALUE: 15
PIF_VALUE: 3
PIF_VALUE: 19
PIF_VALUE: 3
PIF_VALUE: 18
PIF_VALUE: 18
PIF_VALUE: 17
PIF_VALUE: 17
PIF_VALUE: 16

## 2019-11-20 ASSESSMENT — PAIN DESCRIPTION - PAIN TYPE
TYPE: SURGICAL PAIN
TYPE: SURGICAL PAIN

## 2019-11-20 ASSESSMENT — PAIN DESCRIPTION - ORIENTATION
ORIENTATION: RIGHT
ORIENTATION: RIGHT

## 2019-11-20 ASSESSMENT — PAIN SCALES - GENERAL
PAINLEVEL_OUTOF10: 9
PAINLEVEL_OUTOF10: 4
PAINLEVEL_OUTOF10: 4
PAINLEVEL_OUTOF10: 7
PAINLEVEL_OUTOF10: 8

## 2019-11-20 ASSESSMENT — PAIN DESCRIPTION - DESCRIPTORS
DESCRIPTORS: TENDER
DESCRIPTORS: ACHING;BURNING

## 2019-11-20 ASSESSMENT — PAIN - FUNCTIONAL ASSESSMENT: PAIN_FUNCTIONAL_ASSESSMENT: 0-10

## 2019-11-20 ASSESSMENT — PAIN DESCRIPTION - LOCATION
LOCATION: BACK
LOCATION: BACK

## 2019-11-20 ASSESSMENT — ENCOUNTER SYMPTOMS: SHORTNESS OF BREATH: 1

## 2019-11-21 ENCOUNTER — TELEPHONE (OUTPATIENT)
Dept: UROLOGY | Age: 54
End: 2019-11-21

## 2019-12-02 ENCOUNTER — TELEPHONE (OUTPATIENT)
Dept: UROLOGY | Age: 54
End: 2019-12-02

## 2019-12-04 ENCOUNTER — ANESTHESIA EVENT (OUTPATIENT)
Dept: OPERATING ROOM | Age: 54
End: 2019-12-04
Payer: MEDICARE

## 2019-12-04 ENCOUNTER — HOSPITAL ENCOUNTER (OUTPATIENT)
Age: 54
Setting detail: OUTPATIENT SURGERY
Discharge: HOME | End: 2019-12-04
Attending: UROLOGY | Admitting: UROLOGY
Payer: MEDICARE

## 2019-12-04 ENCOUNTER — ANESTHESIA (OUTPATIENT)
Dept: OPERATING ROOM | Age: 54
End: 2019-12-04
Payer: MEDICARE

## 2019-12-04 VITALS
BODY MASS INDEX: 24.85 KG/M2 | WEIGHT: 126.6 LBS | HEIGHT: 60 IN | TEMPERATURE: 97 F | HEART RATE: 74 BPM | RESPIRATION RATE: 14 BRPM | DIASTOLIC BLOOD PRESSURE: 80 MMHG | SYSTOLIC BLOOD PRESSURE: 128 MMHG | OXYGEN SATURATION: 99 %

## 2019-12-04 VITALS
TEMPERATURE: 95.5 F | SYSTOLIC BLOOD PRESSURE: 177 MMHG | OXYGEN SATURATION: 100 % | DIASTOLIC BLOOD PRESSURE: 84 MMHG | RESPIRATION RATE: 3 BRPM

## 2019-12-04 PROCEDURE — 6360000002 HC RX W HCPCS

## 2019-12-04 PROCEDURE — 7100000000 HC PACU RECOVERY - FIRST 15 MIN: Performed by: UROLOGY

## 2019-12-04 PROCEDURE — 2500000003 HC RX 250 WO HCPCS: Performed by: UROLOGY

## 2019-12-04 PROCEDURE — 3600000003 HC SURGERY LEVEL 3 BASE: Performed by: UROLOGY

## 2019-12-04 PROCEDURE — 2500000003 HC RX 250 WO HCPCS: Performed by: NURSE ANESTHETIST, CERTIFIED REGISTERED

## 2019-12-04 PROCEDURE — 7100000011 HC PHASE II RECOVERY - ADDTL 15 MIN: Performed by: UROLOGY

## 2019-12-04 PROCEDURE — 2709999900 HC NON-CHARGEABLE SUPPLY: Performed by: UROLOGY

## 2019-12-04 PROCEDURE — 7100000001 HC PACU RECOVERY - ADDTL 15 MIN: Performed by: UROLOGY

## 2019-12-04 PROCEDURE — 6360000002 HC RX W HCPCS: Performed by: ANESTHESIOLOGY

## 2019-12-04 PROCEDURE — 3700000000 HC ANESTHESIA ATTENDED CARE: Performed by: UROLOGY

## 2019-12-04 PROCEDURE — 7100000010 HC PHASE II RECOVERY - FIRST 15 MIN: Performed by: UROLOGY

## 2019-12-04 PROCEDURE — 3600000013 HC SURGERY LEVEL 3 ADDTL 15MIN: Performed by: UROLOGY

## 2019-12-04 PROCEDURE — 64581 OPN IMPLTJ NEA SACRAL NERVE: CPT | Performed by: UROLOGY

## 2019-12-04 PROCEDURE — C1767 GENERATOR, NEURO NON-RECHARG: HCPCS | Performed by: UROLOGY

## 2019-12-04 PROCEDURE — 2580000003 HC RX 258

## 2019-12-04 PROCEDURE — 3700000001 HC ADD 15 MINUTES (ANESTHESIA): Performed by: UROLOGY

## 2019-12-04 PROCEDURE — 6360000002 HC RX W HCPCS: Performed by: NURSE ANESTHETIST, CERTIFIED REGISTERED

## 2019-12-04 DEVICE — Z DUP USE 2628873 GENERATOR NEUROSTIMULATOR H1.7XL2IN THK3IN TORQ WRNCH PROD: Type: IMPLANTABLE DEVICE | Site: BACK | Status: FUNCTIONAL

## 2019-12-04 RX ORDER — ROCURONIUM BROMIDE 10 MG/ML
INJECTION, SOLUTION INTRAVENOUS PRN
Status: DISCONTINUED | OUTPATIENT
Start: 2019-12-04 | End: 2019-12-04 | Stop reason: SDUPTHER

## 2019-12-04 RX ORDER — ONDANSETRON 2 MG/ML
INJECTION INTRAMUSCULAR; INTRAVENOUS
Status: COMPLETED
Start: 2019-12-04 | End: 2019-12-04

## 2019-12-04 RX ORDER — ONDANSETRON 2 MG/ML
INJECTION INTRAMUSCULAR; INTRAVENOUS PRN
Status: DISCONTINUED | OUTPATIENT
Start: 2019-12-04 | End: 2019-12-04 | Stop reason: SDUPTHER

## 2019-12-04 RX ORDER — MIDAZOLAM HYDROCHLORIDE 1 MG/ML
INJECTION INTRAMUSCULAR; INTRAVENOUS PRN
Status: DISCONTINUED | OUTPATIENT
Start: 2019-12-04 | End: 2019-12-04 | Stop reason: SDUPTHER

## 2019-12-04 RX ORDER — CIPROFLOXACIN 2 MG/ML
400 INJECTION, SOLUTION INTRAVENOUS
Status: COMPLETED | OUTPATIENT
Start: 2019-12-04 | End: 2019-12-04

## 2019-12-04 RX ORDER — FENTANYL CITRATE 50 UG/ML
50 INJECTION, SOLUTION INTRAMUSCULAR; INTRAVENOUS ONCE
Status: COMPLETED | OUTPATIENT
Start: 2019-12-04 | End: 2019-12-04

## 2019-12-04 RX ORDER — FENTANYL CITRATE 50 UG/ML
INJECTION, SOLUTION INTRAMUSCULAR; INTRAVENOUS
Status: COMPLETED
Start: 2019-12-04 | End: 2019-12-04

## 2019-12-04 RX ORDER — SODIUM CHLORIDE 9 MG/ML
INJECTION, SOLUTION INTRAVENOUS CONTINUOUS
Status: DISCONTINUED | OUTPATIENT
Start: 2019-12-04 | End: 2019-12-04 | Stop reason: HOSPADM

## 2019-12-04 RX ORDER — LIDOCAINE HCL/PF 100 MG/5ML
SYRINGE (ML) INJECTION PRN
Status: DISCONTINUED | OUTPATIENT
Start: 2019-12-04 | End: 2019-12-04 | Stop reason: SDUPTHER

## 2019-12-04 RX ORDER — ONDANSETRON 2 MG/ML
4 INJECTION INTRAMUSCULAR; INTRAVENOUS ONCE
Status: COMPLETED | OUTPATIENT
Start: 2019-12-04 | End: 2019-12-04

## 2019-12-04 RX ORDER — PROPOFOL 10 MG/ML
INJECTION, EMULSION INTRAVENOUS PRN
Status: DISCONTINUED | OUTPATIENT
Start: 2019-12-04 | End: 2019-12-04 | Stop reason: SDUPTHER

## 2019-12-04 RX ORDER — DEXAMETHASONE SODIUM PHOSPHATE 4 MG/ML
INJECTION, SOLUTION INTRA-ARTICULAR; INTRALESIONAL; INTRAMUSCULAR; INTRAVENOUS; SOFT TISSUE PRN
Status: DISCONTINUED | OUTPATIENT
Start: 2019-12-04 | End: 2019-12-04 | Stop reason: SDUPTHER

## 2019-12-04 RX ORDER — FENTANYL CITRATE 50 UG/ML
INJECTION, SOLUTION INTRAMUSCULAR; INTRAVENOUS PRN
Status: DISCONTINUED | OUTPATIENT
Start: 2019-12-04 | End: 2019-12-04 | Stop reason: SDUPTHER

## 2019-12-04 RX ORDER — BUPIVACAINE HYDROCHLORIDE 5 MG/ML
INJECTION, SOLUTION EPIDURAL; INTRACAUDAL PRN
Status: DISCONTINUED | OUTPATIENT
Start: 2019-12-04 | End: 2019-12-04 | Stop reason: ALTCHOICE

## 2019-12-04 RX ADMIN — SODIUM CHLORIDE: 9 INJECTION, SOLUTION INTRAVENOUS at 12:17

## 2019-12-04 RX ADMIN — DEXAMETHASONE SODIUM PHOSPHATE 8 MG: 4 INJECTION, SOLUTION INTRAMUSCULAR; INTRAVENOUS at 16:30

## 2019-12-04 RX ADMIN — FENTANYL CITRATE 50 MCG: 50 INJECTION, SOLUTION INTRAMUSCULAR; INTRAVENOUS at 15:00

## 2019-12-04 RX ADMIN — Medication 60 MG: at 16:20

## 2019-12-04 RX ADMIN — SODIUM CHLORIDE: 9 INJECTION, SOLUTION INTRAVENOUS at 16:17

## 2019-12-04 RX ADMIN — ONDANSETRON 4 MG: 2 INJECTION INTRAMUSCULAR; INTRAVENOUS at 17:20

## 2019-12-04 RX ADMIN — FENTANYL CITRATE 50 MCG: 50 INJECTION, SOLUTION INTRAMUSCULAR; INTRAVENOUS at 12:55

## 2019-12-04 RX ADMIN — ONDANSETRON 4 MG: 2 INJECTION INTRAMUSCULAR; INTRAVENOUS at 12:52

## 2019-12-04 RX ADMIN — ROCURONIUM BROMIDE 30 MG: 10 INJECTION INTRAVENOUS at 16:20

## 2019-12-04 RX ADMIN — PROPOFOL 150 MG: 10 INJECTION, EMULSION INTRAVENOUS at 16:20

## 2019-12-04 RX ADMIN — FENTANYL CITRATE 50 MCG: 50 INJECTION INTRAMUSCULAR; INTRAVENOUS at 16:32

## 2019-12-04 RX ADMIN — MIDAZOLAM HYDROCHLORIDE 2 MG: 1 INJECTION, SOLUTION INTRAMUSCULAR; INTRAVENOUS at 16:15

## 2019-12-04 RX ADMIN — CIPROFLOXACIN 400 MG: 2 INJECTION, SOLUTION INTRAVENOUS at 16:23

## 2019-12-04 RX ADMIN — SUGAMMADEX 150 MG: 100 INJECTION, SOLUTION INTRAVENOUS at 16:58

## 2019-12-04 RX ADMIN — ONDANSETRON HYDROCHLORIDE 4 MG: 4 INJECTION, SOLUTION INTRAMUSCULAR; INTRAVENOUS at 16:47

## 2019-12-04 RX ADMIN — FENTANYL CITRATE 50 MCG: 50 INJECTION INTRAMUSCULAR; INTRAVENOUS at 17:08

## 2019-12-04 ASSESSMENT — PULMONARY FUNCTION TESTS
PIF_VALUE: 7
PIF_VALUE: 16
PIF_VALUE: 16
PIF_VALUE: 15
PIF_VALUE: 16
PIF_VALUE: 15
PIF_VALUE: 17
PIF_VALUE: 20
PIF_VALUE: 16
PIF_VALUE: 15
PIF_VALUE: 0
PIF_VALUE: 15
PIF_VALUE: 16
PIF_VALUE: 1
PIF_VALUE: 59
PIF_VALUE: 16
PIF_VALUE: 24
PIF_VALUE: 15
PIF_VALUE: 16
PIF_VALUE: 15
PIF_VALUE: 19
PIF_VALUE: 16
PIF_VALUE: 16
PIF_VALUE: 15
PIF_VALUE: 1
PIF_VALUE: 16
PIF_VALUE: 16
PIF_VALUE: 14
PIF_VALUE: 16
PIF_VALUE: 1
PIF_VALUE: 22
PIF_VALUE: 24
PIF_VALUE: 15
PIF_VALUE: 16
PIF_VALUE: 61
PIF_VALUE: 16
PIF_VALUE: 16
PIF_VALUE: 15
PIF_VALUE: 15
PIF_VALUE: 10
PIF_VALUE: 16
PIF_VALUE: 14
PIF_VALUE: 24
PIF_VALUE: 15
PIF_VALUE: 16
PIF_VALUE: 3

## 2019-12-04 ASSESSMENT — PAIN SCALES - GENERAL
PAINLEVEL_OUTOF10: 5
PAINLEVEL_OUTOF10: 10
PAINLEVEL_OUTOF10: 5
PAINLEVEL_OUTOF10: 10
PAINLEVEL_OUTOF10: 10

## 2019-12-04 ASSESSMENT — PAIN DESCRIPTION - ORIENTATION
ORIENTATION: RIGHT;LOWER
ORIENTATION: RIGHT;LOWER

## 2019-12-04 ASSESSMENT — PAIN DESCRIPTION - LOCATION
LOCATION: BACK;HIP
LOCATION: BACK;HIP

## 2019-12-04 ASSESSMENT — PAIN DESCRIPTION - DESCRIPTORS
DESCRIPTORS: ACHING

## 2019-12-04 ASSESSMENT — PAIN DESCRIPTION - PAIN TYPE
TYPE: SURGICAL PAIN
TYPE: SURGICAL PAIN

## 2019-12-04 ASSESSMENT — PAIN - FUNCTIONAL ASSESSMENT: PAIN_FUNCTIONAL_ASSESSMENT: 0-10

## 2019-12-04 ASSESSMENT — PAIN DESCRIPTION - FREQUENCY
FREQUENCY: CONTINUOUS
FREQUENCY: CONTINUOUS

## 2019-12-09 ENCOUNTER — TELEPHONE (OUTPATIENT)
Dept: UROLOGY | Age: 54
End: 2019-12-09

## 2019-12-10 ENCOUNTER — OFFICE VISIT (OUTPATIENT)
Dept: PHYSICAL MEDICINE AND REHAB | Age: 54
End: 2019-12-10
Payer: MEDICARE

## 2019-12-10 VITALS
BODY MASS INDEX: 24.84 KG/M2 | HEART RATE: 80 BPM | WEIGHT: 126.54 LBS | DIASTOLIC BLOOD PRESSURE: 86 MMHG | SYSTOLIC BLOOD PRESSURE: 138 MMHG | HEIGHT: 60 IN

## 2019-12-10 DIAGNOSIS — G43.711 INTRACTABLE CHRONIC MIGRAINE WITHOUT AURA AND WITH STATUS MIGRAINOSUS: Primary | ICD-10-CM

## 2019-12-10 PROCEDURE — 99214 OFFICE O/P EST MOD 30 MIN: CPT | Performed by: PHYSICAL MEDICINE & REHABILITATION

## 2019-12-16 RX ORDER — LAMOTRIGINE 200 MG/1
200 TABLET ORAL DAILY
Qty: 30 TABLET | Refills: 0 | Status: SHIPPED | OUTPATIENT
Start: 2019-12-16 | End: 2020-01-15 | Stop reason: SDUPTHER

## 2019-12-16 RX ORDER — BUPROPION HYDROCHLORIDE 300 MG/1
300 TABLET ORAL EVERY MORNING
Qty: 30 TABLET | Refills: 0 | Status: SHIPPED | OUTPATIENT
Start: 2019-12-16 | End: 2020-01-15 | Stop reason: SDUPTHER

## 2019-12-17 RX ORDER — BUTALBITAL, ACETAMINOPHEN AND CAFFEINE 50; 325; 40 MG/1; MG/1; MG/1
TABLET ORAL
Qty: 180 TABLET | Refills: 1 | Status: SHIPPED | OUTPATIENT
Start: 2019-12-17 | End: 2020-04-23 | Stop reason: SDUPTHER

## 2019-12-30 ENCOUNTER — TELEPHONE (OUTPATIENT)
Dept: PHYSICAL MEDICINE AND REHAB | Age: 54
End: 2019-12-30

## 2020-01-14 ENCOUNTER — OFFICE VISIT (OUTPATIENT)
Dept: UROLOGY | Age: 55
End: 2020-01-14
Payer: COMMERCIAL

## 2020-01-14 VITALS
BODY MASS INDEX: 23.86 KG/M2 | DIASTOLIC BLOOD PRESSURE: 80 MMHG | WEIGHT: 126.4 LBS | HEIGHT: 61 IN | SYSTOLIC BLOOD PRESSURE: 124 MMHG

## 2020-01-14 LAB
BILIRUBIN URINE: NEGATIVE
BLOOD URINE, POC: ABNORMAL
CHARACTER, URINE: CLEAR
COLOR, URINE: YELLOW
GLUCOSE URINE: NEGATIVE MG/DL
KETONES, URINE: NEGATIVE
LEUKOCYTE CLUMPS, URINE: ABNORMAL
NITRITE, URINE: NEGATIVE
PH, URINE: 6 (ref 5–9)
PROTEIN, URINE: NEGATIVE MG/DL
SPECIFIC GRAVITY, URINE: >= 1.03 (ref 1–1.03)
UROBILINOGEN, URINE: 0.2 EU/DL (ref 0–1)

## 2020-01-14 PROCEDURE — 81003 URINALYSIS AUTO W/O SCOPE: CPT | Performed by: UROLOGY

## 2020-01-14 PROCEDURE — 99024 POSTOP FOLLOW-UP VISIT: CPT | Performed by: UROLOGY

## 2020-01-14 RX ORDER — METRONIDAZOLE 7.5 MG/G
GEL TOPICAL
Qty: 1 G | Refills: 1 | Status: SHIPPED | OUTPATIENT
Start: 2020-01-14 | End: 2020-12-03

## 2020-01-14 NOTE — PROGRESS NOTES
53-year-old white female returns today for a follow-up after her sacral nerve stimulator implantation. She states her life is completely different now. She has very good control of her bladder. He has no nocturia now. She has no urinary incontinence. She complains of a scant vaginal discharge and odor most likely Gardnerella vaginalis. I sent in a prescription for MetroGel which she will use vaginally nightly for 5 days. Keeps the sacral nerve stimulator at 1.8. Anything higher than that causes some radiculopathy down her right lower extremity. Her urine has a large amount of blood on dipstick which is not new and has been thoroughly evaluated. She does have a history of IC., but she does not want the DMSO cocktails. Return in 6 months. In excess of 15 minutes was spent with the patient discussing their medical history, treatment outcomes and possible treatment related side effects. Greater than 50 per cent of this time was spent in face to face discussion of current disease status and ongoing management.

## 2020-01-14 NOTE — TELEPHONE ENCOUNTER
Darylene Kind called requesting a refill of wellbutrin xl 300mg and Lamictal 200mg. Patient states she had to reschedule 1/15/20 appointment because she has something else going on in [facility] however she will be out of medications.       Last Appointment Date: 9/4/2019  Next Appointment Date: 2/5/2020

## 2020-01-15 RX ORDER — BUPROPION HYDROCHLORIDE 300 MG/1
300 TABLET ORAL EVERY MORNING
Qty: 30 TABLET | Refills: 0 | Status: SHIPPED | OUTPATIENT
Start: 2020-01-15 | End: 2020-02-05 | Stop reason: SDUPTHER

## 2020-01-15 RX ORDER — LAMOTRIGINE 200 MG/1
200 TABLET ORAL DAILY
Qty: 30 TABLET | Refills: 0 | Status: SHIPPED | OUTPATIENT
Start: 2020-01-15 | End: 2020-02-05 | Stop reason: SDUPTHER

## 2020-02-05 ENCOUNTER — OFFICE VISIT (OUTPATIENT)
Dept: PSYCHIATRY | Age: 55
End: 2020-02-05
Payer: COMMERCIAL

## 2020-02-05 PROCEDURE — 99213 OFFICE O/P EST LOW 20 MIN: CPT | Performed by: NURSE PRACTITIONER

## 2020-02-05 RX ORDER — BUPROPION HYDROCHLORIDE 300 MG/1
300 TABLET ORAL EVERY MORNING
Qty: 90 TABLET | Refills: 0 | Status: SHIPPED | OUTPATIENT
Start: 2020-02-05 | End: 2020-05-09 | Stop reason: SDUPTHER

## 2020-02-05 RX ORDER — LAMOTRIGINE 200 MG/1
200 TABLET ORAL DAILY
Qty: 90 TABLET | Refills: 0 | Status: SHIPPED | OUTPATIENT
Start: 2020-02-05 | End: 2020-05-09 | Stop reason: SDUPTHER

## 2020-02-13 NOTE — TELEPHONE ENCOUNTER
Maty Ojeda called requesting a refill on the following medications:  Requested Prescriptions     Pending Prescriptions Disp Refills    diazepam (VALIUM) 5 MG tablet 90 tablet 0     Sig: Take 1 tablet by mouth every 8 hours as needed for Anxiety or Sleep (spasms). [facility] pv      Date of last visit: 11/12/19  Date of next visit (if applicable): Visit date not found

## 2020-02-14 RX ORDER — DIAZEPAM 5 MG/1
5 TABLET ORAL EVERY 8 HOURS PRN
Qty: 90 TABLET | Refills: 0 | Status: SHIPPED | OUTPATIENT
Start: 2020-02-14 | End: 2020-04-23 | Stop reason: SDUPTHER

## 2020-03-03 ENCOUNTER — TELEPHONE (OUTPATIENT)
Dept: PHYSICAL MEDICINE AND REHAB | Age: 55
End: 2020-03-03

## 2020-03-25 NOTE — TELEPHONE ENCOUNTER
Brandon Rapp, would you be able to call this patient and see if she has seen a neurologist, and what medications she has tried and failed in relation to her headaches/migraines? Please apologize to patient as with everything going on I was not able to contact her in a timely manner.     Thank you

## 2020-04-04 ENCOUNTER — TELEPHONE (OUTPATIENT)
Dept: PRIMARY CARE CLINIC | Age: 55
End: 2020-04-04

## 2020-04-04 NOTE — TELEPHONE ENCOUNTER
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 Patient was last seen by you on 11/12/19. Do you need an appointment with her to continue her total disability or will you sign w/o appt?

## 2020-04-07 ENCOUNTER — TELEPHONE (OUTPATIENT)
Dept: FAMILY MEDICINE CLINIC | Age: 55
End: 2020-04-07

## 2020-04-13 NOTE — TELEPHONE ENCOUNTER
Saranya Davivannesa states she dropped her Disability paper off about 2 wks ago at [employer] office and gave them to the , these forms are for [id number] to make sure still Disabled, and nothing has changed with her condition, Maria De Jesus Roquejosie has filled these out for her before. Please call her before filling these out.

## 2020-04-23 ENCOUNTER — VIRTUAL VISIT (OUTPATIENT)
Dept: FAMILY MEDICINE CLINIC | Age: 55
End: 2020-04-23
Payer: MEDICARE

## 2020-04-23 PROCEDURE — 3017F COLORECTAL CA SCREEN DOC REV: CPT | Performed by: NURSE PRACTITIONER

## 2020-04-23 PROCEDURE — 99213 OFFICE O/P EST LOW 20 MIN: CPT | Performed by: NURSE PRACTITIONER

## 2020-04-23 PROCEDURE — G8427 DOCREV CUR MEDS BY ELIG CLIN: HCPCS | Performed by: NURSE PRACTITIONER

## 2020-04-23 RX ORDER — ALBUTEROL SULFATE 90 UG/1
2 AEROSOL, METERED RESPIRATORY (INHALATION) EVERY 6 HOURS PRN
Qty: 1 INHALER | Refills: 5 | Status: SHIPPED | OUTPATIENT
Start: 2020-04-23 | End: 2020-12-03 | Stop reason: SDUPTHER

## 2020-04-23 RX ORDER — DIAZEPAM 5 MG/1
5 TABLET ORAL EVERY 8 HOURS PRN
Qty: 90 TABLET | Refills: 0 | Status: SHIPPED | OUTPATIENT
Start: 2020-04-23 | End: 2020-07-01 | Stop reason: SDUPTHER

## 2020-04-23 RX ORDER — BUTALBITAL, ACETAMINOPHEN AND CAFFEINE 50; 325; 40 MG/1; MG/1; MG/1
TABLET ORAL
Qty: 180 TABLET | Refills: 1 | Status: SHIPPED | OUTPATIENT
Start: 2020-04-23 | End: 2020-12-03

## 2020-04-23 RX ORDER — PROMETHAZINE HYDROCHLORIDE 25 MG/1
25 TABLET ORAL EVERY 8 HOURS PRN
Qty: 20 TABLET | Refills: 0 | Status: SHIPPED | OUTPATIENT
Start: 2020-04-23 | End: 2020-05-13

## 2020-04-23 ASSESSMENT — ENCOUNTER SYMPTOMS: BACK PAIN: 1

## 2020-04-27 ENCOUNTER — TELEPHONE (OUTPATIENT)
Dept: FAMILY MEDICINE CLINIC | Age: 55
End: 2020-04-27

## 2020-04-27 NOTE — TELEPHONE ENCOUNTER
Note in pts chart states the paperwork was mailed back to the pt on 4/15/20, pt still hasn't received it. Forms and records printed from media tab and faxed to [facility] at [phone] per pts request. Pt notified this was done.

## 2020-04-27 NOTE — TELEPHONE ENCOUNTER
Patient called in saying she has not received her disability paperwork yet in the mail. She needs someone to call her as soon as possible about this.

## 2020-04-29 RX ORDER — LEVOTHYROXINE SODIUM 88 UG/1
88 TABLET ORAL DAILY
Qty: 90 TABLET | Refills: 2 | Status: SHIPPED | OUTPATIENT
Start: 2020-04-29 | End: 2020-07-01 | Stop reason: SDUPTHER

## 2020-05-09 RX ORDER — BUPROPION HYDROCHLORIDE 300 MG/1
300 TABLET ORAL EVERY MORNING
Qty: 90 TABLET | Refills: 0 | Status: SHIPPED | OUTPATIENT
Start: 2020-05-09 | End: 2020-07-29 | Stop reason: SDUPTHER

## 2020-05-09 RX ORDER — LAMOTRIGINE 200 MG/1
200 TABLET ORAL DAILY
Qty: 90 TABLET | Refills: 0 | Status: SHIPPED | OUTPATIENT
Start: 2020-05-09 | End: 2020-07-29 | Stop reason: SDUPTHER

## 2020-07-06 ENCOUNTER — TELEPHONE (OUTPATIENT)
Dept: FAMILY MEDICINE CLINIC | Age: 55
End: 2020-07-06

## 2020-07-06 RX ORDER — BUTALBITAL, ACETAMINOPHEN AND CAFFEINE 50; 325; 40 MG/1; MG/1; MG/1
TABLET ORAL
Qty: 180 TABLET | Refills: 1 | Status: SHIPPED | OUTPATIENT
Start: 2020-07-06 | End: 2020-12-03

## 2020-07-06 NOTE — TELEPHONE ENCOUNTER
Patient calls asking if she can get a script for Fioricet? She said when Mac Dubin prescribed it the last time it was too expensive but she has met her deductible and wants to get it now. Please advise.     DOLV 7/1/20       [facility] [address]

## 2020-07-16 ENCOUNTER — OFFICE VISIT (OUTPATIENT)
Dept: UROLOGY | Age: 55
End: 2020-07-16
Payer: MEDICARE

## 2020-07-16 ENCOUNTER — TELEPHONE (OUTPATIENT)
Dept: UROLOGY | Age: 55
End: 2020-07-16

## 2020-07-16 VITALS — WEIGHT: 130 LBS | BODY MASS INDEX: 25.52 KG/M2 | TEMPERATURE: 96.7 F | HEIGHT: 60 IN

## 2020-07-16 LAB
BILIRUBIN URINE: NEGATIVE
BLOOD URINE, POC: ABNORMAL
CHARACTER, URINE: CLEAR
COLOR, URINE: YELLOW
GLUCOSE URINE: NEGATIVE MG/DL
KETONES, URINE: NEGATIVE
LEUKOCYTE CLUMPS, URINE: NEGATIVE
NITRITE, URINE: NEGATIVE
PH, URINE: 7 (ref 5–9)
POST VOID RESIDUAL (PVR): 0 ML
PROTEIN, URINE: NEGATIVE MG/DL
SPECIFIC GRAVITY, URINE: 1.02 (ref 1–1.03)
UROBILINOGEN, URINE: 0.2 EU/DL (ref 0–1)

## 2020-07-16 PROCEDURE — G8419 CALC BMI OUT NRM PARAM NOF/U: HCPCS | Performed by: UROLOGY

## 2020-07-16 PROCEDURE — 51798 US URINE CAPACITY MEASURE: CPT | Performed by: UROLOGY

## 2020-07-16 PROCEDURE — 99212 OFFICE O/P EST SF 10 MIN: CPT | Performed by: UROLOGY

## 2020-07-16 PROCEDURE — G8427 DOCREV CUR MEDS BY ELIG CLIN: HCPCS | Performed by: UROLOGY

## 2020-07-16 PROCEDURE — 81003 URINALYSIS AUTO W/O SCOPE: CPT | Performed by: UROLOGY

## 2020-07-16 PROCEDURE — 3017F COLORECTAL CA SCREEN DOC REV: CPT | Performed by: UROLOGY

## 2020-07-16 PROCEDURE — 1036F TOBACCO NON-USER: CPT | Performed by: UROLOGY

## 2020-07-16 NOTE — TELEPHONE ENCOUNTER
Talked to [facility]. With the new devices the InterStims can be reprogrammed through the site. He will call Ana Sweet tomorrow, 07- to see about reprogramming her device. If he can't he will call to see if he can see her on 07- when he is here.

## 2020-07-29 ENCOUNTER — OFFICE VISIT (OUTPATIENT)
Dept: PSYCHIATRY | Age: 55
End: 2020-07-29
Payer: MEDICARE

## 2020-07-29 PROCEDURE — G8427 DOCREV CUR MEDS BY ELIG CLIN: HCPCS | Performed by: NURSE PRACTITIONER

## 2020-07-29 PROCEDURE — G8419 CALC BMI OUT NRM PARAM NOF/U: HCPCS | Performed by: NURSE PRACTITIONER

## 2020-07-29 PROCEDURE — 99212 OFFICE O/P EST SF 10 MIN: CPT | Performed by: NURSE PRACTITIONER

## 2020-07-29 PROCEDURE — 1036F TOBACCO NON-USER: CPT | Performed by: NURSE PRACTITIONER

## 2020-07-29 PROCEDURE — 3017F COLORECTAL CA SCREEN DOC REV: CPT | Performed by: NURSE PRACTITIONER

## 2020-07-29 RX ORDER — BUPROPION HYDROCHLORIDE 300 MG/1
300 TABLET ORAL EVERY MORNING
Qty: 90 TABLET | Refills: 0 | Status: SHIPPED | OUTPATIENT
Start: 2020-07-29 | End: 2020-10-28 | Stop reason: SDUPTHER

## 2020-07-29 RX ORDER — LAMOTRIGINE 200 MG/1
200 TABLET ORAL DAILY
Qty: 90 TABLET | Refills: 0 | Status: SHIPPED | OUTPATIENT
Start: 2020-07-29 | End: 2020-10-28 | Stop reason: SDUPTHER

## 2020-07-29 NOTE — PROGRESS NOTES
CC: Bipolar II D/O     HPI  Medina Coreas is seen for follow up and  medication management. Reports meds continue to work \"ok\" without side effects.  .Denies having a rash. Good med compliance is reported. Denies mood swings. Denies depression. Denies feelings of harm towards self or others. Reports still dealing with Aldair Wilson and feels this is still her primary issue.  Was told has DDD but was at moderate level. Reports received a stimulator to decrease pain. but does not work,  Reports still receives   counseling at [facility] and needs to make an appt. . . Reports she and  are still getting along well with . . Labs reviewed drawn 5-8-2019. No critical abnormals noted. Another TSH low. Reports PCP is addressing. Noted GFR = 88 reflects mild kidney compromise. Reports PCP is aware. Reports has lab orders to be done per PCP.  Reports still  being on Valium Rxed by PCP for anxiety and sleep. Client advised again Valium would not be Rxed from this office.      Past Medical Hx:  Reports allergies to Tramadol. Codeine, Percocets, Vicodin  Reports having Hypothyroidism, Migraine Headaches, HTN. DDD, Asthma, Osteoarthritis  Reports partial hysterectomy 2001     Past Psychiatric Hx:  Denies any past psych hospitalizations. Denies any past suicidal gestures. Denies ever being under care of psychiatrist. Reports receives counseling at [facility]. Currently xanax Rxed by PCP and Valium Rxed by PCP. 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 MARITAL STATUS: Currently  2nd marriage. For 24 years. Reports relationship is good. OCCUPATION: Currently on Disability. Last worked at [employer] left there 2015  [address]: Reports having associates degree in [employer]. [employer]. [employer].   LIVING SITUATION: [place]. Has 2 grown children. LEGAL:  Reports past 2 DUI's. Last was 1994     MSE:  Level of consciousness: Alert  Appearance: in chair and fair grooming   Behavior/Motor: no abnormalities noted   Attitude toward examiner: cooperative   Speech: Normal volume, goal directed, NRR  Mood: Euthymic  Affect: Reactive  Thought processes: Linear and goal directed   Suicidal Ideation: Denies suicidal ideations  Homicidal ideation: Denies homicidal ideations  Delusions: No evidence of delusions is observed  Perceptual Disturbance: Denies AH/VH;  No evidence of psychosis is observed. Cognition: Oriented to person, place, time and situation   Concentration fair   Memory intact   Insight: Fair  Judgment: Fair     ROS:  Constitutional: Negative for fever, chills and fatigue. HENT: Negative for ear pain, congestion, rhinorrhea and neck pain. Eyes: Negative for pain and discharge. Respiratory: Negative for cough, chest tightness and wheezing. Repots being on Advair for asthma  Cardiovascular: Negative for chest pain, palpitations and leg swelling. Gastrointestinal: Negative for nausea, vomiting and diarrhea. Genitourinary: Negative for dysuria and frequency. Musculoskeletal: Reports still having chronic back and neck  pain. Being followed by Dr Vishal Hampton. Skin: Negative for rash. Neurological: Negative for dizziness, weakness. Reports has migraine headaches     Impression:  Bipolar II D/O     Plan:  Continue current meds:  Lamictal 200mg po q am    Wellbutrin XL 300mg po q am  Med education given. Cisco Anderson voiced understanding of education given. Advised to continue individual counseling.   Advised to get labs done.    RTC 3 mos

## 2020-07-29 NOTE — TELEPHONE ENCOUNTER
Patient called stating she never heard from Codi Montelongo and needed to be seen ASAP for her InterStim. An email message was sent to Codi Montelongo from [employer] to have him call her.

## 2020-08-10 ENCOUNTER — HOSPITAL ENCOUNTER (OUTPATIENT)
Age: 55
Discharge: HOME | End: 2020-08-10
Payer: MEDICARE

## 2020-08-10 ENCOUNTER — TELEPHONE (OUTPATIENT)
Dept: FAMILY MEDICINE CLINIC | Age: 55
End: 2020-08-10

## 2020-08-10 LAB
ALBUMIN SERPL-MCNC: 4.6 G/DL (ref 3.5–5.1)
ALP BLD-CCNC: 70 U/L (ref 38–126)
ALT SERPL-CCNC: 22 U/L (ref 11–66)
ANION GAP SERPL CALCULATED.3IONS-SCNC: 9 MEQ/L (ref 8–16)
AST SERPL-CCNC: 21 U/L (ref 5–40)
BASOPHILS # BLD: 0.7 %
BASOPHILS ABSOLUTE: 0 THOU/MM3 (ref 0–0.1)
BILIRUB SERPL-MCNC: 0.3 MG/DL (ref 0.3–1.2)
BUN BLDV-MCNC: 11 MG/DL (ref 7–22)
CALCIUM SERPL-MCNC: 9.7 MG/DL (ref 8.5–10.5)
CHLORIDE BLD-SCNC: 106 MEQ/L (ref 98–111)
CHOLESTEROL, TOTAL: 203 MG/DL (ref 100–199)
CO2: 27 MEQ/L (ref 23–33)
CREAT SERPL-MCNC: 0.6 MG/DL (ref 0.4–1.2)
EOSINOPHIL # BLD: 5.1 %
EOSINOPHILS ABSOLUTE: 0.3 THOU/MM3 (ref 0–0.4)
ERYTHROCYTE [DISTWIDTH] IN BLOOD BY AUTOMATED COUNT: 13 % (ref 11.5–14.5)
ERYTHROCYTE [DISTWIDTH] IN BLOOD BY AUTOMATED COUNT: 44.6 FL (ref 35–45)
GFR SERPL CREATININE-BSD FRML MDRD: > 90 ML/MIN/1.73M2
GLUCOSE BLD-MCNC: 100 MG/DL (ref 70–108)
HCT VFR BLD CALC: 40.9 % (ref 37–47)
HDLC SERPL-MCNC: 80 MG/DL
HEMOGLOBIN: 13.3 GM/DL (ref 12–16)
IMMATURE GRANS (ABS): 0.02 THOU/MM3 (ref 0–0.07)
IMMATURE GRANULOCYTES: 0.4 %
LDL CHOLESTEROL CALCULATED: 102 MG/DL
LYMPHOCYTES # BLD: 26.8 %
LYMPHOCYTES ABSOLUTE: 1.4 THOU/MM3 (ref 1–4.8)
MCH RBC QN AUTO: 30.6 PG (ref 26–33)
MCHC RBC AUTO-ENTMCNC: 32.5 GM/DL (ref 32.2–35.5)
MCV RBC AUTO: 94 FL (ref 81–99)
MONOCYTES # BLD: 9.7 %
MONOCYTES ABSOLUTE: 0.5 THOU/MM3 (ref 0.4–1.3)
NUCLEATED RED BLOOD CELLS: 0 /100 WBC
PLATELET # BLD: 269 THOU/MM3 (ref 130–400)
PMV BLD AUTO: 10.7 FL (ref 9.4–12.4)
POTASSIUM SERPL-SCNC: 4.7 MEQ/L (ref 3.5–5.2)
RBC # BLD: 4.35 MILL/MM3 (ref 4.2–5.4)
SEG NEUTROPHILS: 57.3 %
SEGMENTED NEUTROPHILS ABSOLUTE COUNT: 3 THOU/MM3 (ref 1.8–7.7)
SODIUM BLD-SCNC: 142 MEQ/L (ref 135–145)
T4 FREE: 1.46 NG/DL (ref 0.93–1.76)
TOTAL PROTEIN: 6.9 G/DL (ref 6.1–8)
TRIGL SERPL-MCNC: 107 MG/DL (ref 0–199)
TSH SERPL DL<=0.05 MIU/L-ACNC: 0.41 UIU/ML (ref 0.4–4.2)
WBC # BLD: 5.3 THOU/MM3 (ref 4.8–10.8)

## 2020-08-10 PROCEDURE — 80061 LIPID PANEL: CPT

## 2020-08-10 PROCEDURE — 85025 COMPLETE CBC W/AUTO DIFF WBC: CPT

## 2020-08-10 PROCEDURE — 84439 ASSAY OF FREE THYROXINE: CPT

## 2020-08-10 PROCEDURE — 36415 COLL VENOUS BLD VENIPUNCTURE: CPT

## 2020-08-10 PROCEDURE — 84443 ASSAY THYROID STIM HORMONE: CPT

## 2020-08-10 PROCEDURE — 80053 COMPREHEN METABOLIC PANEL: CPT

## 2020-08-10 NOTE — TELEPHONE ENCOUNTER
Called patient regarding her lab results. Pt mentioned that she is struggling with wearing a mask. She was in [employer] and got SOB and had to take the mask down. She is using her inhalers. Please advise.

## 2020-08-11 RX ORDER — MONTELUKAST SODIUM 10 MG/1
10 TABLET ORAL NIGHTLY
Qty: 30 TABLET | Refills: 5 | Status: SHIPPED | OUTPATIENT
Start: 2020-08-11 | End: 2020-12-03 | Stop reason: SDUPTHER

## 2020-09-16 ENCOUNTER — VIRTUAL VISIT (OUTPATIENT)
Dept: FAMILY MEDICINE CLINIC | Age: 55
End: 2020-09-16
Payer: MEDICARE

## 2020-09-16 PROCEDURE — 3017F COLORECTAL CA SCREEN DOC REV: CPT | Performed by: NURSE PRACTITIONER

## 2020-09-16 PROCEDURE — G8427 DOCREV CUR MEDS BY ELIG CLIN: HCPCS | Performed by: NURSE PRACTITIONER

## 2020-09-16 PROCEDURE — 99213 OFFICE O/P EST LOW 20 MIN: CPT | Performed by: NURSE PRACTITIONER

## 2020-09-16 RX ORDER — ALBUTEROL SULFATE 90 UG/1
2 AEROSOL, METERED RESPIRATORY (INHALATION) EVERY 6 HOURS PRN
Qty: 1 INHALER | Refills: 1 | Status: SHIPPED | OUTPATIENT
Start: 2020-09-16 | End: 2020-12-03 | Stop reason: SDUPTHER

## 2020-09-16 RX ORDER — AZITHROMYCIN 250 MG/1
TABLET, FILM COATED ORAL
Qty: 6 TABLET | Refills: 0 | Status: SHIPPED | OUTPATIENT
Start: 2020-09-16 | End: 2020-09-26

## 2020-09-16 RX ORDER — BENZONATATE 200 MG/1
200 CAPSULE ORAL 3 TIMES DAILY PRN
Qty: 30 CAPSULE | Refills: 0 | Status: SHIPPED | OUTPATIENT
Start: 2020-09-16 | End: 2020-09-23

## 2020-09-16 ASSESSMENT — ENCOUNTER SYMPTOMS
BACK PAIN: 1
SHORTNESS OF BREATH: 1
COUGH: 1
GASTROINTESTINAL NEGATIVE: 1
SINUS PRESSURE: 1
SINUS PAIN: 1

## 2020-09-16 NOTE — PROGRESS NOTES
[address] Shira Davis [facility] [address]  Dept: [phone]  Dept Fax: [phone]  Loc: [phone]  PROGRESS NOTE      VisitDate: 9/16/2020    Fabrizio Estrada is a 50 y.o. female who presents today for:     Chief Complaint   Patient presents with    Concern For COVID-19    Cough         Subjective:  Patient presents for virtual visit through [employer]. Patient at home. Provider in office. Visit was transferred to a phone call due to poor quality. Patient was diagnosed with COVID on 9-1-20. Complains of continued cough, congestion, shortness of breath, fatigue. Reports fevers resolved. Taking Michaela[place] over-the-counter, With minimal relief. Review of Systems   Constitutional: Positive for fatigue and fever. HENT: Positive for congestion, sinus pressure and sinus pain. Respiratory: Positive for cough and shortness of breath. Cardiovascular: Negative. Gastrointestinal: Negative. Genitourinary: Negative. Musculoskeletal: Positive for back pain and neck pain. Neurological: Negative. Hematological: Negative. Psychiatric/Behavioral: Negative.       Past Medical History:   Diagnosis Date    Anxiety     Asthma     Autoimmune disease ([address]     Bipolar 1 disorder ([address]     Blood in urine     Bronchitis     Cataracts, bilateral     Degenerative cervical disc     Degenerative disc disease     Depression     Frequent headaches     Hypertension     Hypothyroidism     Interstitial cystitis     Iritis 2013    OU      Migraines     Osteopenia     PONV (postoperative nausea and vomiting)     Shortness of breath     Thyroid disease       Past Surgical History:   Procedure Laterality Date    BACK SURGERY      multiple injections and nerve blocks at cervical and lumbar    CERVICAL FUSION  11/09    c 4,5,6    CERVICAL FUSION  11/20/2015    CYSTOSCOPY N/A 4/25/2019    CYSTOSCOPY WITH HYDRODISTENTION WITH INSTILLATION OF DMSO AND MARCAINE performed by Jimi Augustine MD at [address] (W OR W/O BIOPSY)      HC PAIN BLOCK Bilateral 2019    Third occipital nerve block bilateral. Left trigger point trapezius performed by Jennifer Juarez MD at [facility]      INCONTINENCE SURGERY      SPINAL CORD STIMULATOR IMPLANTATION N/A 2019    STAGE 1 / INSERTION OF INTERSTIM DEVICE performed by Jimi Augustine MD at [facility] N/A 2019    STAGE II STIMULATOR INSERTION performed by Jimi Augustine MD at [address] [place] [address] History   Problem Relation Age of Onset    Rheum Arthritis Mother     Thyroid Disease Mother     Colon Polyps Mother         esophageal polyps    Other Mother     High Blood Pressure Father     Diabetes Father     High Blood Pressure Sister     Thyroid Disease Brother     High Blood Pressure Brother     Liver Cancer Maternal Uncle     Cancer Maternal Grandfather         pancreatic cancer    Colon Cancer Neg Hx      Social History     Tobacco Use    Smoking status: Former Smoker     Types: Cigarettes     Last attempt to quit:      Years since quittin.7    Smokeless tobacco: Never Used    Tobacco comment: 1 pack per week intermit for 20 yrs   Substance Use Topics    Alcohol use: No     Alcohol/week: 0.0 standard drinks      Current Outpatient Medications   Medication Sig Dispense Refill    benzonatate (TESSALON) 200 MG capsule Take 1 capsule by mouth 3 times daily as needed for Cough 30 capsule 0    albuterol sulfate HFA (PROAIR HFA) 108 (90 Base) MCG/ACT inhaler Inhale 2 puffs into the lungs every 6 hours as needed for Wheezing 1 Inhaler 1    azithromycin (ZITHROMAX Z-SIENNA) 250 MG tablet 2 pills orally for 1 day, then 1 pill orally for 4 days 6 tablet 0    montelukast (SINGULAIR) 10 MG tablet Take 1 tablet by mouth nightly 30 tablet 5  lamoTRIgine (LAMICTAL) 200 MG tablet Take 1 tablet by mouth daily 90 tablet 0    buPROPion (WELLBUTRIN XL) 300 MG extended release tablet Take 1 tablet by mouth every morning 90 tablet 0    butalbital-acetaminophen-caffeine (FIORICET, ESGIC) -40 MG per tablet TAKE 1 TO 2 TABLETS BY MOUTH EVERY 6 HOURS AS NEEDED FOR HEADACHE 180 tablet 1    levothyroxine (SYNTHROID) 88 MCG tablet Take 1 tablet by mouth daily 90 tablet 2    diazePAM (VALIUM) 5 MG tablet Take 1 tablet by mouth every 8 hours as needed for Anxiety or Sleep (spasms). 90 tablet 0    hydrocortisone (ANUSOL-HC) 25 MG suppository Place 1 suppository rectally every 12 hours 30 suppository 1    lubiprostone (AMITIZA) 24 MCG capsule Take 1 capsule by mouth 2 times daily (with meals) 60 capsule 3    butalbital-acetaminophen-caffeine (FIORICET, ESGIC) -40 MG per tablet TAKE 1 TO 2 TABLETS BY MOUTH EVERY 6 HOURS AS NEEDED FOR HEADACHE 180 tablet 1    albuterol sulfate  (90 Base) MCG/ACT inhaler Inhale 2 puffs into the lungs every 6 hours as needed for Wheezing 1 Inhaler 5    fluticasone-salmeterol (ADVAIR DISKUS) 100-50 MCG/DOSE diskus inhaler Inhale 1 puff into the lungs every 12 hours 60 each 3    metroNIDAZOLE (METROGEL) 0.75 % gel Apply intravaginally daily,at bedtime for 5 days 1 g 1    linaCLOtide (LINZESS) 72 MCG CAPS capsule Take 1 capsule by mouth every morning (before breakfast) 30 capsule 2    neomycin-polymyxin-dexameth (MAXITROL) 3.5-06814-5.1 ophthalmic suspension Place 1 drop into both eyes As needed for dry eyes      amitriptyline (ELAVIL) 10 MG tablet Take 1 tablet by mouth nightly 30 tablet 5    cyclobenzaprine (FLEXERIL) 10 MG tablet Take 1 tablet by mouth nightly as needed for Muscle spasms 30 tablet 1    lisinopril (PRINIVIL;ZESTRIL) 20 MG tablet Take 1 tablet by mouth daily 30 tablet 5    hydrocortisone 2.5 % cream Apply topically 2 times daily.  1 Tube 1    lidocaine (XYLOCAINE) 2 % jelly Apply 2-3 times daily 1 Tube 3     No current facility-administered medications for this visit. Allergies   Allergen Reactions    Abilify [Aripiprazole] Other (See Comments)     Vivid dreams    Cephalexin Nausea And Vomiting    Codeine Nausea And Vomiting    Excedrin Migraine [Aspirin-Acetaminophen-Caffeine] Nausea And Vomiting    Neurontin [Gabapentin] Anxiety    Nucynta [Tapentadol Hcl Er] Other (See Comments)     Terrible headache    Percocet [Oxycodone-Acetaminophen] Nausea And Vomiting    Tramadol Nausea And Vomiting    Tylenol With Codeine #3 [Acetaminophen-Codeine]      Nausea     Vicodin [Hydrocodone-Acetaminophen] Nausea And Vomiting     Health Maintenance   Topic Date Due    Pneumococcal 0-64 years Vaccine (1 of 1 - PPSV23) 06/27/1973    DTaP/Tdap/Td vaccine (1 - Tdap) 06/27/1986    Cervical cancer screen  06/27/1988    Shingles Vaccine (1 of 2) 06/27/2017    A1C test (Diabetic or Prediabetic)  12/12/2017    Breast cancer screen  10/05/2019    Annual Wellness Visit (AWV)  04/22/2020    Flu vaccine (1) 09/01/2020    TSH testing  08/10/2021    Potassium monitoring  08/10/2021    Creatinine monitoring  08/10/2021    Lipid screen  08/10/2025    Colon cancer screen colonoscopy  08/06/2030    HIV screen  Completed    Hepatitis A vaccine  Aged Out    Hepatitis B vaccine  Aged Out    Hib vaccine  Aged Out    Meningococcal (ACWY) vaccine  Aged Out         Objective:     Physical Exam  Constitutional:       Appearance: Normal appearance. Neurological:      General: No focal deficit present. Mental Status: She is alert and oriented to person, place, and time. Psychiatric:         Mood and Affect: Mood normal.         Thought Content: Thought content normal.       There were no vitals taken for this visit. Impression/Plan:  1.  COVID-19      Requested Prescriptions     Signed Prescriptions Disp Refills    benzonatate (TESSALON) 200 MG capsule 30 capsule 0     Sig: Take 1 capsule by mouth 3 times daily as needed for Cough    albuterol sulfate HFA (PROAIR HFA) 108 (90 Base) MCG/ACT inhaler 1 Inhaler 1     Sig: Inhale 2 puffs into the lungs every 6 hours as needed for Wheezing    azithromycin (ZITHROMAX Z-SIENNA) 250 MG tablet 6 tablet 0     Si pills orally for 1 day, then 1 pill orally for 4 days     No orders of the defined types were placed in this encounter. Patient giveneducational materials - see patient instructions. Discussed use, benefit, and side effects of prescribed medications. All patient questions answered. Pt voiced understanding. Reviewed health maintenance. Patient agreedwith treatment plan. Follow up as directed. 15 minutes. Follow-up in office next week if symptoms persist.  Hold on chest x-ray at this time. Patient instructed to return to ED if symptoms worsen.     Electronically signed by DEYANIRA Maguire CNP on 2020 at 10:07 AM

## 2020-10-06 NOTE — TELEPHONE ENCOUNTER
Sondra Rubio called requesting a refill on the following medications:  Requested Prescriptions     Pending Prescriptions Disp Refills    diazePAM (VALIUM) 5 MG tablet 90 tablet 0     Sig: Take 1 tablet by mouth every 8 hours as needed for Anxiety or Sleep (spasms).      Pharmacy verified:  .[facility]    Date of last visit: 9/16/20  Date of next visit (if applicable): Visit date not found

## 2020-10-07 RX ORDER — DIAZEPAM 5 MG/1
5 TABLET ORAL EVERY 8 HOURS PRN
Qty: 90 TABLET | Refills: 0 | Status: SHIPPED | OUTPATIENT
Start: 2020-10-07 | End: 2020-12-03 | Stop reason: ALTCHOICE

## 2020-10-28 ENCOUNTER — OFFICE VISIT (OUTPATIENT)
Dept: PSYCHIATRY | Age: 55
End: 2020-10-28
Payer: MEDICARE

## 2020-10-28 PROCEDURE — G8484 FLU IMMUNIZE NO ADMIN: HCPCS | Performed by: NURSE PRACTITIONER

## 2020-10-28 PROCEDURE — 1036F TOBACCO NON-USER: CPT | Performed by: NURSE PRACTITIONER

## 2020-10-28 PROCEDURE — 3017F COLORECTAL CA SCREEN DOC REV: CPT | Performed by: NURSE PRACTITIONER

## 2020-10-28 PROCEDURE — G8419 CALC BMI OUT NRM PARAM NOF/U: HCPCS | Performed by: NURSE PRACTITIONER

## 2020-10-28 PROCEDURE — 99213 OFFICE O/P EST LOW 20 MIN: CPT | Performed by: NURSE PRACTITIONER

## 2020-10-28 PROCEDURE — G8427 DOCREV CUR MEDS BY ELIG CLIN: HCPCS | Performed by: NURSE PRACTITIONER

## 2020-10-28 RX ORDER — BUPROPION HYDROCHLORIDE 300 MG/1
300 TABLET ORAL EVERY MORNING
Qty: 90 TABLET | Refills: 0 | Status: SHIPPED | OUTPATIENT
Start: 2020-10-28 | End: 2021-02-17 | Stop reason: SDUPTHER

## 2020-10-28 RX ORDER — LAMOTRIGINE 200 MG/1
200 TABLET ORAL DAILY
Qty: 90 TABLET | Refills: 0 | Status: SHIPPED | OUTPATIENT
Start: 2020-10-28 | End: 2021-02-17 | Stop reason: SDUPTHER

## 2020-10-28 NOTE — PROGRESS NOTES
Progress Note      CC: Bipolar II D/O     HPI  Sayra Boateng is seen for follow up and  medication management. States meds continue to work well without side effects.  .Denies having a rash. Good med compliance is reported. Denies mood swings. Denies depression. Denies feelings of harm towards self or others. Reports still dealing with  pain and feels this is still her primary issue. Reports was positive for Covid 19 2 mos ago. And feels much better.  Reports still receives  Reports unable to receive counseling from [facility] as they do not accept medicare. Reports looking for another counselor. Reports father passed 1 month agoa nd is dealing with this \"ok\" Reports  is very supportive. . Reports she and  are still getting along well with . Wil President reviewed drawn 8- No critical abnormals noted.   Reports still  being on Valium Rxed by PCP for anxiety and sleep. Client advised again Valium would not be Rxed from this office.      Past Medical Hx:  Reports allergies to Tramadol. Codeine, Percocets, Vicodin  Reports having Hypothyroidism, Migraine Headaches, HTN. DDD, Asthma, Osteoarthritis  Reports partial hysterectomy 2001     Past Psychiatric Hx:  Denies any past psych hospitalizations. Denies any past suicidal gestures. Denies ever being under care of psychiatrist. Reports receives counseling at [facility]. Currently xanax Rxed by PCP and Valium Rxed by PCP. 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 MARITAL STATUS: Currently  2nd marriage. For 24 years. Reports relationship is good. OCCUPATION: Currently on Disability. Last worked at [employer] left there 2015  [address]: Reports having associates degree in [employer].   LIVING SITUATION: Lives with  in [facility]. Has 2 grown children. LEGAL:  Reports past 2 DUI's. Last was 1994     MSE:  Level of consciousness: Alert  Appearance: in chair and fair grooming   Behavior/Motor: no abnormalities noted   Attitude toward examiner: cooperative   Speech: Normal volume, goal directed, NRR  Mood: Euthymic  Affect: Reactive  Thought processes: Linear and goal directed   Suicidal Ideation: Denies suicidal ideations  Homicidal ideation: Denies homicidal ideations  Delusions: No evidence of delusions is observed  Perceptual Disturbance: Denies AH/VH;  No evidence of psychosis is observed. Cognition: Oriented to person, place, time and situation   Concentration fair   Memory intact   Insight: Fair  Judgment: Fair     ROS:  Constitutional: Negative for fever, chills and fatigue. HENT: Negative for ear pain, congestion, rhinorrhea and neck pain. Eyes: Negative for pain and discharge. Respiratory: Negative for cough, chest tightness and wheezing. Repots being on Advair for asthma  Cardiovascular: Negative for chest pain, palpitations and leg swelling. Gastrointestinal: Negative for nausea, vomiting and diarrhea. Genitourinary: Negative for dysuria and frequency. Musculoskeletal: Reports still having chronic back and neck  pain. Being followed by Dr Jeremie Cisneros. Skin: Negative for rash. Neurological: Negative for dizziness, weakness. Reports has migraine headaches     Impression:  Bipolar II D/O     Plan:  Continue current meds:  Lamictal 200mg po q am    Wellbutrin XL 300mg po q am  Med education given.  Niurka Petty voiced understanding of education given.   Advised to pursue  individual counseling.   RTC 3 mos

## 2020-11-10 ENCOUNTER — VIRTUAL VISIT (OUTPATIENT)
Dept: PHYSICAL MEDICINE AND REHAB | Age: 55
End: 2020-11-10
Payer: MEDICARE

## 2020-11-10 ENCOUNTER — TELEPHONE (OUTPATIENT)
Dept: PHYSICAL MEDICINE AND REHAB | Age: 55
End: 2020-11-10

## 2020-11-10 PROCEDURE — 99213 OFFICE O/P EST LOW 20 MIN: CPT | Performed by: PHYSICAL MEDICINE & REHABILITATION

## 2020-11-10 PROCEDURE — 3017F COLORECTAL CA SCREEN DOC REV: CPT | Performed by: PHYSICAL MEDICINE & REHABILITATION

## 2020-11-10 PROCEDURE — G8428 CUR MEDS NOT DOCUMENT: HCPCS | Performed by: PHYSICAL MEDICINE & REHABILITATION

## 2020-11-10 NOTE — TELEPHONE ENCOUNTER
Pascual Ulloa    Per Dr Mesha Bryan, this patient is scheduled to come back in 4 weeks for Botox injections for headache. This patient is asking if we received an approval letter for her Botox injections because she knows she's approved but think's she lost her letter. I do not see a letter scanned in patient chart. Per Dr Mesha Bryan 11/10/2020 note:  Approved for botox. Advised the patient to bring the approval letter.

## 2020-11-10 NOTE — PROGRESS NOTES
FOLLOW UP APPOINTMENT: VIRTUAL VISIT         Erick Calalway MD    11/10/2020       Breanne Welch is a 50 y.o. female, who came for a  follow up to discuss treatment options    Cause of the symptom(s): Migraine headche    Onset: chronic back pain     Current pain level: 5 on the scale of 0-10 ( 10 being worst)     Quality of Symptoms: aching and throbbing    Aggravating factors: activity    Relieving factors: rest and lying down      Allergies   Allergen Reactions    Abilify [Aripiprazole] Other (See Comments)     Vivid dreams    Cephalexin Nausea And Vomiting    Codeine Nausea And Vomiting    Excedrin Migraine [Aspirin-Acetaminophen-Caffeine] Nausea And Vomiting    Neurontin [Gabapentin] Anxiety    Nucynta [Tapentadol Hcl Er] Other (See Comments)     Terrible headache    Percocet [Oxycodone-Acetaminophen] Nausea And Vomiting    Tramadol Nausea And Vomiting    Tylenol With Codeine #3 [Acetaminophen-Codeine]      Nausea     Vicodin [Hydrocodone-Acetaminophen] Nausea And Vomiting       Social History     Socioeconomic History    Marital status:      Spouse name: Not on file    Number of children: Not on file    Years of education: Not on file    Highest education level: Not on file   Occupational History    Not on file   Social Needs    Financial resource strain: Not on file    Food insecurity     Worry: Not on file     Inability: Not on file    Transportation needs     Medical: Not on file     Non-medical: Not on file   Tobacco Use    Smoking status: Former Smoker     Types: Cigarettes     Last attempt to quit:      Years since quittin.8    Smokeless tobacco: Never Used    Tobacco comment: 1 pack per week intermit for 20 yrs   Substance and Sexual Activity    Alcohol use: No     Alcohol/week: 0.0 standard drinks    Drug use: No    Sexual activity: Not on file   Lifestyle    Physical activity     Days per week: Not on file     Minutes per session: Not on file    Stress: Not on file   Relationships    Social connections     Talks on phone: Not on file     Gets together: Not on file     Attends Mandaen service: Not on file     Active member of club or organization: Not on file     Attends meetings of clubs or organizations: Not on file     Relationship status: Not on file    Intimate partner violence     Fear of current or ex partner: Not on file     Emotionally abused: Not on file     Physically abused: Not on file     Forced sexual activity: Not on file   Other Topics Concern    Not on file   Social History Narrative    Not on file       Past Medical History:   Diagnosis Date    Anxiety     Asthma     [facility]     Bipolar 1 disorder ([facility]     Blood in urine     Bronchitis     Cataracts, bilateral     Degenerative cervical disc     Degenerative disc disease     Depression     Frequent headaches     Hypertension     Hypothyroidism     Interstitial cystitis     Iritis 2013    OU      Migraines     Osteopenia     PONV (postoperative nausea and vomiting)     Shortness of breath     Thyroid disease        Past Surgical History:   Procedure Laterality Date    BACK SURGERY      multiple injections and nerve blocks at cervical and lumbar    CERVICAL FUSION  11/09    c 4,5,6    CERVICAL FUSION  11/20/2015    CYSTOSCOPY N/A 4/25/2019    CYSTOSCOPY WITH HYDRODISTENTION WITH INSTILLATION OF DMSO AND MARCAINE performed by Kai Cohen MD at [address] (W OR W/O BIOPSY)  2011    HC PAIN BLOCK Bilateral 11/6/2019    Third occipital nerve block bilateral. Left trigger point trapezius performed by Queenie Rodriguez MD at [address] [facility]  4/02    [address] [place] [address] 95 N/A 11/20/2019    STAGE 1 / INSERTION OF INTERSTIM DEVICE performed by Kai Cohen MD at [facility] N/A 12/4/2019    STAGE 2 puffs into the lungs every 6 hours as needed for Wheezing  DEYANIRA Jacinto CNP   fluticasone-salmeterol (ADVAIR DISKUS) 100-50 MCG/DOSE diskus inhaler Inhale 1 puff into the lungs every 12 hours  DEYANIRA Jacinto CNP   metroNIDAZOLE (METROGEL) 0.75 % gel Apply intravaginally daily,at bedtime for 5 days  Tiffany Patel MD   linaCLOtide (LINZESS) 72 MCG CAPS capsule Take 1 capsule by mouth every morning (before breakfast)  DEYANIRA iNchols CNP   neomycin-polymyxin-dexameth (MAXITROL) 3.5-49003-7.1 ophthalmic suspension Place 1 drop into both eyes As needed for dry eyes  Historical Provider, MD   amitriptyline (ELAVIL) 10 MG tablet Take 1 tablet by mouth nightly  Tiffany Patel MD   cyclobenzaprine (FLEXERIL) 10 MG tablet Take 1 tablet by mouth nightly as needed for Muscle spasms  DEYANIRA Jacinto CNP   lisinopril (PRINIVIL;ZESTRIL) 20 MG tablet Take 1 tablet by mouth daily  DEYANIRA Jacinto CNP   hydrocortisone 2.5 % cream Apply topically 2 times daily. DEYANIRA Nichols CNP   lidocaine (XYLOCAINE) 2 % jelly Apply 2-3 times daily  DEYANIRA Nichols CNP       Review of Systems : All systems reviewed, all unremarkable other than HPI/subjective. No change since last visit. Denies  fever, chills, infection or non healing wound. Physical Exam  HENT:      Head: Normocephalic. Pulmonary:      Effort: Pulmonary effort is normal. No respiratory distress. Skin:     General: Skin is dry. Neurological:      General: No focal deficit present. Mental Status: She is alert and oriented to person, place, and time. Psychiatric:         Mood and Affect: Mood normal.         Assessment and Plan:      Diagnosis Orders   1. Intractable chronic migraine without aura and with status migrainosus           Approved for botox. Advised the patient to bring the approval letter.      Schedule for injection    Breanne Welch is a 50 y.o. female being evaluated by a Virtual Visit (video visit) encounter to address concerns as mentioned above. A caregiver was present when appropriate. Due to this being a TeleHealth encounter (During OPLRV-92 public health emergency), evaluation of the following organ systems was limited: Vitals/Constitutional/EENT/Resp/CV/GI//MS/Neuro/Skin/Heme-Lymph-Imm. Pursuant to the emergency declaration under the [address] [place] [address] and the [employer] and [employer] Act, this Virtual Visit was conducted with patient's (and/or legal guardian's) consent, to reduce the patient's risk of exposure to COVID-19 and provide necessary medical care. The patient (and/or legal guardian) has also been advised to contact this office for worsening conditions or problems, and seek emergency medical treatment and/or call 911 if deemed necessary. Patient identification was verified at the start of the visit: Yes    Total time spent for this encounter: Not billed by time    Services were provided through a video synchronous discussion virtually to substitute for in-person clinic visit. Patient and provider were located at their individual homes. --Annmarie Barnes MD on 11/19/2020 at 6:42 PM    An electronic signature was used to authenticate this note.

## 2020-11-13 NOTE — TELEPHONE ENCOUNTER
Spoke with patient regarding approval letter. She stated she cannot find letter. However, she called insurance company and they said you can get the information needed for approval (dated 3-4-2020) by calling this number:    [phone]    Informed pt we would touch base with her when we get more information.

## 2020-11-25 NOTE — TELEPHONE ENCOUNTER
Spoke with insurance. Botox was approved 03/04/2020 through 03/03/2021. Semaj Rueda #[id number] given and will also be faxing approval to office. Will scan in once received and patient scheduled for 12/03/2020 for Botox injections.

## 2020-12-03 ENCOUNTER — OFFICE VISIT (OUTPATIENT)
Dept: FAMILY MEDICINE CLINIC | Age: 55
End: 2020-12-03
Payer: MEDICARE

## 2020-12-03 ENCOUNTER — OFFICE VISIT (OUTPATIENT)
Dept: PHYSICAL MEDICINE AND REHAB | Age: 55
End: 2020-12-03
Payer: MEDICARE

## 2020-12-03 VITALS
BODY MASS INDEX: 24.35 KG/M2 | HEIGHT: 61 IN | WEIGHT: 129 LBS | SYSTOLIC BLOOD PRESSURE: 120 MMHG | DIASTOLIC BLOOD PRESSURE: 82 MMHG

## 2020-12-03 VITALS
BODY MASS INDEX: 24.35 KG/M2 | RESPIRATION RATE: 16 BRPM | HEIGHT: 61 IN | HEART RATE: 62 BPM | DIASTOLIC BLOOD PRESSURE: 100 MMHG | SYSTOLIC BLOOD PRESSURE: 156 MMHG | WEIGHT: 129 LBS | TEMPERATURE: 97.9 F

## 2020-12-03 PROCEDURE — G8420 CALC BMI NORM PARAMETERS: HCPCS | Performed by: PHYSICAL MEDICINE & REHABILITATION

## 2020-12-03 PROCEDURE — 1036F TOBACCO NON-USER: CPT | Performed by: NURSE PRACTITIONER

## 2020-12-03 PROCEDURE — 64615 CHEMODENERV MUSC MIGRAINE: CPT | Performed by: PHYSICAL MEDICINE & REHABILITATION

## 2020-12-03 PROCEDURE — G8420 CALC BMI NORM PARAMETERS: HCPCS | Performed by: NURSE PRACTITIONER

## 2020-12-03 PROCEDURE — 3017F COLORECTAL CA SCREEN DOC REV: CPT | Performed by: NURSE PRACTITIONER

## 2020-12-03 PROCEDURE — G8484 FLU IMMUNIZE NO ADMIN: HCPCS | Performed by: NURSE PRACTITIONER

## 2020-12-03 PROCEDURE — 1036F TOBACCO NON-USER: CPT | Performed by: PHYSICAL MEDICINE & REHABILITATION

## 2020-12-03 PROCEDURE — G8427 DOCREV CUR MEDS BY ELIG CLIN: HCPCS | Performed by: NURSE PRACTITIONER

## 2020-12-03 PROCEDURE — G8484 FLU IMMUNIZE NO ADMIN: HCPCS | Performed by: PHYSICAL MEDICINE & REHABILITATION

## 2020-12-03 PROCEDURE — 99213 OFFICE O/P EST LOW 20 MIN: CPT | Performed by: PHYSICAL MEDICINE & REHABILITATION

## 2020-12-03 PROCEDURE — 99214 OFFICE O/P EST MOD 30 MIN: CPT | Performed by: NURSE PRACTITIONER

## 2020-12-03 PROCEDURE — 3017F COLORECTAL CA SCREEN DOC REV: CPT | Performed by: PHYSICAL MEDICINE & REHABILITATION

## 2020-12-03 PROCEDURE — G8427 DOCREV CUR MEDS BY ELIG CLIN: HCPCS | Performed by: PHYSICAL MEDICINE & REHABILITATION

## 2020-12-03 RX ORDER — LISINOPRIL 20 MG/1
20 TABLET ORAL DAILY
Qty: 30 TABLET | Refills: 5 | Status: SHIPPED | OUTPATIENT
Start: 2020-12-03 | End: 2022-03-07

## 2020-12-03 RX ORDER — CYCLOBENZAPRINE HCL 10 MG
10 TABLET ORAL NIGHTLY PRN
Qty: 30 TABLET | Refills: 1 | Status: SHIPPED | OUTPATIENT
Start: 2020-12-03 | End: 2022-06-23 | Stop reason: SDUPTHER

## 2020-12-03 RX ORDER — DIAZEPAM 5 MG/1
5 TABLET ORAL EVERY 8 HOURS PRN
Qty: 90 TABLET | Refills: 0 | Status: SHIPPED | OUTPATIENT
Start: 2020-12-03 | End: 2021-03-16

## 2020-12-03 RX ORDER — MONTELUKAST SODIUM 10 MG/1
10 TABLET ORAL NIGHTLY
Qty: 30 TABLET | Refills: 5 | Status: SHIPPED | OUTPATIENT
Start: 2020-12-03 | End: 2021-05-11

## 2020-12-03 RX ORDER — ALBUTEROL SULFATE 90 UG/1
2 AEROSOL, METERED RESPIRATORY (INHALATION) EVERY 6 HOURS PRN
Qty: 1 INHALER | Refills: 5 | Status: SHIPPED | OUTPATIENT
Start: 2020-12-03 | End: 2022-06-23 | Stop reason: SDUPTHER

## 2020-12-03 NOTE — PROGRESS NOTES
FOLLOW UP APPOINTMENT:         Queenie Rodriguez MD    12/3/2020       Kinjal Esteban is a 50 y.o. female, who came for a  botox injection for headache management    Cause of the symptom(s):  Chronic migraine headaches     Current pain level: 8 on the scale of 0-10 ( 10 being worst)     Quality of Symptoms: aching and throbbing    Aggravating factors: activity    Relieving factors: rest and lying down    Allergies   Allergen Reactions    Abilify [Aripiprazole] Other (See Comments)     Vivid dreams    Cephalexin Nausea And Vomiting    Codeine Nausea And Vomiting    Excedrin Migraine [Aspirin-Acetaminophen-Caffeine] Nausea And Vomiting    Neurontin [Gabapentin] Anxiety    Nucynta [Tapentadol Hcl Er] Other (See Comments)     Terrible headache    Percocet [Oxycodone-Acetaminophen] Nausea And Vomiting    Tramadol Nausea And Vomiting    Tylenol With Codeine #3 [Acetaminophen-Codeine]      Nausea     Vicodin [Hydrocodone-Acetaminophen] Nausea And Vomiting       Social History     Socioeconomic History    Marital status:      Spouse name: Not on file    Number of children: Not on file    Years of education: Not on file    Highest education level: Not on file   Occupational History    Not on file   Social Needs    Financial resource strain: Not on file    Food insecurity     Worry: Not on file     Inability: Not on file    Transportation needs     Medical: Not on file     Non-medical: Not on file   Tobacco Use    Smoking status: Former Smoker     Types: Cigarettes     Last attempt to quit:      Years since quittin.9    Smokeless tobacco: Never Used    Tobacco comment: 1 pack per week intermit for 20 yrs   Substance and Sexual Activity    Alcohol use: No     Alcohol/week: 0.0 standard drinks    Drug use: No    Sexual activity: Not on file   Lifestyle    Physical activity     Days per week: Not on file     Minutes per session: Not on file    Stress: Not on file   Relationships    Social connections     Talks on phone: Not on file     Gets together: Not on file     Attends Roman Catholic service: Not on file     Active member of club or organization: Not on file     Attends meetings of clubs or organizations: Not on file     Relationship status: Not on file    Intimate partner violence     Fear of current or ex partner: Not on file     Emotionally abused: Not on file     Physically abused: Not on file     Forced sexual activity: Not on file   Other Topics Concern    Not on file   Social History Narrative    Not on file       Past Medical History:   Diagnosis Date    Anxiety     Asthma     [facility] [address]     Bipolar 1 disorder ([facility] [address]     Blood in urine     Bronchitis     Cataracts, bilateral     Degenerative cervical disc     Degenerative disc disease     Depression     Frequent headaches     Hypertension     Hypothyroidism     Interstitial cystitis     Iritis 2013    OU      Migraines     Osteopenia     PONV (postoperative nausea and vomiting)     Shortness of breath     Thyroid disease        Past Surgical History:   Procedure Laterality Date    BACK SURGERY      multiple injections and nerve blocks at cervical and lumbar    CERVICAL FUSION  11/09    c 4,5,6    CERVICAL FUSION  11/20/2015    CYSTOSCOPY N/A 4/25/2019    CYSTOSCOPY WITH HYDRODISTENTION WITH INSTILLATION OF DMSO AND MARCAINE performed by Betsy Stephens MD at [address] [place] [address] (W OR W/O BIOPSY)  2011    HC PAIN BLOCK Bilateral 11/6/2019    Third occipital nerve block bilateral. Left trigger point trapezius performed by Tomer Land MD at [address] [facility]  4/02    [address] [place] [address] N/A 11/20/2019    STAGE 1 / INSERTION OF INTERSTIM DEVICE performed by Betsy Stephens MD at [facility] 12/4/2019    STAGE II STIMULATOR INSERTION performed by jelly Apply 2-3 times daily Yes Mayra Mcdonnell, APRN - CNP       Review of Systems : All systems reviewed, all unremarkable other than HPI/subjective. No change since last visit. Denies  fever, chills, infection or non healing wound. /82   Ht 5' 1\" (1.549 m)   Wt 129 lb (58.5 kg)   BMI 24.37 kg/m²       Physical Exam  Constitutional:       General: She is not in acute distress. HENT:      Head: Atraumatic. Neck:      Musculoskeletal: Neck supple. Pulmonary:      Effort: Pulmonary effort is normal. No respiratory distress. Skin:     General: Skin is dry. Neurological:      Mental Status: She is alert and oriented to person, place, and time. Motor: No weakness. Psychiatric:         Behavior: Behavior normal.         Assessment and Plan:      Diagnosis Orders   1. Intractable chronic migraine without aura and with status migrainosus           Risks benefits alternatives plan and personnel were discussed. Patient agreed to proceed. Procedure: botox injection for headache management    Sterile technique was done in the usual manner using alcohol swab    Using a 30 g, 0.5 inch needle, a total of 155 units of botox reconstituted at 100 units to 2 ml preservative free NSS, and was injected to the following muscles.  10 units divided in 2 sites. Procerus 5 units in 1 site. Frontalis 20 units between 4 sites. Temporalis 40 units in 8 sites. Occipitalis 30 units in 6 sites. Cervical paraspinals 20 units in 4 sites. Trapezius 30 units in 6 sites    Waste: 45 units. All questions were answered and imaging studies reviewed with the patient.      Electronically signed by Julia Ribeiro MD on 12/3/2020 at 3:24 PM

## 2020-12-03 NOTE — PATIENT INSTRUCTIONS
Patient Education        biotin (oral)  Pronunciation:  BYE oh tin  Brand:  Appearex, Cyto B7, Hair, Skin & Nails  What is the most important information I should know about biotin? Follow all directions on the product label and package. Tell each of your healthcare providers about all your medical conditions, allergies, and all medicines you use. What is biotin? Biotin is a form of vitamin B found in foods. Biotin helps the body break down fats, carbohydrates, and proteins. Biotin has been used in alternative medicine as a likely effective aid in treating or preventing biotin deficiency. Biotin deficiency can be caused by malnutrition, rapid weight loss, long-term tube feeding, and other medical conditions. Biotin has also been used to treat seborrhea (skin rash) in babies. However, research has shown that biotin may not be effective in treating this condition. Other uses not proven with research have included treating brittle nails or thinning hair, diabetes, nerve pain, and other conditions. It is not certain whether biotin is effective in treating any medical condition. Medicinal use of this product has not been approved by the FDA. Biotin should not be used in place of medication prescribed for you by your doctor. Biotin is often sold as an herbal supplement. There are no regulated manufacturing standards in place for many herbal compounds and some marketed supplements have been found to be contaminated with toxic metals or other drugs. Herbal/health supplements should be purchased from a reliable source to minimize the risk of contamination. Biotin may also be used for purposes not listed in this product guide. What should I discuss with my healthcare provider before taking biotin? You should not use this product if you are allergic to biotin. Before using biotin, talk to your healthcare provider.  Your dose needs may be different:  · if you have kidney disease;  · if you have had stomach surgery; or  · if you smoke. Ask a doctor before using biotin if you are pregnant or breastfeeding. Your dose needs may be different during pregnancy or while you are nursing. Do not give any herbal/health supplement to a child without medical advice. How should I take biotin? When considering the use of herbal supplements, seek the advice of your doctor. You may also consider consulting a practitioner who is trained in the use of herbal/health supplements. If you choose to use biotin, use it as directed on the package or as directed by your doctor, pharmacist, or other healthcare provider. Do not use more of this product than is recommended on the label. Measure liquid medicine carefully. Use the dosing syringe provided, or use a medicine dose-measuring device (not a kitchen spoon). Biotin can cause false results with certain medical tests. Tell any doctor who treats you that you are using biotin. The recommended dietary allowance of biotin increases with age. Follow your healthcare provider's instructions. You may also consult the Office of Dietary Supplements of the [address] [place] [address]. Department of Agriculture (MIOX) [employer] Database (formerly \"Recommended Daily Allowances\") listings for more information. It may take 3 to 6 months before the condition of your hair or nails improves. Call your doctor if the condition you are treating with biotin does not improve, or if it gets worse while using this product. After you stop using biotin, your nails will likely return to their original condition within 6 to 9 months. Store at room temperature away from moisture and heat. What happens if I miss a dose? Skip the missed dose if it is almost time for your next scheduled dose. Do not use extra biotin to make up the missed dose. What happens if I overdose? Since biotin is a water-soluble vitamin, an overdose is unlikely to occur. What should I avoid while taking biotin?   Follow your healthcare provider's instructions about any restrictions on food, beverages, or activity. What are the possible side effects of biotin? Get emergency medical help if you have signs of an allergic reaction: hives; difficult breathing; swelling of your face, lips, tongue, or throat. Common side effects may include upset stomach or diarrhea. This is not a complete list of side effects and others may occur. Call your doctor for medical advice about side effects. You may report side effects to FDA at 1-979-CPU-2126. What other drugs will affect biotin? Taking certain medicines can lower your blood levels of biotin, which could affect your biotin dose needs. Ask a doctor or pharmacist before using biotin with any other medications, especially:  · carbamazepine;  · phenobarbital;  · phenytoin; or  · primidone. This list is not complete. Other drugs may affect biotin, including prescription and over-the-counter medicines, vitamins, and herbal products. Not all possible drug interactions are listed here. Where can I get more information? Consult with a licensed healthcare professional before using any herbal/health supplement. Whether you are treated by a medical doctor or a practitioner trained in the use of natural medicines/supplements, make sure all your healthcare providers know about all of your medical conditions and treatments. Remember, keep this and all other medicines out of the reach of children, never share your medicines with others, and use this medication only for the indication prescribed. Every effort has been made to ensure that the information provided by [address] is accurate, up-to-date, and complete, but no guarantee is made to that effect. Drug information contained herein may be time sensitive.  [facility] information has been compiled for use by healthcare practitioners and consumers in the [place] and therefore [facility] does not warrant that uses outside of the [place] are appropriate, unless specifically indicated otherwise. [facility] drug information does not endorse drugs, diagnose patients or recommend therapy. [facility]'s drug information is an informational resource designed to assist licensed healthcare practitioners in caring for their patients and/or to serve consumers viewing this service as a supplement to, and not a substitute for, the expertise, skill, knowledge and judgment of healthcare practitioners. The absence of a warning for a given drug or drug combination in no way should be construed to indicate that the drug or drug combination is safe, effective or appropriate for any given patient. [facility] does not assume any responsibility for any aspect of healthcare administered with the aid of information [facility] provides. The information contained herein is not intended to cover all possible uses, directions, precautions, warnings, drug interactions, allergic reactions, or adverse effects. If you have questions about the drugs you are taking, check with your doctor, nurse or pharmacist.  Copyright 0797-9428 [address] [place] [address] Dr OLIVA. Version: 4.02. Revision date: 12/6/2019. Care instructions adapted under license by [facility] ([facility]). If you have questions about a medical condition or this instruction, always ask your healthcare professional. [place] [address] any warranty or liability for your use of this information. Patient Education        Grief (Actual/Anticipated): Care Instructions  Your Care Instructions     Grief is your emotional reaction to a major loss. The words \"sorrow\" and \"heartache\" often are used to describe feelings of grief. You feel grief when you lose a beloved person, pet, place, or thing. It is also natural to feel grief when you lose a valued way of life, such as a job, marriage, or good health. You may begin to grieve before a loss occurs. You may grieve for a loved one who is sick and dying.  Children and adults often feel the pain of loss before a big move or divorce. This type of grief helps you get ready for a loss. Grief is different for each person. There is no \"normal\" or \"expected\" period of time for grieving. Some people adjust to their loss within a couple of months. Others may take 2 years or longer, especially if their lives were changed a lot or if the loss was sudden and shocking. Grieving can cause problems such as headaches, loss of appetite, and trouble with thinking or sleeping. You may withdraw from friends and family and behave in ways that are unusual for you. Grief may cause you to question your beliefs and views about life. Grief is natural and does not require medical treatment. But if you have trouble sleeping, it may help to take sleeping pills for a short time. It may help to talk with people who have been through or are going through similar losses. You may also want to talk to a counselor about your feelings. Talking about your loss, sharing your cares and concerns, and getting support from others are important parts of healthy grieving. Follow-up care is a key part of your treatment and safety. Be sure to make and go to all appointments, and call your doctor if you are having problems. It's also a good idea to know your test results and keep a list of the medicines you take. How can you care for yourself at home? · Get enough sleep. Your mind helps make sense of your life while you sleep. Missing sleep can lead to illness and make it harder for you to deal with your grief. · Eat healthy foods. Try to avoid eating only foods that give you comfort. Ask someone to join you for a meal if you do not like eating alone. Consider taking a multivitamin every day. · Get some exercise every day. Even a walk can help you deal with your grief. Other exercises, such as yoga, can also help you manage stress. · Comfort yourself.  Take time to look at photos or use special items that make you feel better. · Stay involved in your life. Do not withdraw from the activities you enjoy. People you know at work, Shinto, clubs, or other groups can help you get through your period of grief. · Think about joining a support group to help you deal with your grief. There are many support groups to help people recover from grief. When should you call for help? Call 911 anytime you think you may need emergency care. For example, call if:    · You feel you cannot stop from hurting yourself or someone else. Watch closely for changes in your health, and be sure to contact your doctor if:    · You think you may be depressed.     · You do not get better as expected. Where can you learn more? Go to https://[email or web address]. org and sign in to your [employer] account. Enter H249 in the LOG607 box to learn more about \"Grief (Actual/Anticipated): Care Instructions. \"     If you do not have an account, please click on the \"Sign Up Now\" link. Current as of: December 9, 2019               Content Version: 12.6  © 8308-7195 [employer]. Care instructions adapted under license by [facility] ([facility]). If you have questions about a medical condition or this instruction, always ask your healthcare professional. [address] any warranty or liability for your use of this information.

## 2020-12-03 NOTE — PATIENT INSTRUCTIONS
Patient tolerated the procedure well. Advised not to apply heat, or massage the areas injected. Call if she has concerns. FF up in 2 weeks.

## 2020-12-04 ASSESSMENT — ENCOUNTER SYMPTOMS
CONSTIPATION: 1
EYES NEGATIVE: 1
BACK PAIN: 1
RESPIRATORY NEGATIVE: 1

## 2020-12-04 NOTE — PROGRESS NOTES
[address] [place] [address]  Dept: [phone]  Dept Fax: [phone]  Loc: [phone]  PROGRESS NOTE      VisitDate: 12/3/2020    Robert Light is a 50 y.o. female who presents today for:     Chief Complaint   Patient presents with    Alopecia     Noticed it getting worse over the past month.  Depression     Needs a recommendation for counseling. She lost her father to Covid 2 months ago. Her son moved away and she cannot see her granddaughter now. Subjective:  Patient presents with multiple complaints. Requesting refills. Currently out of blood pressure medication. History of anxiety/depression, bipolar disorder, asthma, chronic neck and back pain, headaches, hypertension, chronic constipation, hypothyroidism, IC and hypothyroidism. Reports that father passed away approximately 2 months ago due to Covid. She also reports that she has been  from her children and grandchildren for some time. Complains of increased anxiety/depression. Patient also complains of worsening hair loss. Reports taking  medications as prescribed. Denies any homicidal or suicidal ideations. Patient requesting consultation with counselor regarding grief and family matters. Review of Systems   Constitutional: Positive for fatigue. Negative for fever. HENT: Negative. Eyes: Negative. Respiratory: Negative. Cardiovascular: Negative. Gastrointestinal: Positive for constipation. Endocrine: Negative. Genitourinary: Negative. Musculoskeletal: Positive for arthralgias, back pain, myalgias, neck pain and neck stiffness. Neurological: Positive for headaches. Hematological: Negative. Psychiatric/Behavioral: Positive for decreased concentration, dysphoric mood and sleep disturbance. Negative for hallucinations, self-injury and suicidal ideas. The patient is nervous/anxious.     All other systems reviewed and are negative.     Past Medical History:   Diagnosis Date    Anxiety     Asthma     Autoimmune disease ([facility]     Bipolar 1 disorder ([facility]     Blood in urine     Bronchitis     Cataracts, bilateral     Degenerative cervical disc     Degenerative disc disease     Depression     Frequent headaches     Hypertension     Hypothyroidism     Interstitial cystitis     Iritis 2013    OU      Migraines     Osteopenia     PONV (postoperative nausea and vomiting)     Shortness of breath     Thyroid disease       Past Surgical History:   Procedure Laterality Date    BACK SURGERY      multiple injections and nerve blocks at cervical and lumbar    CERVICAL FUSION  11/09    c 4,5,6    CERVICAL FUSION  11/20/2015    CYSTOSCOPY N/A 4/25/2019    CYSTOSCOPY WITH HYDRODISTENTION WITH INSTILLATION OF DMSO AND MARCAINE performed by Ramonita León MD at [address] [place] [address] (W OR W/O BIOPSY)  2011    HC PAIN BLOCK Bilateral 11/6/2019    Third occipital nerve block bilateral. Left trigger point trapezius performed by Tawanna Engle MD at [address]  4/02    [address] [place] [address] N/A 11/20/2019    STAGE 1 / INSERTION OF INTERSTIM DEVICE performed by Ramonita León MD at [facility] N/A 12/4/2019    STAGE II STIMULATOR INSERTION performed by Ramonita León MD at [facility] History   Problem Relation Age of Onset    Rheum Arthritis Mother     Thyroid Disease Mother     Colon Polyps Mother         esophageal polyps    Other Mother     High Blood Pressure Father     Diabetes Father     High Blood Pressure Sister     Thyroid Disease Brother     High Blood Pressure Brother     Liver Cancer Maternal Uncle     Cancer Maternal Grandfather         pancreatic cancer    Colon Cancer Neg Hx      Social History     Tobacco Use    Smoking status: Former Smoker     Types: Cigarettes     Last attempt to quit:      Years since quittin.9    Smokeless tobacco: Never Used    Tobacco comment: 1 pack per week intermit for 20 yrs   Substance Use Topics    Alcohol use: No     Alcohol/week: 0.0 standard drinks      Current Outpatient Medications   Medication Sig Dispense Refill    albuterol sulfate HFA (PROAIR HFA) 108 (90 Base) MCG/ACT inhaler Inhale 2 puffs into the lungs every 6 hours as needed for Wheezing 1 Inhaler 5    montelukast (SINGULAIR) 10 MG tablet Take 1 tablet by mouth nightly 30 tablet 5    cyclobenzaprine (FLEXERIL) 10 MG tablet Take 1 tablet by mouth nightly as needed for Muscle spasms 30 tablet 1    fluticasone-salmeterol (ADVAIR DISKUS) 100-50 MCG/DOSE diskus inhaler Inhale 1 puff into the lungs every 12 hours 60 each 5    diazePAM (VALIUM) 5 MG tablet Take 1 tablet by mouth every 8 hours as needed for Anxiety or Sleep (spasms). 90 tablet 0    lisinopril (PRINIVIL;ZESTRIL) 20 MG tablet Take 1 tablet by mouth daily 30 tablet 5    lamoTRIgine (LAMICTAL) 200 MG tablet Take 1 tablet by mouth daily 90 tablet 0    buPROPion (WELLBUTRIN XL) 300 MG extended release tablet Take 1 tablet by mouth every morning 90 tablet 0    levothyroxine (SYNTHROID) 88 MCG tablet Take 1 tablet by mouth daily 90 tablet 2    neomycin-polymyxin-dexameth (MAXITROL) 3.5-16898-0.1 ophthalmic suspension Place 1 drop into both eyes As needed for dry eyes      hydrocortisone 2.5 % cream Apply topically 2 times daily. 1 Tube 1    lidocaine (XYLOCAINE) 2 % jelly Apply 2-3 times daily 1 Tube 3     No current facility-administered medications for this visit.       Allergies   Allergen Reactions    Abilify [Aripiprazole] Other (See Comments)     Vivid dreams    Cephalexin Nausea And Vomiting    Codeine Nausea And Vomiting    Excedrin Migraine [Aspirin-Acetaminophen-Caffeine] Nausea And Vomiting    Neurontin [Gabapentin] Anxiety    Nucynta [Tapentadol Hcl Er] Other (See Comments)     Terrible headache    Percocet [Oxycodone-Acetaminophen] Nausea And Vomiting    Tramadol Nausea And Vomiting    Tylenol With Codeine #3 [Acetaminophen-Codeine]      Nausea     Vicodin [Hydrocodone-Acetaminophen] Nausea And Vomiting     Health Maintenance   Topic Date Due    DTaP/Tdap/Td vaccine (1 - Tdap) 06/27/1986    Cervical cancer screen  06/27/1988    Shingles Vaccine (1 of 2) 06/27/2017    A1C test (Diabetic or Prediabetic)  12/12/2017    Breast cancer screen  10/05/2019    Annual Wellness Visit (AWV)  04/22/2020    Flu vaccine (1) 09/01/2020    TSH testing  08/10/2021    Potassium monitoring  08/10/2021    Creatinine monitoring  08/10/2021    Lipid screen  08/10/2025    Colon cancer screen colonoscopy  08/06/2030    HIV screen  Completed    Hepatitis A vaccine  Aged Out    Hepatitis B vaccine  Aged Out    Hib vaccine  Aged Out    Meningococcal (ACWY) vaccine  Aged Out    Pneumococcal 0-64 years Vaccine  Aged Out         Objective:     Physical Exam  Vitals signs and nursing note reviewed. Constitutional:       Appearance: Normal appearance. She is normal weight. HENT:      Head: Normocephalic. Nose: Nose normal.      Mouth/Throat:      Pharynx: Oropharynx is clear. Eyes:      Conjunctiva/sclera: Conjunctivae normal.   Neck:      Musculoskeletal: Neck rigidity and muscular tenderness present. Cardiovascular:      Rate and Rhythm: Normal rate and regular rhythm. Pulses: Normal pulses. Heart sounds: Normal heart sounds. Pulmonary:      Effort: Pulmonary effort is normal.      Breath sounds: Normal breath sounds. Abdominal:      General: Bowel sounds are normal.      Palpations: Abdomen is soft. Skin:     General: Skin is warm. Neurological:      General: No focal deficit present. Mental Status: She is alert and oriented to person, place, and time.    Psychiatric:         Attention and Perception: Attention normal.         Mood and Affect: Mood is anxious and depressed. Speech: Speech is delayed. Behavior: Behavior is slowed. Behavior is cooperative. Thought Content: Thought content normal.         Cognition and Memory: Cognition normal.         Judgment: Judgment normal.       BP (!) 156/100   Pulse 62   Temp 97.9 °F (36.6 °C) (Temporal)   Resp 16   Ht 5' 1\" (1.549 m)   Wt 129 lb (58.5 kg)   BMI 24.37 kg/m²       Impression/Plan:  1. Recurrent major depressive disorder, in partial remission ([facility]    2. History of fusion of cervical spine    3. Cervicogenic headache    4. Failed neck syndrome    5. Grief    6. Mild intermittent reactive airway disease without complication    7. Hair loss    8. Hypothyroidism, unspecified type    9. Essential hypertension      Requested Prescriptions     Signed Prescriptions Disp Refills    albuterol sulfate HFA (PROAIR HFA) 108 (90 Base) MCG/ACT inhaler 1 Inhaler 5     Sig: Inhale 2 puffs into the lungs every 6 hours as needed for Wheezing    montelukast (SINGULAIR) 10 MG tablet 30 tablet 5     Sig: Take 1 tablet by mouth nightly    cyclobenzaprine (FLEXERIL) 10 MG tablet 30 tablet 1     Sig: Take 1 tablet by mouth nightly as needed for Muscle spasms    fluticasone-salmeterol (ADVAIR DISKUS) 100-50 MCG/DOSE diskus inhaler 60 each 5     Sig: Inhale 1 puff into the lungs every 12 hours    diazePAM (VALIUM) 5 MG tablet 90 tablet 0     Sig: Take 1 tablet by mouth every 8 hours as needed for Anxiety or Sleep (spasms).     lisinopril (PRINIVIL;ZESTRIL) 20 MG tablet 30 tablet 5     Sig: Take 1 tablet by mouth daily     Orders Placed This Encounter   Procedures    TSH without Reflex     Standing Status:   Future     Standing Expiration Date:   12/3/2021    T4, Free     Standing Status:   Future     Standing Expiration Date:   12/3/2021    CBC Auto Differential     Standing Status:   Future     Standing Expiration Date:   12/3/2021    Basic Metabolic Panel     Standing Status: Future     Standing Expiration Date:   12/3/2021    Vitamin B12 & Folate     Standing Status:   Future     Standing Expiration Date:   12/3/2021    Iron and TIBC     Standing Status:   Future     Standing Expiration Date:   12/3/2021     Order Specific Question:   Is Patient Fasting? Answer:   yes     Order Specific Question:   No of Hours? Answer:   [address] [facility], [address] [place] [address], [address] [place] [address], [facility], [facility]     Referral Priority:   Routine     Referral Type:   Eval and Treat     Referral Reason:   Specialty Services Required     Referred to Provider:   Leonardo Tam PSYD     Requested Specialty:   Psychology     Number of Visits Requested:   1       Patient giveneducational materials - see patient instructions. Discussed use, benefit, and side effects of prescribed medications. All patient questions answered. Pt voiced understanding. Reviewed health maintenance. Patient agreedwith treatment plan. Follow up as directed. Consultation with psych. Routine labs. Refills provided. Valium refilled. OARRS report reviewed. Continue medications as prescribed. Educational information on above diagnosis  provided per AVS.  25 minutes. Biotin over-the-counter. Continue consultation with psychiatry. Follow-up in 2 months/pending labs. Hypertension information provided. Monitor blood pressure keep log book take medications as prescribed routinely.     Electronically signed by DEYANIRA Hurt CNP on 12/4/2020 at 7:39 AM

## 2021-01-21 ENCOUNTER — OFFICE VISIT (OUTPATIENT)
Dept: PHYSICAL MEDICINE AND REHAB | Age: 56
End: 2021-01-21
Payer: MEDICARE

## 2021-01-21 VITALS
SYSTOLIC BLOOD PRESSURE: 122 MMHG | BODY MASS INDEX: 24.35 KG/M2 | HEIGHT: 61 IN | WEIGHT: 129 LBS | DIASTOLIC BLOOD PRESSURE: 80 MMHG

## 2021-01-21 DIAGNOSIS — G43.711 INTRACTABLE CHRONIC MIGRAINE WITHOUT AURA AND WITH STATUS MIGRAINOSUS: Primary | ICD-10-CM

## 2021-01-21 PROCEDURE — G8427 DOCREV CUR MEDS BY ELIG CLIN: HCPCS | Performed by: PHYSICAL MEDICINE & REHABILITATION

## 2021-01-21 PROCEDURE — G8484 FLU IMMUNIZE NO ADMIN: HCPCS | Performed by: PHYSICAL MEDICINE & REHABILITATION

## 2021-01-21 PROCEDURE — 99213 OFFICE O/P EST LOW 20 MIN: CPT | Performed by: PHYSICAL MEDICINE & REHABILITATION

## 2021-01-21 PROCEDURE — 1036F TOBACCO NON-USER: CPT | Performed by: PHYSICAL MEDICINE & REHABILITATION

## 2021-01-21 PROCEDURE — 3017F COLORECTAL CA SCREEN DOC REV: CPT | Performed by: PHYSICAL MEDICINE & REHABILITATION

## 2021-01-21 PROCEDURE — G8420 CALC BMI NORM PARAMETERS: HCPCS | Performed by: PHYSICAL MEDICINE & REHABILITATION

## 2021-01-21 NOTE — PROGRESS NOTES
FOLLOW UP APPOINTMENT:         Efrain Price MD    2021       Sonido Francisco is a 50 y.o. female, who came for a  follow up to discuss treatment options    History of chronic headaches. No relief from the botox injection.      Came today to discuss other treatment      Prior reatment done: physical therapy, injection, anti-inflammatory medication, muscle relaxant and steroid    Current pain level: 6 on the scale of 0-10 ( 10 being worst)     Quality of Symptoms: aching and throbbing    Aggravating factors: activity    Relieving factors: rest        Allergies   Allergen Reactions    Abilify [Aripiprazole] Other (See Comments)     Vivid dreams    Cephalexin Nausea And Vomiting    Codeine Nausea And Vomiting    Excedrin Migraine [Aspirin-Acetaminophen-Caffeine] Nausea And Vomiting    Neurontin [Gabapentin] Anxiety    Nucynta [Tapentadol Hcl Er] Other (See Comments)     Terrible headache    Percocet [Oxycodone-Acetaminophen] Nausea And Vomiting    Tramadol Nausea And Vomiting    Tylenol With Codeine #3 [Acetaminophen-Codeine]      Nausea     Vicodin [Hydrocodone-Acetaminophen] Nausea And Vomiting       Social History     Socioeconomic History    Marital status:      Spouse name: Not on file    Number of children: Not on file    Years of education: Not on file    Highest education level: Not on file   Occupational History    Not on file   Social Needs    Financial resource strain: Not on file    Food insecurity     Worry: Not on file     Inability: Not on file    Transportation needs     Medical: Not on file     Non-medical: Not on file   Tobacco Use    Smoking status: Former Smoker     Types: Cigarettes     Quit date:      Years since quittin.0    Smokeless tobacco: Never Used    Tobacco comment: 1 pack per week intermit for 20 yrs   Substance and Sexual Activity    Alcohol use: No     Alcohol/week: 0.0 standard drinks    Drug use: No    Sexual activity: Not on file   Lifestyle    Physical activity     Days per week: Not on file     Minutes per session: Not on file    Stress: Not on file   Relationships    Social connections     Talks on phone: Not on file     Gets together: Not on file     Attends Buddhist service: Not on file     Active member of club or organization: Not on file     Attends meetings of clubs or organizations: Not on file     Relationship status: Not on file    Intimate partner violence     Fear of current or ex partner: Not on file     Emotionally abused: Not on file     Physically abused: Not on file     Forced sexual activity: Not on file   Other Topics Concern    Not on file   Social History Narrative    Not on file       Past Medical History:   Diagnosis Date    Anxiety     Asthma     [facility]     Bipolar 1 disorder ([facility]     Blood in urine     Bronchitis     Cataracts, bilateral     Degenerative cervical disc     Degenerative disc disease     Depression     Frequent headaches     Hypertension     Hypothyroidism     Interstitial cystitis     Iritis 2013    OU      Migraines     Osteopenia     PONV (postoperative nausea and vomiting)     Shortness of breath     Thyroid disease        Past Surgical History:   Procedure Laterality Date    BACK SURGERY      multiple injections and nerve blocks at cervical and lumbar    CERVICAL FUSION  11/09    c 4,5,6    CERVICAL FUSION  11/20/2015    CYSTOSCOPY N/A 4/25/2019    CYSTOSCOPY WITH HYDRODISTENTION WITH INSTILLATION OF DMSO AND MARCAINE performed by Rajan Perez MD at [address] [place] [address] (W OR W/O BIOPSY)  2011    HC PAIN BLOCK Bilateral 11/6/2019    Third occipital nerve block bilateral. Left trigger point trapezius performed by Celina La MD at [address] [facility]  4/02    INCONTINENCE SURGERY      SPINAL CORD STIMULATOR IMPLANTATION N/A 11/20/2019    STAGE 1 / INSERTION OF Brian Garcia eyes Yes Historical Provider, MD   hydrocortisone 2.5 % cream Apply topically 2 times daily. Yes DEYANIRA Morgan CNP   lidocaine (XYLOCAINE) 2 % jelly Apply 2-3 times daily Yes DEYANIRA Morgan CNP       Review of Systems : All systems reviewed, all unremarkable other than HPI/subjective. No change since last visit. Denies  fever, chills, infection or non healing wound. /80   Ht 5' 1\" (1.549 m)   Wt 129 lb (58.5 kg)   BMI 24.37 kg/m²       Physical Exam  Constitutional:       General: She is not in acute distress. HENT:      Head: Atraumatic. Neck:      Musculoskeletal: Neck supple. Pulmonary:      Effort: Pulmonary effort is normal. No respiratory distress. Neurological:      Mental Status: She is alert and oriented to person, place, and time. Psychiatric:         Behavior: Behavior normal.       Assessment and Plan:      Diagnosis Orders   1. Intractable chronic migraine without aura and with status migrainosus       No relief from the botox injection. FF up as needed    All questions were answered and imaging studies reviewed with the patient. I have reviewed the chief complaint and HPI including the [facility] and Vital documentation by my staff and I agree with their documentation and have added where applicable. Time spent with patient was  15 minutes. More than 50% was spent counseling/coordinating the patient's care.          Edilia Quan MD   Spine Medicine/PM&R

## 2021-02-17 ENCOUNTER — VIRTUAL VISIT (OUTPATIENT)
Dept: PSYCHIATRY | Age: 56
End: 2021-02-17
Payer: MEDICARE

## 2021-02-17 DIAGNOSIS — F31.81 BIPOLAR II DISORDER (HCC): Primary | ICD-10-CM

## 2021-02-17 PROCEDURE — 99212 OFFICE O/P EST SF 10 MIN: CPT | Performed by: NURSE PRACTITIONER

## 2021-02-17 RX ORDER — LAMOTRIGINE 200 MG/1
200 TABLET ORAL DAILY
Qty: 90 TABLET | Refills: 0 | Status: SHIPPED | OUTPATIENT
Start: 2021-02-17 | End: 2021-05-20 | Stop reason: SDUPTHER

## 2021-02-17 RX ORDER — BUPROPION HYDROCHLORIDE 300 MG/1
300 TABLET ORAL EVERY MORNING
Qty: 90 TABLET | Refills: 0 | Status: SHIPPED | OUTPATIENT
Start: 2021-02-17 | End: 2021-05-20 | Stop reason: SDUPTHER

## 2021-02-17 NOTE — PROGRESS NOTES
Progress Note     Maricarmen Baird is a 50 y.o. female being evaluated by a Virtual Visit (video visit) encounter to address concerns as mentioned above. A caregiver was present when appropriate. Due to this being a TeleHealth encounter (During BQWBD-56 public health emergency), evaluation of the following organ systems was limited: Vitals/Constitutional/EENT/Resp/CV/GI//MS/Neuro/Skin/Heme-Lymph-Imm. Pursuant to the emergency declaration under the [address] [place] [address] and the [employer] Act, this Virtual Visit was conducted with patient's (and/or legal guardian's) consent, to reduce the patient's risk of exposure to COVID-19 and provide necessary medical care. The patient (and/or legal guardian) has also been advised to contact this office for worsening conditions or problems, and seek emergency medical treatment and/or call 911 if deemed necessary. Patient identification was verified at the start of the visit: Yes    Total time spent for this encounter: MDM    Services were provided through a video synchronous discussion virtually to substitute for in-person clinic visit. Patient and provider were located at their individual homes. --DEYANIRA Joseph - CNP on 2/17/2021 at 5:52 PM    An electronic signature was used to authenticate this note.     CC: Bipolar II D/O     HPI Lashae Zavala is seen for follow up and  medication management. Reports meds continue to work \"ok\"  without side effects.  .Denies having a rash. Good med compliance is still  reported. Denies mood swings. Denies depression. Denies feelings of harm towards self or others. Reports continues dealing with  pain and feels this is still her primary issue. Reports being referred to Dr Marina Campos as pain is thought to be coming from his neck. Reports doing a little better with passing of her father 9-1-20. Reports  remains  very supportive. Reports being happy as has counseling appt with Dr Bobbi Murillo month. Reports she and  are still getting along well.  . Labs reviewed drawn 8- No critical abnormals noted.   Reports still  being on Valium Rxed by PCP for anxiety and sleep. Client advised again Valium would not be Rxed from this office.      Past Medical Hx:  Reports allergies to Tramadol. Codeine, Percocets, Vicodin  Reports having Hypothyroidism, Migraine Headaches, HTN. DDD, Asthma, Osteoarthritis  Reports partial hysterectomy 2001     Past Psychiatric Hx:  Denies any past psych hospitalizations. Denies any past suicidal gestures. Denies ever being under care of psychiatrist. Reports receives counseling at [facility]. Currently xanax Rxed by PCP and Valium Rxed by PCP. 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 MARITAL STATUS: Currently  2nd marriage. For 24 years. Reports relationship is good. OCCUPATION: Currently on Disability. Last worked at [employer] left there 2015  [address]: Reports having associates degree in [employer]. LIVING SITUATION: Lives with  in [facility]. Has 2 grown children. LEGAL:  Reports past 2 DUI's.  Last was 1994     MSE: Level of consciousness: Alert  Appearance:  fair grooming   Behavior/Motor: no abnormalities noted   Attitude toward examiner: cooperative   Speech: Normal volume, goal directed, NRR  Mood: Euthymic  Affect: Reactive  Thought processes: Linear and goal directed   Suicidal Ideation: Denies suicidal ideations  Homicidal ideation: Denies homicidal ideations  Delusions: No evidence of delusions is observed  Perceptual Disturbance: Denies AH/VH;  No evidence of psychosis is observed. Cognition: Oriented to person, place, time and situation   Concentration fair   Memory intact   Insight: Fair  Judgment: Fair     ROS:  Constitutional: Negative for fever, chills and fatigue. HENT: Negative for ear pain, congestion, rhinorrhea and neck pain. Eyes: Negative for pain and discharge. Respiratory: Negative for cough, chest tightness and wheezing. Repots being on Advair for asthma  Cardiovascular: Negative for chest pain, palpitations and leg swelling. Gastrointestinal: Negative for nausea, vomiting and diarrhea. Genitourinary: Negative for dysuria and frequency. Musculoskeletal: Reports still having chronic back and neck  pain. Being followed by Dr Salome Casanova. Skin: Negative for rash. Neurological: Negative for dizziness, weakness. Reports has migraine headaches     Impression:  Bipolar II D/O     Plan:  Continue current meds:  Lamictal 200mg po q am    Wellbutrin XL 300mg po q am  Med education given.  Faye Tavera voiced understanding of education given.   Advised to keep counseling appt with Dr Carrington Montenegro in March 2021  RTC 3 mos

## 2021-03-10 DIAGNOSIS — Z12.31 ENCOUNTER FOR SCREENING MAMMOGRAM FOR MALIGNANT NEOPLASM OF BREAST: Primary | ICD-10-CM

## 2021-03-10 NOTE — LETTER
DEYANIRA Pineda CNP  [address] [address]  Phone: [phone]  Fax: [phone]        March 10, 2021     Batsheva Inez  [facility] [facility] [address] [facility] [address] [address]      Dear Kameron Casey: Our records indicate that you are due for a mammogram.    In the [place], one in nine women will develop breast cancer during their lifetime. While there is no way to prevent breast cancer, early detection provides the best opportunity for curing it. For women over the age of 36, the [address] recommends a yearly clinical breast exam and a yearly mammogram. These practices have saved thousands of lives. We need your help to ensure that you are receiving optimal medical care. Please make an appointment for a mammogram at your earliest convenience.     Sincerely,        DEYANIRA Pineda CNP

## 2021-03-11 DIAGNOSIS — M96.1 FAILED NECK SYNDROME: ICD-10-CM

## 2021-03-11 DIAGNOSIS — F33.41 RECURRENT MAJOR DEPRESSIVE DISORDER, IN PARTIAL REMISSION (HCC): ICD-10-CM

## 2021-03-11 RX ORDER — DIAZEPAM 5 MG/1
5 TABLET ORAL EVERY 8 HOURS PRN
Qty: 90 TABLET | Refills: 0 | OUTPATIENT
Start: 2021-03-11 | End: 2022-03-11

## 2021-03-16 ENCOUNTER — OFFICE VISIT (OUTPATIENT)
Dept: FAMILY MEDICINE CLINIC | Age: 56
End: 2021-03-16
Payer: MEDICARE

## 2021-03-16 VITALS
HEART RATE: 69 BPM | RESPIRATION RATE: 16 BRPM | DIASTOLIC BLOOD PRESSURE: 74 MMHG | TEMPERATURE: 98.5 F | OXYGEN SATURATION: 100 % | BODY MASS INDEX: 25.58 KG/M2 | WEIGHT: 135.4 LBS | SYSTOLIC BLOOD PRESSURE: 136 MMHG

## 2021-03-16 DIAGNOSIS — J45.20 MILD INTERMITTENT ASTHMA WITHOUT COMPLICATION: Primary | Chronic | ICD-10-CM

## 2021-03-16 DIAGNOSIS — M25.50 MULTIPLE JOINT PAIN: ICD-10-CM

## 2021-03-16 DIAGNOSIS — G43.809 OTHER MIGRAINE WITHOUT STATUS MIGRAINOSUS, NOT INTRACTABLE: ICD-10-CM

## 2021-03-16 DIAGNOSIS — F33.41 RECURRENT MAJOR DEPRESSIVE DISORDER, IN PARTIAL REMISSION (HCC): ICD-10-CM

## 2021-03-16 DIAGNOSIS — M96.1 FAILED NECK SYNDROME: ICD-10-CM

## 2021-03-16 PROCEDURE — G8419 CALC BMI OUT NRM PARAM NOF/U: HCPCS | Performed by: NURSE PRACTITIONER

## 2021-03-16 PROCEDURE — G8484 FLU IMMUNIZE NO ADMIN: HCPCS | Performed by: NURSE PRACTITIONER

## 2021-03-16 PROCEDURE — 99215 OFFICE O/P EST HI 40 MIN: CPT | Performed by: NURSE PRACTITIONER

## 2021-03-16 PROCEDURE — 3017F COLORECTAL CA SCREEN DOC REV: CPT | Performed by: NURSE PRACTITIONER

## 2021-03-16 PROCEDURE — 1036F TOBACCO NON-USER: CPT | Performed by: NURSE PRACTITIONER

## 2021-03-16 PROCEDURE — G8427 DOCREV CUR MEDS BY ELIG CLIN: HCPCS | Performed by: NURSE PRACTITIONER

## 2021-03-16 RX ORDER — RIMEGEPANT SULFATE 75 MG/75MG
1 TABLET, ORALLY DISINTEGRATING ORAL EVERY OTHER DAY
Qty: 15 TABLET | Refills: 2 | Status: SHIPPED | OUTPATIENT
Start: 2021-03-16 | End: 2021-05-11

## 2021-03-16 RX ORDER — TIOTROPIUM BROMIDE 18 UG/1
18 CAPSULE ORAL; RESPIRATORY (INHALATION) DAILY
Qty: 90 CAPSULE | Refills: 1 | Status: SHIPPED | OUTPATIENT
Start: 2021-03-16 | End: 2021-08-10

## 2021-03-16 RX ORDER — DIAZEPAM 5 MG/1
5 TABLET ORAL EVERY 8 HOURS PRN
Qty: 90 TABLET | Refills: 0 | Status: SHIPPED | OUTPATIENT
Start: 2021-03-16 | End: 2021-05-11 | Stop reason: SDUPTHER

## 2021-03-16 ASSESSMENT — ENCOUNTER SYMPTOMS
SORE THROAT: 0
WHEEZING: 0
RHINORRHEA: 0
EYE PAIN: 0
DIARRHEA: 0
EYE DISCHARGE: 0
VOMITING: 0
NAUSEA: 0
SHORTNESS OF BREATH: 1
COUGH: 1
ALLERGIC/IMMUNOLOGIC NEGATIVE: 1
ABDOMINAL PAIN: 0
BACK PAIN: 0
EYE REDNESS: 0
TROUBLE SWALLOWING: 0
CONSTIPATION: 0

## 2021-03-16 NOTE — PROGRESS NOTES
[facility]  [address]  [facility] [address]  Dept: [phone]  Dept Fax: [phone]  Loc: [phone]     Visit Date:  3/16/2021      Patient:  Jovanny Thibodeaux  YOB: 1965    HPI:     Chief Complaint   Patient presents with    3 Month Follow-Up     DDD, HTN, Hypothyroidism, depression--discuss increasing valium    Headache     saw Whit Couch re: ARREGUIN, said she has severe arthritis, referred to rheum Anival Ly)    Breathing Problem     hx of asthma, breathing has been worse since having covid       Patient comes with multiple complaints today the chief has to do with ongoing breathing difficulty after having Covid in August 2020. She states because of her asthma it seems like her lungs feel tighter now she coughs more, uses her rescue inhaler a couple times a day. She was previously placed on Advair but this was too expensive so she quit taking it. She also takes Singulair at night which may help a little bit. She feels a tightness in her upper chest and sometimes short of breath. Patient is also having a significant amount of anxiety due to her life situation and difficult struggles and health of her . Patient is asking for an increase in her Valium dosing. She states that her recent visit to orthopedics for reevaluation of her cervical problems ended up in that there was nothing more that could be done from an orthopedic standpoint but there was suspicion for arthritis in her neck, as she also complains about multiple joints of arthritis. Patient has had a work-up for rheumatoid arthritis in the past and it has been negative. She complains of significant multiple joint pain 24/7. Patient is having numerous recurring migraine headaches throughout the month. She states Fioricet cost several $100 a month now when she can no longer afford it.   She is taking as many as 3 Tylenol up to 3 times a day for her headaches. Patient has been following with overall pain management and she states she was recently told there is nothing more they can do for her pain. Medications    Current Outpatient Medications:     diazePAM (VALIUM) 5 MG tablet, Take 1 tablet by mouth every 8 hours as needed for Anxiety or Sleep (spasms). , Disp: 90 tablet, Rfl: 0    tiotropium (Zane Darinel) 18 MCG inhalation capsule, Inhale 1 capsule into the lungs daily, Disp: 90 capsule, Rfl: 1    Rimegepant Sulfate (NURTEC) 75 MG TBDP, Take 1 tablet by mouth every other day, Disp: 15 tablet, Rfl: 2    lamoTRIgine (LAMICTAL) 200 MG tablet, Take 1 tablet by mouth daily, Disp: 90 tablet, Rfl: 0    buPROPion (WELLBUTRIN XL) 300 MG extended release tablet, Take 1 tablet by mouth every morning, Disp: 90 tablet, Rfl: 0    albuterol sulfate HFA (PROAIR HFA) 108 (90 Base) MCG/ACT inhaler, Inhale 2 puffs into the lungs every 6 hours as needed for Wheezing, Disp: 1 Inhaler, Rfl: 5    montelukast (SINGULAIR) 10 MG tablet, Take 1 tablet by mouth nightly, Disp: 30 tablet, Rfl: 5    cyclobenzaprine (FLEXERIL) 10 MG tablet, Take 1 tablet by mouth nightly as needed for Muscle spasms, Disp: 30 tablet, Rfl: 1    fluticasone-salmeterol (ADVAIR DISKUS) 100-50 MCG/DOSE diskus inhaler, Inhale 1 puff into the lungs every 12 hours, Disp: 60 each, Rfl: 5    lisinopril (PRINIVIL;ZESTRIL) 20 MG tablet, Take 1 tablet by mouth daily, Disp: 30 tablet, Rfl: 5    levothyroxine (SYNTHROID) 88 MCG tablet, Take 1 tablet by mouth daily, Disp: 90 tablet, Rfl: 2    hydrocortisone 2.5 % cream, Apply topically 2 times daily. , Disp: 1 Tube, Rfl: 1    lidocaine (XYLOCAINE) 2 % jelly, Apply 2-3 times daily, Disp: 1 Tube, Rfl: 3    The patient is allergic to abilify [aripiprazole]; cephalexin; codeine; excedrin migraine [aspirin-acetaminophen-caffeine]; neurontin [gabapentin]; nucynta [tapentadol hcl er]; percocet [oxycodone-acetaminophen]; tramadol; tylenol with codeine #3 [acetaminophen-codeine]; and vicodin [hydrocodone-acetaminophen]. Past Medical History  Hemanth Hermosillo  has a past medical history of Anxiety, Asthma, Autoimmune disease ([address] Bipolar 1 disorder ([address] Blood in urine, Bronchitis, Cataracts, bilateral, Degenerative cervical disc, Degenerative disc disease, Depression, Frequent headaches, Hypertension, Hypothyroidism, Interstitial cystitis, Iritis, Migraines, Osteopenia, PONV (postoperative nausea and vomiting), Shortness of breath, and Thyroid disease. Subjective:      Review of Systems   Constitutional: Negative for activity change, fatigue and fever. HENT: Negative for congestion, ear pain, rhinorrhea, sore throat and trouble swallowing. Eyes: Negative for pain, discharge and redness. Respiratory: Positive for cough and shortness of breath. Negative for wheezing. Cardiovascular: Negative. Gastrointestinal: Negative for abdominal pain, constipation, diarrhea, nausea and vomiting. Endocrine: Negative. Genitourinary: Negative for dysuria, frequency and urgency. Musculoskeletal: Positive for arthralgias. Negative for back pain and myalgias. Skin: Negative for rash. Allergic/Immunologic: Negative. Neurological: Negative for dizziness, tremors, weakness and headaches. Hematological: Negative. Psychiatric/Behavioral: Positive for dysphoric mood and sleep disturbance. The patient is nervous/anxious. Objective:     /74 (Site: Right Upper Arm)   Pulse 69   Temp 98.5 °F (36.9 °C) (Oral)   Resp 16   Wt 135 lb 6.4 oz (61.4 kg)   SpO2 100%   BMI 25.58 kg/m²     Physical Exam  Vitals signs reviewed. Constitutional:       General: She is not in acute distress. Appearance: Normal appearance. She is well-developed. She is not toxic-appearing or diaphoretic. HENT:      Head: Normocephalic. No right periorbital erythema or left periorbital erythema. Jaw: No trismus.       Right Ear: Hearing, tympanic membrane, ear canal and external ear normal.      Left Ear: Hearing, tympanic membrane, ear canal and external ear normal.      Nose: Nose normal. No mucosal edema or rhinorrhea. Mouth/Throat:      Mouth: Mucous membranes are moist.      Dentition: Normal dentition. Pharynx: Uvula midline. No posterior oropharyngeal erythema. Tonsils: No tonsillar exudate. 0 on the right. 0 on the left. Eyes:      General: Lids are normal.         Right eye: No discharge. Left eye: No discharge. Conjunctiva/sclera: Conjunctivae normal.      Right eye: Right conjunctiva is not injected. No chemosis. Left eye: No chemosis. Pupils: Pupils are equal, round, and reactive to light. Neck:      Musculoskeletal: Full passive range of motion without pain and normal range of motion. Vascular: No JVD. Trachea: Trachea normal.   Cardiovascular:      Rate and Rhythm: Normal rate and regular rhythm. Heart sounds: Normal heart sounds. No murmur. Pulmonary:      Effort: Pulmonary effort is normal. No respiratory distress. Breath sounds: Normal breath sounds. No stridor. No wheezing. Musculoskeletal: Normal range of motion. General: Tenderness present. No swelling or signs of injury. Right lower leg: No edema. Left lower leg: No edema. Lymphadenopathy:      Cervical: No cervical adenopathy. Skin:     General: Skin is warm and dry. Capillary Refill: Capillary refill takes less than 2 seconds. Findings: No rash. Neurological:      Mental Status: She is alert and oriented to person, place, and time. GCS: GCS eye subscore is 4. GCS verbal subscore is 5. GCS motor subscore is 6. Cranial Nerves: No cranial nerve deficit. Coordination: Coordination normal.      Gait: Gait normal.   Psychiatric:         Mood and Affect: Mood is not anxious or depressed. Speech: Speech normal.         Behavior: Behavior normal. Behavior is not withdrawn or hyperactive.  Behavior is cooperative. Thought Content: Thought content normal.         Judgment: Judgment normal.         Assessment/Plan:      Alistair Maki was seen today for 3 month follow-up, headache and breathing problem. I spent in excess of 45 minutes with the patient today and evaluation, review of medical records, and planning her care. Diagnoses and all orders for this visit:    Mild intermittent asthma without complication  -     tiotropium (SPIRIVA HANDIHALER) 18 MCG inhalation capsule; Inhale 1 capsule into the lungs daily    Patient is given a sample of Spiriva today and the first dose is given to her and demonstrated. She will see how insurance coverage goes. Failed neck syndrome  -     diazePAM (VALIUM) 5 MG tablet; Take 1 tablet by mouth every 8 hours as needed for Anxiety or Sleep (spasms). Recurrent major depressive disorder, in partial remission (HCC)  -     diazePAM (VALIUM) 5 MG tablet; Take 1 tablet by mouth every 8 hours as needed for Anxiety or Sleep (spasms). Patient's Valium was refilled at current dosing as I was reluctant to increase it in light of her other psychiatric medications and that she is following with a psychologist.    Multiple joint pain  -     C-Reactive Protein; Future  -     Sedimentation Rate; Future  -     Rheumatoid Factor; Future     We will recheck patient's labs on these inflammatory markers. Patient already has a referral to rheumatology. Other migraine without status migrainosus, not intractable  -     Rimegepant Sulfate (NURTEC) 75 MG TBDP; Take 1 tablet by mouth every other day     Patient states she is not able to afford Fioricet which she has gotten in the past so she is given a sample of Nurtec and will see if insurance will cover this also. Return in about 3 months (around 6/16/2021). Patient instructions given gladys.         Electronically signed by DEYANIRA Mckeon CNP on 3/16/2021 at 4:27 PM

## 2021-03-22 NOTE — TELEPHONE ENCOUNTER
[employer] [place] has denied Nurtec. Must try 2 generic triptans first, she has not. See denial scanned in.

## 2021-03-23 DIAGNOSIS — J45.20 MILD INTERMITTENT ASTHMA WITHOUT COMPLICATION: Primary | ICD-10-CM

## 2021-03-23 RX ORDER — UMECLIDINIUM BROMIDE AND VILANTEROL TRIFENATATE 62.5; 25 UG/1; UG/1
1 POWDER RESPIRATORY (INHALATION) DAILY
Qty: 1 EACH | Refills: 3 | Status: SHIPPED | OUTPATIENT
Start: 2021-03-23 | End: 2021-08-10

## 2021-03-26 ENCOUNTER — NURSE ONLY (OUTPATIENT)
Dept: LAB | Age: 56
End: 2021-03-26

## 2021-03-26 DIAGNOSIS — L65.9 HAIR LOSS: ICD-10-CM

## 2021-03-26 DIAGNOSIS — E03.9 HYPOTHYROIDISM, UNSPECIFIED TYPE: ICD-10-CM

## 2021-03-26 LAB
ANION GAP SERPL CALCULATED.3IONS-SCNC: 13 MEQ/L (ref 8–16)
BASOPHILS # BLD: 0.9 %
BASOPHILS ABSOLUTE: 0.1 THOU/MM3 (ref 0–0.1)
BUN BLDV-MCNC: 15 MG/DL (ref 7–22)
CALCIUM SERPL-MCNC: 9.8 MG/DL (ref 8.5–10.5)
CHLORIDE BLD-SCNC: 103 MEQ/L (ref 98–111)
CO2: 25 MEQ/L (ref 23–33)
CREAT SERPL-MCNC: 0.6 MG/DL (ref 0.4–1.2)
EOSINOPHIL # BLD: 4.9 %
EOSINOPHILS ABSOLUTE: 0.3 THOU/MM3 (ref 0–0.4)
ERYTHROCYTE [DISTWIDTH] IN BLOOD BY AUTOMATED COUNT: 13.1 % (ref 11.5–14.5)
ERYTHROCYTE [DISTWIDTH] IN BLOOD BY AUTOMATED COUNT: 45.1 FL (ref 35–45)
FOLATE: 10.1 NG/ML (ref 4.8–24.2)
GFR SERPL CREATININE-BSD FRML MDRD: > 90 ML/MIN/1.73M2
GLUCOSE BLD-MCNC: 95 MG/DL (ref 70–108)
HCT VFR BLD CALC: 42.7 % (ref 37–47)
HEMOGLOBIN: 13.1 GM/DL (ref 12–16)
IMMATURE GRANS (ABS): 0.01 THOU/MM3 (ref 0–0.07)
IMMATURE GRANULOCYTES: 0.2 %
IRON: 66 UG/DL (ref 50–170)
LYMPHOCYTES # BLD: 37.1 %
LYMPHOCYTES ABSOLUTE: 2.1 THOU/MM3 (ref 1–4.8)
MCH RBC QN AUTO: 28.8 PG (ref 26–33)
MCHC RBC AUTO-ENTMCNC: 30.7 GM/DL (ref 32.2–35.5)
MCV RBC AUTO: 93.8 FL (ref 81–99)
MONOCYTES # BLD: 9.9 %
MONOCYTES ABSOLUTE: 0.6 THOU/MM3 (ref 0.4–1.3)
NUCLEATED RED BLOOD CELLS: 0 /100 WBC
PLATELET # BLD: 310 THOU/MM3 (ref 130–400)
PMV BLD AUTO: 10.4 FL (ref 9.4–12.4)
POTASSIUM SERPL-SCNC: 4.3 MEQ/L (ref 3.5–5.2)
RBC # BLD: 4.55 MILL/MM3 (ref 4.2–5.4)
SEG NEUTROPHILS: 47 %
SEGMENTED NEUTROPHILS ABSOLUTE COUNT: 2.7 THOU/MM3 (ref 1.8–7.7)
SODIUM BLD-SCNC: 141 MEQ/L (ref 135–145)
T4 FREE: 1.71 NG/DL (ref 0.93–1.76)
TOTAL IRON BINDING CAPACITY: 398 UG/DL (ref 171–450)
TSH SERPL DL<=0.05 MIU/L-ACNC: 0.79 UIU/ML (ref 0.4–4.2)
VITAMIN B-12: 483 PG/ML (ref 211–911)
WBC # BLD: 5.7 THOU/MM3 (ref 4.8–10.8)

## 2021-04-07 ENCOUNTER — NURSE ONLY (OUTPATIENT)
Dept: UROLOGY | Age: 56
End: 2021-04-07

## 2021-04-07 DIAGNOSIS — N39.41 URGE INCONTINENCE: Primary | ICD-10-CM

## 2021-04-07 NOTE — PROGRESS NOTES
Reviewed, released, authenticated and confirmed by Dr. Gibson Madison. I ordered sacral films PA and lateral to make sure the lead is in good position.

## 2021-04-21 ENCOUNTER — HOSPITAL ENCOUNTER (OUTPATIENT)
Dept: GENERAL RADIOLOGY | Age: 56
Discharge: HOME | End: 2021-04-21
Payer: MEDICARE

## 2021-04-21 ENCOUNTER — HOSPITAL ENCOUNTER (OUTPATIENT)
Age: 56
Discharge: HOME | End: 2021-04-21
Payer: MEDICARE

## 2021-04-21 DIAGNOSIS — N39.41 URGE INCONTINENCE: ICD-10-CM

## 2021-04-21 PROCEDURE — 72220 X-RAY EXAM SACRUM TAILBONE: CPT

## 2021-05-05 ENCOUNTER — TELEPHONE (OUTPATIENT)
Dept: UROLOGY | Age: 56
End: 2021-05-05

## 2021-05-05 NOTE — TELEPHONE ENCOUNTER
Maria L Liu had her turn off the interstim 3 weeks ago or so until she had the results of the xray. The interstim was not helping. What should she do now? Does she need to call Maria L Liu again or do you want to a see her in the office with Maria L Liu? Please advise.  Thank you

## 2021-05-05 NOTE — TELEPHONE ENCOUNTER
Attempted to call the patient. Voicemail left to return the call to the office and to call Rip Tijerina.

## 2021-05-05 NOTE — TELEPHONE ENCOUNTER
Sacral films reviewed by DR Johann Robledo. Note in stating lead is in good position on the AP and lateral views. Attempted to call the patient. Voicemail left to return the call to the office.

## 2021-05-05 NOTE — TELEPHONE ENCOUNTER
Janelle Huynh is calling to request a call back with he xray results of her Sacrum Coccyx, done 04-21 ordered by Katia. Please call her with results.

## 2021-05-06 ENCOUNTER — TELEPHONE (OUTPATIENT)
Dept: FAMILY MEDICINE CLINIC | Age: 56
End: 2021-05-06

## 2021-05-06 NOTE — TELEPHONE ENCOUNTER
Patient voiced understanding to call [facility] and will call the office back for any questions or concerns.

## 2021-05-06 NOTE — TELEPHONE ENCOUNTER
----- Message from Bozena Lopez sent at 5/6/2021  1:51 PM EDT -----  Subject: Message to Provider    QUESTIONS  Information for Provider? Patient was speaking with Deyanira Jones about some   paperwork she needs completed. Michelle Allengenevieve states she accidently hung up the   phone and didn't mean to. She just wants the office to know that she will   be dropping off the paperwork either today or tomorrow. ---------------------------------------------------------------------------  --------------  Darren ESCAMILLA  What is the best way for the office to contact you? OK to leave message on   voicemail  Preferred Call Back Phone Number? [phone]  ---------------------------------------------------------------------------  --------------  SCRIPT ANSWERS  Relationship to Patient?  Self

## 2021-05-10 ENCOUNTER — TELEPHONE (OUTPATIENT)
Dept: FAMILY MEDICINE CLINIC | Age: 56
End: 2021-05-10

## 2021-05-10 NOTE — TELEPHONE ENCOUNTER
----- Message from Che Holly sent at 5/10/2021  4:00 PM EDT -----  Subject: Message to Provider    QUESTIONS  Information for Provider? Patient wanted to know if she could have   antibodics and steroid for sinus infection. Started Firday 5/7 she has   runny nose, cough, sore throat, sinus pressure, and pain in ears feels   like it is in her chest now. [facility] Please call advise patient what is sent over. ---------------------------------------------------------------------------  --------------  Dar Ogden INFO  What is the best way for the office to contact you? OK to leave message on   voicemail  Preferred Call Back Phone Number? [phone]  ---------------------------------------------------------------------------  --------------  SCRIPT ANSWERS  Relationship to Patient?  Self

## 2021-05-11 ENCOUNTER — OFFICE VISIT (OUTPATIENT)
Dept: FAMILY MEDICINE CLINIC | Age: 56
End: 2021-05-11
Payer: MEDICARE

## 2021-05-11 ENCOUNTER — TELEPHONE (OUTPATIENT)
Dept: UROLOGY | Age: 56
End: 2021-05-11

## 2021-05-11 VITALS
DIASTOLIC BLOOD PRESSURE: 64 MMHG | TEMPERATURE: 98.6 F | SYSTOLIC BLOOD PRESSURE: 128 MMHG | BODY MASS INDEX: 25.19 KG/M2 | HEART RATE: 102 BPM | OXYGEN SATURATION: 98 % | RESPIRATION RATE: 18 BRPM | WEIGHT: 133.3 LBS

## 2021-05-11 DIAGNOSIS — F34.1 DYSTHYMIA: ICD-10-CM

## 2021-05-11 DIAGNOSIS — G44.86 CERVICOGENIC HEADACHE: ICD-10-CM

## 2021-05-11 DIAGNOSIS — J01.90 ACUTE BACTERIAL SINUSITIS: ICD-10-CM

## 2021-05-11 DIAGNOSIS — F33.41 RECURRENT MAJOR DEPRESSIVE DISORDER, IN PARTIAL REMISSION (HCC): ICD-10-CM

## 2021-05-11 DIAGNOSIS — F43.21 GRIEF: ICD-10-CM

## 2021-05-11 DIAGNOSIS — M96.1 FAILED NECK SYNDROME: ICD-10-CM

## 2021-05-11 DIAGNOSIS — F33.2 SEVERE EPISODE OF RECURRENT MAJOR DEPRESSIVE DISORDER, WITHOUT PSYCHOTIC FEATURES (HCC): ICD-10-CM

## 2021-05-11 DIAGNOSIS — B96.89 ACUTE BACTERIAL SINUSITIS: ICD-10-CM

## 2021-05-11 DIAGNOSIS — E03.9 HYPOTHYROIDISM, UNSPECIFIED TYPE: Primary | ICD-10-CM

## 2021-05-11 DIAGNOSIS — J45.20 MILD INTERMITTENT REACTIVE AIRWAY DISEASE WITHOUT COMPLICATION: ICD-10-CM

## 2021-05-11 DIAGNOSIS — H65.03 BILATERAL ACUTE SEROUS OTITIS MEDIA, RECURRENCE NOT SPECIFIED: ICD-10-CM

## 2021-05-11 PROCEDURE — G8419 CALC BMI OUT NRM PARAM NOF/U: HCPCS | Performed by: NURSE PRACTITIONER

## 2021-05-11 PROCEDURE — 1036F TOBACCO NON-USER: CPT | Performed by: NURSE PRACTITIONER

## 2021-05-11 PROCEDURE — G8427 DOCREV CUR MEDS BY ELIG CLIN: HCPCS | Performed by: NURSE PRACTITIONER

## 2021-05-11 PROCEDURE — 99214 OFFICE O/P EST MOD 30 MIN: CPT | Performed by: NURSE PRACTITIONER

## 2021-05-11 PROCEDURE — 3017F COLORECTAL CA SCREEN DOC REV: CPT | Performed by: NURSE PRACTITIONER

## 2021-05-11 RX ORDER — LEVOTHYROXINE SODIUM 88 UG/1
88 TABLET ORAL DAILY
Qty: 90 TABLET | Refills: 2 | Status: SHIPPED | OUTPATIENT
Start: 2021-05-11 | End: 2022-02-01 | Stop reason: DRUGHIGH

## 2021-05-11 RX ORDER — CETIRIZINE HYDROCHLORIDE 10 MG/1
10 TABLET ORAL DAILY
Qty: 30 TABLET | Refills: 5 | Status: SHIPPED | OUTPATIENT
Start: 2021-05-11

## 2021-05-11 RX ORDER — BENZONATATE 200 MG/1
200 CAPSULE ORAL 3 TIMES DAILY PRN
Qty: 30 CAPSULE | Refills: 0 | Status: SHIPPED | OUTPATIENT
Start: 2021-05-11 | End: 2021-05-18

## 2021-05-11 RX ORDER — CIPROFLOXACIN 500 MG/1
500 TABLET, FILM COATED ORAL 2 TIMES DAILY
Qty: 20 TABLET | Refills: 0 | Status: SHIPPED | OUTPATIENT
Start: 2021-05-11 | End: 2021-05-21

## 2021-05-11 RX ORDER — DIAZEPAM 5 MG/1
5 TABLET ORAL EVERY 8 HOURS PRN
Qty: 90 TABLET | Refills: 0 | Status: SHIPPED | OUTPATIENT
Start: 2021-05-11 | End: 2021-07-30 | Stop reason: SDUPTHER

## 2021-05-11 RX ORDER — PREDNISONE 10 MG/1
TABLET ORAL
Qty: 30 TABLET | Refills: 0 | Status: SHIPPED | OUTPATIENT
Start: 2021-05-11 | End: 2021-05-21

## 2021-05-12 ENCOUNTER — NURSE ONLY (OUTPATIENT)
Dept: UROLOGY | Age: 56
End: 2021-05-12

## 2021-05-12 DIAGNOSIS — N39.46 MIXED STRESS AND URGE URINARY INCONTINENCE: Primary | ICD-10-CM

## 2021-05-12 PROCEDURE — 99999 PR OFFICE/OUTPT VISIT,PROCEDURE ONLY: CPT | Performed by: UROLOGY

## 2021-05-12 NOTE — PROGRESS NOTES
[employer] Rep saw pt today. She did not adjust any settings. Per Dr Olvera Dial patient to see Dr Alan Basurto and Geoffrey CAMARGO.

## 2021-05-13 ASSESSMENT — ENCOUNTER SYMPTOMS
SHORTNESS OF BREATH: 0
SINUS PAIN: 1
RHINORRHEA: 1
BACK PAIN: 1
COUGH: 1
SINUS PRESSURE: 1
SORE THROAT: 1

## 2021-05-13 NOTE — PROGRESS NOTES
Hypothyroidism     Interstitial cystitis     Iritis     OU      Migraines     Osteopenia     PONV (postoperative nausea and vomiting)     Shortness of breath     Thyroid disease       Past Surgical History:   Procedure Laterality Date    BACK SURGERY      multiple injections and nerve blocks at cervical and lumbar    CERVICAL FUSION      c 4,5,6    CERVICAL FUSION  2015    CYSTOSCOPY N/A 2019    CYSTOSCOPY WITH HYDRODISTENTION WITH INSTILLATION OF DMSO AND MARCAINE performed by Akiko Gibbons MD at [address] [place] [address] (W OR W/O BIOPSY)      HC PAIN BLOCK Bilateral 2019    Third occipital nerve block bilateral. Left trigger point trapezius performed by Govind Padilla MD at [facility]      [address] [place] [address] 95 N/A 2019    STAGE 1 / INSERTION OF INTERSTIM DEVICE performed by Akiko Gibbons MD at [facility] N/A 2019    STAGE II STIMULATOR INSERTION performed by Akiko Gibbons MD at [facility] [place] History   Problem Relation Age of Onset    Rheum Arthritis Mother     Thyroid Disease Mother     Colon Polyps Mother         esophageal polyps    Other Mother     High Blood Pressure Father     Diabetes Father     High Blood Pressure Sister     Thyroid Disease Brother     High Blood Pressure Brother     Liver Cancer Maternal Uncle     Cancer Maternal Grandfather         pancreatic cancer    Colon Cancer Neg Hx      Social History     Tobacco Use    Smoking status: Former Smoker     Types: Cigarettes     Quit date:      Years since quittin.3    Smokeless tobacco: Never Used    Tobacco comment: 1 pack per week intermit for 20 yrs   Substance Use Topics    Alcohol use: No     Alcohol/week: 0.0 standard drinks      Current Outpatient Medications   Medication Sig Dispense Refill    levothyroxine (SYNTHROID) 88 MCG tablet Take 1 tablet by mouth daily 90 tablet 2    diazePAM (VALIUM) 5 MG tablet Take 1 tablet by mouth every 8 hours as needed for Anxiety or Sleep (spasms). 90 tablet 0    ciprofloxacin (CIPRO) 500 MG tablet Take 1 tablet by mouth 2 times daily for 10 days 20 tablet 0    predniSONE (DELTASONE) 10 MG tablet 4 po qd for 3 days, then 3 po qd for 3 days, then 2 po qd for 3 days, then 1 po qd for 3 days 30 tablet 0    benzonatate (TESSALON) 200 MG capsule Take 1 capsule by mouth 3 times daily as needed for Cough 30 capsule 0    cetirizine (ZYRTEC) 10 MG tablet Take 1 tablet by mouth daily 30 tablet 5    umeclidinium-vilanterol (ANORO ELLIPTA) 62.5-25 MCG/INH AEPB inhaler Inhale 1 puff into the lungs daily 1 each 3    tiotropium (SPIRIVA HANDIHALER) 18 MCG inhalation capsule Inhale 1 capsule into the lungs daily 90 capsule 1    lamoTRIgine (LAMICTAL) 200 MG tablet Take 1 tablet by mouth daily 90 tablet 0    buPROPion (WELLBUTRIN XL) 300 MG extended release tablet Take 1 tablet by mouth every morning 90 tablet 0    albuterol sulfate HFA (PROAIR HFA) 108 (90 Base) MCG/ACT inhaler Inhale 2 puffs into the lungs every 6 hours as needed for Wheezing 1 Inhaler 5    cyclobenzaprine (FLEXERIL) 10 MG tablet Take 1 tablet by mouth nightly as needed for Muscle spasms 30 tablet 1    fluticasone-salmeterol (ADVAIR DISKUS) 100-50 MCG/DOSE diskus inhaler Inhale 1 puff into the lungs every 12 hours 60 each 5    lisinopril (PRINIVIL;ZESTRIL) 20 MG tablet Take 1 tablet by mouth daily 30 tablet 5    hydrocortisone 2.5 % cream Apply topically 2 times daily. 1 Tube 1    lidocaine (XYLOCAINE) 2 % jelly Apply 2-3 times daily 1 Tube 3     No current facility-administered medications for this visit.       Allergies   Allergen Reactions    Nurtec [Rimegepant Sulfate] Other (See Comments)     Makes her jittery    Abilify [Aripiprazole] Other (See Comments)     Vivid dreams    Cephalexin Nausea And Vomiting    Codeine Nausea And Vomiting    Excedrin Migraine [Aspirin-Acetaminophen-Caffeine] Nausea And Vomiting    Neurontin [Gabapentin] Anxiety    Nucynta [Tapentadol Hcl Er] Other (See Comments)     Terrible headache    Percocet [Oxycodone-Acetaminophen] Nausea And Vomiting    Tramadol Nausea And Vomiting    Tylenol With Codeine #3 [Acetaminophen-Codeine]      Nausea     Vicodin [Hydrocodone-Acetaminophen] Nausea And Vomiting     Health Maintenance   Topic Date Due    Hepatitis C screen  Never done    Pneumococcal 0-64 years Vaccine (1 of 1 - PPSV23) Never done    COVID-19 Vaccine (1) Never done    DTaP/Tdap/Td vaccine (1 - Tdap) Never done    Cervical cancer screen  Never done    Shingles Vaccine (1 of 2) Never done    A1C test (Diabetic or Prediabetic)  12/12/2017    Breast cancer screen  10/05/2019    Annual Wellness Visit (AWV)  Never done    Flu vaccine (Season Ended) 09/01/2021    TSH testing  03/26/2022    Potassium monitoring  03/26/2022    Creatinine monitoring  03/26/2022    Lipid screen  08/10/2025    Colon cancer screen colonoscopy  08/06/2030    HIV screen  Completed    Hepatitis A vaccine  Aged Out    Hepatitis B vaccine  Aged Out    Hib vaccine  Aged Out    Meningococcal (ACWY) vaccine  Aged Out         Objective:     Physical Exam  Vitals signs and nursing note reviewed. Constitutional:       Appearance: She is well-developed. HENT:      Head: Normocephalic. Right Ear: A middle ear effusion is present. Left Ear: A middle ear effusion is present. Nose: Mucosal edema and rhinorrhea present. Right Sinus: Maxillary sinus tenderness present. Left Sinus: Maxillary sinus tenderness present. Mouth/Throat:      Pharynx: Posterior oropharyngeal erythema present. Neck:      Musculoskeletal: Neck supple. Cardiovascular:      Rate and Rhythm: Normal rate. Heart sounds: Normal heart sounds.    Pulmonary:      Effort: Pulmonary effort is normal.      Breath sounds: Wheezing and rhonchi present. Abdominal:      General: Bowel sounds are normal.      Palpations: Abdomen is soft. Musculoskeletal: Normal range of motion. Skin:     General: Skin is warm and dry. Neurological:      Mental Status: She is alert and oriented to person, place, and time. Psychiatric:         Attention and Perception: Attention normal.         Mood and Affect: Mood is depressed. Affect is tearful. Speech: Speech is delayed. Behavior: Behavior is cooperative. Thought Content: Thought content normal.         Cognition and Memory: Cognition normal.         Judgment: Judgment normal.       /64 (Site: Left Upper Arm)   Pulse 102   Temp 98.6 °F (37 °C) (Oral)   Resp 18   Wt 133 lb 4.8 oz (60.5 kg)   SpO2 98%   BMI 25.19 kg/m²       Impression/Plan:  1. Hypothyroidism, unspecified type    2. Failed neck syndrome    3. Recurrent major depressive disorder, in partial remission ([facility] [address] [address]    4. Mild intermittent reactive airway disease without complication    5. Cervicogenic headache    6. Grief    7. Severe episode of recurrent major depressive disorder, without psychotic features ([facility] [address]    8. Dysthymia    Sinusitis  Requested Prescriptions     Signed Prescriptions Disp Refills    levothyroxine (SYNTHROID) 88 MCG tablet 90 tablet 2     Sig: Take 1 tablet by mouth daily    diazePAM (VALIUM) 5 MG tablet 90 tablet 0     Sig: Take 1 tablet by mouth every 8 hours as needed for Anxiety or Sleep (spasms).     ciprofloxacin (CIPRO) 500 MG tablet 20 tablet 0     Sig: Take 1 tablet by mouth 2 times daily for 10 days    predniSONE (DELTASONE) 10 MG tablet 30 tablet 0     Si po qd for 3 days, then 3 po qd for 3 days, then 2 po qd for 3 days, then 1 po qd for 3 days    benzonatate (TESSALON) 200 MG capsule 30 capsule 0     Sig: Take 1 capsule by mouth 3 times daily as needed for Cough    cetirizine (ZYRTEC) 10 MG tablet 30 tablet 5     Sig: Take 1 tablet by mouth daily     No orders of the defined types were placed in this encounter. Patient giveneducational materials - see patient instructions. Discussed use, benefit, and side effects of prescribed medications. All patient questions answered. Pt voiced understanding. Reviewed health maintenance. Patient agreedwith treatment plan. Follow up as directed. Valium and Synthroid refilled. OARRS report reviewed. Prednisone, Cipro and Zyrtec prescribed. Patient will consult with psychiatry/psychology this week regarding her worsening depression. Continue medications as prescribed.  Educational information on above diagnosis  provided per AVS.         Electronically signed by DEYANIRA Fuchs CNP on 5/13/2021 at 8:52 AM

## 2021-05-20 RX ORDER — LAMOTRIGINE 200 MG/1
200 TABLET ORAL DAILY
Qty: 90 TABLET | Refills: 0 | Status: SHIPPED | OUTPATIENT
Start: 2021-05-20 | End: 2021-05-26 | Stop reason: SDUPTHER

## 2021-05-20 RX ORDER — BUPROPION HYDROCHLORIDE 300 MG/1
300 TABLET ORAL EVERY MORNING
Qty: 90 TABLET | Refills: 0 | Status: SHIPPED | OUTPATIENT
Start: 2021-05-20 | End: 2021-05-26 | Stop reason: SDUPTHER

## 2021-05-25 NOTE — TELEPHONE ENCOUNTER
Last refill 90/0 on 8/14/18 You can access the FollowMyHealth Patient Portal offered by [facility] by registering at the following website: [email or web address] By joining [employer]’s FollowMyHealth portal, you will also be able to view your health information using other applications (apps) compatible with our system.

## 2021-05-26 ENCOUNTER — OFFICE VISIT (OUTPATIENT)
Dept: PSYCHIATRY | Age: 56
End: 2021-05-26
Payer: MEDICARE

## 2021-05-26 ENCOUNTER — NURSE TRIAGE (OUTPATIENT)
Dept: OTHER | Facility: CLINIC | Age: 56
End: 2021-05-26

## 2021-05-26 DIAGNOSIS — F41.1 GAD (GENERALIZED ANXIETY DISORDER): ICD-10-CM

## 2021-05-26 DIAGNOSIS — F31.81 BIPOLAR II DISORDER (HCC): Primary | ICD-10-CM

## 2021-05-26 PROCEDURE — 3017F COLORECTAL CA SCREEN DOC REV: CPT | Performed by: NURSE PRACTITIONER

## 2021-05-26 PROCEDURE — 1036F TOBACCO NON-USER: CPT | Performed by: NURSE PRACTITIONER

## 2021-05-26 PROCEDURE — G8428 CUR MEDS NOT DOCUMENT: HCPCS | Performed by: NURSE PRACTITIONER

## 2021-05-26 PROCEDURE — G8419 CALC BMI OUT NRM PARAM NOF/U: HCPCS | Performed by: NURSE PRACTITIONER

## 2021-05-26 PROCEDURE — 99213 OFFICE O/P EST LOW 20 MIN: CPT | Performed by: NURSE PRACTITIONER

## 2021-05-26 RX ORDER — LAMOTRIGINE 200 MG/1
200 TABLET ORAL DAILY
Qty: 90 TABLET | Refills: 0 | Status: SHIPPED | OUTPATIENT
Start: 2021-05-26 | End: 2021-07-14 | Stop reason: SDUPTHER

## 2021-05-26 RX ORDER — BUPROPION HYDROCHLORIDE 150 MG/1
450 TABLET ORAL EVERY MORNING
Qty: 270 TABLET | Refills: 0 | Status: SHIPPED | OUTPATIENT
Start: 2021-05-26 | End: 2021-07-14 | Stop reason: SDUPTHER

## 2021-05-26 NOTE — PROGRESS NOTES
Progress Note      CC: Bipolar II D/O; Add ZOE     HPI  Media Pile is seen for follow up and  medication management. States  meds continue to work \"ok\"  without side effects.  .Denies having a rash. Good med compliance is still  reported. Reports having increase depression. Request increased dose of Wellbutrin. Denies mood swings. Denies feelings of harm towards self or others. Reports still  dealing with  pain and feels this is still her primary issue. Reports saw  Dr Junette Prader and Rheumatoid Arthritis. is being ruled out. .Reports doing better with passing of her father 9-1-20. Reports  remains  very supportive. Reports missed  counseling appt with Dr Sydnee Stephen and is cancellation list.  Reports she and  are still getting along well.  . Labs reviewed draw March 2021  No critical abnormals noted.   Reports still  being on Valium Rxed by PCP for anxiety and sleep. Client advised again Valium would not be Rxed from this office. Janelle Huynh admits to having anxiety for years. Reports her meds help with this. Excited as reports going on vacation in July to [place].      Past Medical Hx:  Reports allergies to Tramadol. Codeine, Percocets, Vicodin  Reports having Hypothyroidism, Migraine Headaches, HTN. DDD, Asthma, Osteoarthritis  Reports partial hysterectomy 2001     Past Psychiatric Hx:  Denies any past psych hospitalizations. Denies any past suicidal gestures. Denies ever being under care of psychiatrist. Reports receives counseling at [facility]. Currently xanax Rxed by PCP and Valium Rxed by PCP. 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 MARITAL STATUS: Currently  2nd marriage. For 24 years. Reports relationship is good. OCCUPATION: Currently on Disability.  Last worked at [employer]

## 2021-05-26 NOTE — TELEPHONE ENCOUNTER
Reason for Disposition   Side (flank) or lower back pain present    Answer Assessment - Initial Assessment Questions  1. SYMPTOM: \"What's the main symptom you're concerned about? \" (e.g., frequency, incontinence)      Itching    2. ONSET: \"When did the  itching  start? \"      Day before yesterday    3. PAIN: \"Is there any pain? \" If so, ask: \"How bad is it? \" (Scale: 1-10; mild, moderate, severe)      Low back pain  5-6/10,  Burning with urination  4/10    4. CAUSE: \"What do you think is causing the symptoms? \"      I don't know    5. OTHER SYMPTOMS: \"Do you have any other symptoms? \" (e.g., fever, flank pain, blood in urine, pain with urination)      Burning with urination, frequency, low back pain, internal vaginal itching    6. PREGNANCY: \"Is there any chance you are pregnant? \" \"When was your last menstrual period? \"      No- hysterectomy    Protocols used: URINARY SYMPTOMS-ADULT-[place]    Received call from Geri 6508 at pre-service center [facility]) [address] [facility] with Red Flag Complaint. Brief description of triage: burning with urination, low back pain, internal vaginal itching, frequency    Triage indicates for patient to be seen today (within 24 hours)    Care advice provided, patient verbalizes understanding; denies any other questions or concerns; instructed to call back for any new or worsening symptoms. Writer provided warm transfer to Talia Han at [facility] for appointment scheduling. Attention Provider: Thank you for allowing me to participate in the care of your patient. The patient was connected to triage in response to information provided to the [facility]. Please do not respond through this encounter as the response is not directed to a shared pool.

## 2021-06-07 ENCOUNTER — NURSE ONLY (OUTPATIENT)
Dept: UROLOGY | Age: 56
End: 2021-06-07

## 2021-06-07 DIAGNOSIS — N32.81 OAB (OVERACTIVE BLADDER): ICD-10-CM

## 2021-06-07 PROCEDURE — 99999 PR OFFICE/OUTPT VISIT,PROCEDURE ONLY: CPT | Performed by: PHYSICIAN ASSISTANT

## 2021-06-07 NOTE — PROGRESS NOTES
Pt saw [employer] rep. Settings not changed. Device is off right now. Needs to see Dr Fay Aceves. appt on 6-16-21 @ 2pm per Kalina Gan.

## 2021-06-23 ENCOUNTER — TELEPHONE (OUTPATIENT)
Dept: FAMILY MEDICINE CLINIC | Age: 56
End: 2021-06-23

## 2021-06-23 NOTE — TELEPHONE ENCOUNTER
She is overdue for a mammogram. Left VM for her to return call so we can schedule this. Order and letter are also mailed to her with numbers to scheduling.

## 2021-06-23 NOTE — LETTER
[address] [address] [address] [facility] [address]  Dept: [phone]  Loc: [place], APRN - CNP        6/23/2021    Kasie Smith  [address]  [address] [facility] [address]      Dear Emmanuel Liao: This letter is a reminder that you may have diagnostic testing that has not been completed. It is important to your well-being that these test(s) are performed. Please call our office at Dept: [phone] for additional information on the outstanding tests or let us know if they have been completed so we may update your chart.     Sincerely,        DEYANIRA Cohen CNP

## 2021-07-14 ENCOUNTER — OFFICE VISIT (OUTPATIENT)
Dept: PSYCHIATRY | Age: 56
End: 2021-07-14
Payer: MEDICARE

## 2021-07-14 DIAGNOSIS — F31.81 BIPOLAR II DISORDER (HCC): Primary | ICD-10-CM

## 2021-07-14 DIAGNOSIS — F41.1 GAD (GENERALIZED ANXIETY DISORDER): ICD-10-CM

## 2021-07-14 PROCEDURE — 1036F TOBACCO NON-USER: CPT | Performed by: NURSE PRACTITIONER

## 2021-07-14 PROCEDURE — G8428 CUR MEDS NOT DOCUMENT: HCPCS | Performed by: NURSE PRACTITIONER

## 2021-07-14 PROCEDURE — 3017F COLORECTAL CA SCREEN DOC REV: CPT | Performed by: NURSE PRACTITIONER

## 2021-07-14 PROCEDURE — 99212 OFFICE O/P EST SF 10 MIN: CPT | Performed by: NURSE PRACTITIONER

## 2021-07-14 PROCEDURE — G8419 CALC BMI OUT NRM PARAM NOF/U: HCPCS | Performed by: NURSE PRACTITIONER

## 2021-07-14 RX ORDER — LAMOTRIGINE 200 MG/1
200 TABLET ORAL DAILY
Qty: 90 TABLET | Refills: 0 | Status: SHIPPED | OUTPATIENT
Start: 2021-07-14 | End: 2021-10-25

## 2021-07-14 RX ORDER — BUPROPION HYDROCHLORIDE 150 MG/1
450 TABLET ORAL EVERY MORNING
Qty: 270 TABLET | Refills: 0 | Status: SHIPPED | OUTPATIENT
Start: 2021-07-14 | End: 2021-09-25 | Stop reason: SDUPTHER

## 2021-07-14 NOTE — PROGRESS NOTES
Progress Note              7-     CC: Bipolar II D/O;  ZOE     HPI  Kraig Correia is seen for follow up and  medication management. States  meds continue to work \"alright\" Denies having  side effects.  .Denies having a rash. Good med compliance is still  reported. Reports last increased dose of Wellbutrin is helping with Depression. Denies mood swings. Reports anxiety is well controlled. Denies feelings of harm towards self or others. Reports still  dealing with  pain and feels this is still her primary issue. Reports younger sister passed June15th and reports doing \"ok\" with this and has appt for counseling with Dr Esteban Guidry on 8-6-21. Reports  remains  very supportive.  Reports they just got back from [facility] in [place]. States they will be taking trip to [facility] for vacation in 1 week. Reports appetite and sleep are \"ok\" States she spends time with her grandchildren.   . Labs reviewed draw March 2021  No critical abnormals noted.   Reports still  being on Valium Rxed by PCP for anxiety and sleep. Client advised again Valium would not be Rxed from this office.      Past Medical Hx:  Reports allergies to Tramadol. Codeine, Percocets, Vicodin  Reports having Hypothyroidism, Migraine Headaches, HTN. DDD, Asthma, Osteoarthritis  Reports partial hysterectomy 2001     Past Psychiatric Hx:  Denies any past psych hospitalizations. Denies any past suicidal gestures. Denies ever being under care of psychiatrist. Reports receives counseling at [facility]. Currently xanax Rxed by PCP and Valium Rxed by PCP. 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 MARITAL STATUS: Currently  2nd marriage. For 24 years. Reports relationship is good. OCCUPATION: Currently on Disability.  Last worked at [employer] left there 2015  LEVEL OF EDUCATION: Reports having associates degree in [employer]. LIVING SITUATION: [facility] [place]. Has 2 grown children. LEGAL:  Reports past 2 DUI's. Last was 1994     MSE:  Level of consciousness: Alert  Appearance:  fair grooming   Behavior/Motor: no abnormalities noted   Attitude toward examiner: cooperative   Speech: Normal volume, goal directed, NRR  Mood: Euthymic  Affect: Reactive  Thought processes: Linear and goal directed   Suicidal Ideation: Denies suicidal ideations  Homicidal ideation: Denies homicidal ideations  Delusions: No evidence of delusions is observed  Perceptual Disturbance: Denies AH/VH;  No evidence of psychosis is observed. Cognition: Oriented to person, place, time and situation   Concentration fair   Memory intact   Insight: Fair  Judgment: Fair     ROS:  Constitutional: Negative for fever, chills and fatigue. HENT: Negative for ear pain, congestion, rhinorrhea and neck pain. Eyes: Negative for pain and discharge. Respiratory: Negative for cough, chest tightness and wheezing. Repots being on Advair for asthma  Cardiovascular: Negative for chest pain, palpitations and leg swelling. Gastrointestinal: Negative for nausea, vomiting and diarrhea. Genitourinary: Negative for dysuria and frequency. Musculoskeletal: Reports still having chronic back and neck  pain. Being followed by Dr Brandi Montgomery. Skin: Negative for rash. Neurological: Negative for dizziness, weakness. Reports has migraine headaches     Impression:  Bipolar II D/O  ZOE     Plan:  Continue current meds:  Lamictal 200mg po q am    Wellbutrin XL 450mg po q am   Med education given.  Rhoda Later voiced understanding of education given.   Advised to keep  counseling appt with Dr Antonia Prater for August 6th   RTC 3 mos

## 2021-07-29 ENCOUNTER — TELEPHONE (OUTPATIENT)
Dept: FAMILY MEDICINE CLINIC | Age: 56
End: 2021-07-29

## 2021-07-29 DIAGNOSIS — M96.1 FAILED NECK SYNDROME: ICD-10-CM

## 2021-07-29 DIAGNOSIS — Z11.52 ENCOUNTER FOR SCREENING FOR COVID-19: Primary | ICD-10-CM

## 2021-07-29 DIAGNOSIS — F33.41 RECURRENT MAJOR DEPRESSIVE DISORDER, IN PARTIAL REMISSION (HCC): ICD-10-CM

## 2021-07-29 NOTE — TELEPHONE ENCOUNTER
----- Message from Marcella Lainez sent at 7/29/2021  4:15 PM EDT -----  Subject: Refill Request    QUESTIONS  Name of Medication? diazePAM (VALIUM) 5 MG tablet  Patient-reported dosage and instructions? as needed  How many days do you have left? 0  Preferred Pharmacy? [facility]  Pharmacy phone number (if available)? [phone]  ---------------------------------------------------------------------------  --------------  CALL BACK INFO  What is the best way for the office to contact you?  OK to leave message on   voicemail  Preferred Call Back Phone Number? [phone]

## 2021-07-29 NOTE — TELEPHONE ENCOUNTER
Going to [place], needs test done 72 hours prior to leaving. This is ordered per verbal order of Leslie Thomas.

## 2021-07-29 NOTE — TELEPHONE ENCOUNTER
----- Message from Laz Smith sent at 7/29/2021  4:16 PM EDT -----  Subject: Message to Provider    QUESTIONS  Information for Provider? Pt said she needs a covid test for travel wants   to come to office to get  ---------------------------------------------------------------------------  --------------  [address]  What is the best way for the office to contact you? OK to leave message on   voicemail  Preferred Call Back Phone Number? [phone]  ---------------------------------------------------------------------------  --------------  SCRIPT ANSWERS  Relationship to Patient?  Self

## 2021-07-30 RX ORDER — DIAZEPAM 5 MG/1
5 TABLET ORAL EVERY 8 HOURS PRN
Qty: 90 TABLET | Refills: 0 | Status: SHIPPED | OUTPATIENT
Start: 2021-07-30 | End: 2021-10-12 | Stop reason: SDUPTHER

## 2021-08-10 ENCOUNTER — OFFICE VISIT (OUTPATIENT)
Dept: UROLOGY | Age: 56
End: 2021-08-10
Payer: MEDICARE

## 2021-08-10 VITALS
HEIGHT: 60 IN | SYSTOLIC BLOOD PRESSURE: 122 MMHG | DIASTOLIC BLOOD PRESSURE: 78 MMHG | BODY MASS INDEX: 26.11 KG/M2 | WEIGHT: 133 LBS

## 2021-08-10 DIAGNOSIS — N39.46 MIXED STRESS AND URGE URINARY INCONTINENCE: Primary | ICD-10-CM

## 2021-08-10 DIAGNOSIS — R31.29 MICROHEMATURIA: ICD-10-CM

## 2021-08-10 DIAGNOSIS — N30.10 INTERSTITIAL CYSTITIS: ICD-10-CM

## 2021-08-10 PROCEDURE — G8419 CALC BMI OUT NRM PARAM NOF/U: HCPCS | Performed by: UROLOGY

## 2021-08-10 PROCEDURE — G8427 DOCREV CUR MEDS BY ELIG CLIN: HCPCS | Performed by: UROLOGY

## 2021-08-10 PROCEDURE — 99214 OFFICE O/P EST MOD 30 MIN: CPT | Performed by: UROLOGY

## 2021-08-10 PROCEDURE — 1036F TOBACCO NON-USER: CPT | Performed by: UROLOGY

## 2021-08-10 PROCEDURE — 3017F COLORECTAL CA SCREEN DOC REV: CPT | Performed by: UROLOGY

## 2021-08-10 RX ORDER — METRONIDAZOLE 250 MG/1
250 TABLET ORAL 3 TIMES DAILY
Qty: 21 TABLET | Refills: 0 | Status: SHIPPED | OUTPATIENT
Start: 2021-08-10 | End: 2021-08-17

## 2021-08-10 NOTE — PROGRESS NOTES
[id number] [address] Luther [address] [address]  Dept: [phone]  Dept Fax: [phone]: [address] Aw[address] Gutierrez, [address] [facility] Urology Office Note     Patient:  Grant Elena  YOB: 1965      The patient is a 49 y.o. female who presents today for evaluation of the following problems:   Chief Complaint   Patient presents with    Follow-up     Interstim check    Incontinence        HISTORY OF PRESENT ILLNESS:     OAB  interstim not working  Only worked immediately after surgery  Mixed incontinence? -- bladder sling in past    Interstitial cystitis   Denies pain at this time      Summary of Previous Records:  65-year-old white female returns today for a follow-up after her sacral nerve stimulator implantation. She states her life is completely different now. She has very good control of her bladder. He has no nocturia now. She has no urinary incontinence. She complains of a scant vaginal discharge and odor most likely Gardnerella vaginalis. I sent in a prescription for MetroGel which she will use vaginally nightly for 5 days. Keeps the sacral nerve stimulator at 1.8. Anything higher than that causes some radiculopathy down her right lower extremity. Her urine has a large amount of blood on dipstick which is not new and has been thoroughly evaluated. She does have a history of IC., but she does not want the DMSO cocktails. Return in 6 months. In excess of 15 minutes was spent with the patient discussing their medical history, treatment outcomes and possible treatment related side effects. Greater than 50 per cent of this time was spent in face to face discussion of current disease status and ongoing management.             Requested/reviewed records from DEYANIRA Metzger CNP office and/or outside physician/EMR    (Patient's old records have been requested, reviewed and pertinent findings summarized in today's note.)    Procedures Today: N/A    Last several PSA's:  No results found for: PSA    Last total testosterone:  No results found for: TESTOSTERONE    Urinalysis today:  No results found for this visit on 08/10/21. Last BUN and creatinine:  Lab Results   Component Value Date    BUN 15 03/26/2021     Lab Results   Component Value Date    CREATININE 0.6 03/26/2021       Imaging Reviewed during this Office Visit:   Clint Antoine MD independently reviewed the images and verified the radiology reports from:    XR SACRUM COCCYX (MIN 2 VIEWS)    Result Date: 4/21/2021  PROCEDURE: XR SACRUM COCCYX (MIN 2 VIEWS) CLINICAL INFORMATION: Large incontinence TECHNIQUE: 3 views of the sacrum and coccyx COMPARISON: None FINDINGS: There is no fracture or dislocation. A stimulation device overlies the right ilium. The lead extends superior to the right sacrum and travels caudally with the tip overlying the soft tissues anterior to the mid right sacrum. Multiple phleboliths are noted in the pelvis. 1. No sacrococcygeal fracture. 2. Stimulation device is above.  Final report electronically signed by Dr. Alejandra Flannery on 4/21/2021 4:48 PM      PAST MEDICAL, FAMILY AND SOCIAL HISTORY:  Past Medical History:   Diagnosis Date    Anxiety     Asthma     Autoimmune disease ([address]     Bipolar 1 disorder ([address]     Blood in urine     Bronchitis     Cataracts, bilateral     Degenerative cervical disc     Degenerative disc disease     Depression     Frequent headaches     Hypertension     Hypothyroidism     Interstitial cystitis     Iritis 2013    OU      Migraines     Osteopenia     PONV (postoperative nausea and vomiting)     Shortness of breath     Thyroid disease      Past Surgical History:   Procedure Laterality Date    BACK SURGERY      multiple injections and nerve blocks at cervical and lumbar    CERVICAL FUSION  11/09    c 4,5,6    CERVICAL FUSION  11/20/2015    CYSTOSCOPY N/A 4/25/2019 CYSTOSCOPY WITH HYDRODISTENTION WITH INSTILLATION OF DMSO AND MARCAINE performed by Drake Vaughn MD at [address] (W OR W/O BIOPSY)  2011    HC PAIN BLOCK Bilateral 11/6/2019    Third occipital nerve block bilateral. Left trigger point trapezius performed by Becca Pacheco MD at [address]  4/02    INCONTINENCE SURGERY      SPINAL CORD STIMULATOR IMPLANTATION N/A 11/20/2019    STAGE 1 / INSERTION OF INTERSTIM DEVICE performed by Drake Vaughn MD at [facility] N/A 12/4/2019    STAGE II STIMULATOR INSERTION performed by Drake Vaughn MD at [address] [place] [address] History   Problem Relation Age of Onset    Rheum Arthritis Mother     Thyroid Disease Mother     Colon Polyps Mother         esophageal polyps    Other Mother     High Blood Pressure Father     Diabetes Father     High Blood Pressure Sister     Thyroid Disease Brother     High Blood Pressure Brother     Liver Cancer Maternal Uncle     Cancer Maternal Grandfather         pancreatic cancer    Colon Cancer Neg Hx      Outpatient Medications Marked as Taking for the 8/10/21 encounter (Office Visit) with Sree Bishop MD   Medication Sig Dispense Refill    diazePAM (VALIUM) 5 MG tablet Take 1 tablet by mouth every 8 hours as needed for Anxiety or Sleep (spasms).  90 tablet 0    lamoTRIgine (LAMICTAL) 200 MG tablet Take 1 tablet by mouth daily 90 tablet 0    buPROPion (WELLBUTRIN XL) 150 MG extended release tablet Take 3 tablets by mouth every morning 270 tablet 0    levothyroxine (SYNTHROID) 88 MCG tablet Take 1 tablet by mouth daily 90 tablet 2    cetirizine (ZYRTEC) 10 MG tablet Take 1 tablet by mouth daily 30 tablet 5    albuterol sulfate HFA (PROAIR HFA) 108 (90 Base) MCG/ACT inhaler Inhale 2 puffs into the lungs every 6 hours as needed for Wheezing 1 Inhaler 5    cyclobenzaprine (FLEXERIL) 10 MG tablet Take 1 tablet by mouth nightly as needed for Muscle spasms 30 tablet 1    lisinopril (PRINIVIL;ZESTRIL) 20 MG tablet Take 1 tablet by mouth daily 30 tablet 5    hydrocortisone 2.5 % cream Apply topically 2 times daily. 1 Tube 1    [DISCONTINUED] lidocaine (XYLOCAINE) 2 % jelly Apply 2-3 times daily 1 Tube 3       Nurtec [rimegepant sulfate], Abilify [aripiprazole], Cephalexin, Codeine, Excedrin migraine [aspirin-acetaminophen-caffeine], Neurontin [gabapentin], Nucynta [tapentadol hcl er], Percocet [oxycodone-acetaminophen], Tramadol, Tylenol with codeine #3 [acetaminophen-codeine], and Vicodin [hydrocodone-acetaminophen]  Social History     Tobacco Use   Smoking Status Former Smoker    Types: Cigarettes    Quit date:     Years since quittin.6   Smokeless Tobacco Never Used   Tobacco Comment    1 pack per week intermit for 20 yrs      (If patient a smoker, smoking cessation counseling offered)   Social History     Substance and Sexual Activity   Alcohol Use No    Alcohol/week: 0.0 standard drinks       REVIEW OF SYSTEMS:  Constitutional: negative  Eyes: negative  Respiratory: negative  Cardiovascular: negative  Gastrointestinal: negative  Genitourinary: see HPI  Musculoskeletal: negative  Skin: negative   Neurological: negative  Hematological/Lymphatic: negative  Psychological: negative      Physical Exam:    This a 49 y.o. female  Vitals:    08/10/21 1100   BP: 122/78     Body mass index is 25.97 kg/m². Constitutional: Patient in no acute distress;         Assessment and Plan        1. Mixed stress and urge urinary incontinence    2. Interstitial cystitis    3. Microhematuria               Plan:       Microhematuria- persistent. Had cystoscopy with sarmina in past  IC- controlled for > 30+ years  Very frustrated at this time  Possibly stress incontinence component as well  interstim device not working. Tender to the touch.    Will give flagyl for infections    Cystoscopy, will fill/check detrusor function, check microhematuria, pelvic exam and check UH    Prescriptions Ordered:  No orders of the defined types were placed in this encounter. Orders Placed:  No orders of the defined types were placed in this encounter.            Melvin Bui MD

## 2021-08-10 NOTE — PROGRESS NOTES
Per Faustino Corderos, Interstim Rep, Impedence WNL. Questionable lead placement. Not ideal targeted responses. Amplitude higher than 2V on many programs. Program #4 getting close to anal area at 1.0V. Pt frustrated. Wanted interstim off.

## 2021-08-25 ENCOUNTER — OFFICE VISIT (OUTPATIENT)
Dept: PSYCHOLOGY | Age: 56
End: 2021-08-25
Payer: MEDICARE

## 2021-08-25 DIAGNOSIS — F43.21 GRIEF: ICD-10-CM

## 2021-08-25 DIAGNOSIS — F33.1 MAJOR DEPRESSIVE DISORDER, RECURRENT EPISODE, MODERATE WITH ANXIOUS DISTRESS (HCC): Primary | ICD-10-CM

## 2021-08-25 PROCEDURE — 1036F TOBACCO NON-USER: CPT | Performed by: PSYCHOLOGIST

## 2021-08-25 PROCEDURE — 90791 PSYCH DIAGNOSTIC EVALUATION: CPT | Performed by: PSYCHOLOGIST

## 2021-08-25 NOTE — PROGRESS NOTES
Behavioral Health Consultation/Psychotherapy Note  Shanta Baum. Darryn Self Psy.D. Visit Date:  8/25/2021    Patient:  Juvenal Meléndez  YOB: 1965  Chief Complaint:  New Patient, Depression, and Stress    Duration of session:  60 minutes      S:     Ct said she can't do PT b/c of the pain. She can do some things, but then she pays for it afterwards. Ct said she lost her father in Sept 2020, and her sister in June 2021. She was very close to her sister, and this has been very hard. She said she was the most loyal person. Her sister was 10 years younger than her. Her sister drank alcohol and \"did some crazy things\" which really upset her. Ct said her sister was a lesbian,  to a woman, and ct still has contact with her sister's partner to this day (they are like sisters). Ct still has her mother, but feels she can't talk to her the same way. Ct is  and can talk to her  some (he's had deaths in the family too). Ct said a year ago she got into an argument with her oldest son's wife over financial issues and a disagreement over her house she let them stay in. Since, her son, his wife and their granddaughter haven't had any contact with ct. She's angry at her son for not standing up to his wife. He didn't come to the funerals of his grandfather or his aunt. She's had a host of health issues that persist as well. She was told by pain management that there's nothing more that can be done for her. She has stopped doing some ministry and going to [employer], and she feels bad about that since she said erika is important in her life.         O:    Appearance    alert, cooperative, orientation, mild distress  Appetite normal  Sleep disturbance Yes  Loss of pleasure Yes  Speech    normal rate and well articulated  Mood    Depressed  Affect    depressed affect  Thought Content    intact and hopelessness  Thought Process    linear, goal directed and coherent  Insight    Good  Judgment Intact  Memory    recent and remote memory intact  Attention/Concentration    intact  Suicide Assessment    no suicidal ideation      A:    1. Major depressive disorder, recurrent episode, moderate with anxious distress ([facility]    2. Grief      Ct back in psychotherapy after over 5 years to deal with recent grief and depression. P:    Meet again in 4-6 weeks. All questions about treatment plan answered. Patient instructed to go immediately to the emergency room and/or call 911 if any suicidal or homicidal ideations. Patient stated understanding and is agreeable to treatment and crisis plan.           Provider Signature:  Electronically signed by Susie Solis PSYD on 8/25/2021 at 3:15 PM

## 2021-08-25 NOTE — PATIENT INSTRUCTIONS
Bereavement, Grief & Mourning        Bereavement is the state of having lost a significant other to death. Grief is the personal response to the loss. Mourning is the public expression of that loss. What is Normal Grief? Grief reactions vary depending on who we are, who we lost, our relationship with that person, the circumstances around their passing, and how much their loss affects our day-to-day functioning. Different people may express grief differently and you may even have different grief responses between one loss and another. Reactions to grief and loss include not just emotional symptoms, but also behavioral and physical symptoms. These reactions can often change over time. All are normal for a short period of time. Emotional Behavioral Physical   Shock, denial,                   numbness Crying unexpectedly Exhaustion/Fatigue      Sadness, anxiety, guilt,       fear Sleep changes (increase or decrease) Decreased energy        Anger (at others or        God), Not eating/Weight changes Memory problems        Irritability, frustration Withdrawing from others Stomach and intestinal upset    Restlessness, difficulty concentrating, trouble making decisions Pain and headaches     Symptoms that are not normal and may signal the need to talk to a professional include:  Use of drugs, alcohol, violence, and thoughts of killing oneself. The duration of grief varies from person to person. Current research shows that the average recovery time is 18 to 24 months. Also, grief reactions can be stronger around anniversary or other significant dates, such as the anniversary of the persons death, birthdays, and holidays. The Stages of Grief   Grief therapists often describe stages of grief outlined by the research of Dr. Feliciano Huber. These stages do not always go in order. You may move back and forth among some of the stages and may even skip some of them.   These stages are meant as a guide to help you understand your reactions and those of others who are grieving.  - Denial:  Denial (not acknowledging the loss) can help contain the shock of loss. Denial can act as a safety mechanism to block out grief until we are ready to handle it. - Sadness and depression:  Deep, intense grief and mourning appear during this stage. When the full understanding of our loss comes, it can seem overwhelming. During this stage, you may cry often and unexpectedly. You may not want to be around people or to do things that you normally enjoy. During this stage, it is best to remain as active as possible and to seek supportive people who will allow you to say what you need to or to cry when you need to. It is important to allow yourself to work through your full range and experience of emotions. - Anger:  Rage and anger can be intense toward the person who , toward friends and relatives, and even toward God. It is important to have an outlet to release anger through activities such as exercise, hobbies, or through therapy. Guilt, shame, and blame are feelings that need to be addressed, especially if it is toward you. - Acceptance: This stage includes coming to terms with the loss. It does not mean that you have found the answers to your questions or that you stop thinking about the person who is gone. It does signify a reinvestment in life and a willingness to readjust to your new circumstances while carrying the memory of your loved one with you. How to Help Yourself  1. Give yourself time to grieve. It is normal and important to express your grief and to work through the concerns that arise for you at this time. Stuffing your feelings may not be helpful and may delay or prolong your grief. 2. Find supportive people to reach out to during your grief. This is the time when the support of others may be the most helpful.   Dont be afraid to tell them how they can best help, even if it means just listening. It is often very helpful to talk about your loss with people who will allow you to express your emotions. 3. Take care of your health. Often after a loss, we stop doing the things we need to for health care, such as exercising, eating correctly, keeping Dr. appointments, or taking prescribed medications. If you are on a health care regimen, it is important to continue to adhere to your treatment. 4. Postpone major life changes. Give yourself time to adjust to your loss before making plans to change jobs, move or sell your home, remarry, etc.  Grief can sometimes cloud your judgment and ability to make decisions. 5. Consider keeping a journal.  It is often helpful to write or tell the story of your loss and what it means to you as a way to work through your feelings. 6. Participate in activities. Staying active through exercise, enjoyable activities, outings with supportive others, or even starting new hobbies can help us get through tough times while providing opportunities for constructive development and use of energy. 7. Find a way to memorialize your loved one. Planting a tree or garden in the name of your loved one, dedicating a work to their memory, contributing to a ely in their name, and other such activities can be helpful. 8. Consider joining grief-support groups or contacting a grief counselor for additional support and help. Remember that depressive symptoms (feeling sad) are a fundamental part of normal bereavement. Staying active and finding support from others can help you to work through the grief process. Myths and facts about grief  MYTH: The pain will go away faster if you ignore it. Fact: Trying to ignore your pain or keep it from surfacing will only make it worse in the long run. For real healing it is necessary to face your grief and actively deal with it. MYTH: Its important to be be strong in the face of loss.    Fact: Feeling sad, frightened, or lonely is a normal reaction to loss. Crying doesnt mean you are weak. You dont need to protect your family or friends by putting on a brave front. Showing your true feelings can help them and you. MYTH: If you dont cry, it means you arent sorry about the loss. Fact: Crying is a normal response to sadness, but its not the only one. Those who dont cry may feel the pain just as deeply as others. They may simply have other ways of showing it. MYTH: Grief should last about a year. Fact: There is no right or wrong time frame for grieving. How long it takes can differ from person to person. Source: [facility]       References  Manual ANGELINA Godwin, Derick Cintron T, DASHA Everett, Ines HC, & YESY Chang. (2005). Does widowhood affect memory performance of older persons? Psychological Medicine, 35(2), 217-226. Keara Faust, PROSPER Tate, & CECY Matias. (2005). Health behaviors associated with better quality of life for older bereaved persons. Journal of Palliative Medicine, 8(1), . Verona Cortez.  (2004). Working with grieving adults. Advances in Psychiatric Treatment, 10, 164-170. Severiano Avers, TORRI Thompson, Gali, [address] [place] [address], & Lida Arevalo. (2004). Life after death: Greif therapy after the suddent traumatic death of a family member. Perspectives in Psychiatric Care, 40(4), 519-244. Evy, [facility], , REGINALD Mcfarlane, & NAVI Ramirez.  (2000). Bereavement services in acute care settings. Death Studies, 24, 51-64. Nishant Hawley [facility] [place], [place]  (2000). Psychotherapy of traumatic grief:  A review of evidence for psychotherapeutic treatment. Death Studies, 24, 479-495. ALEXANDRIA Martinez. (2004). Connections between counseling theories and current theories of grief and mourning. Journal of Mental Health Counseling, 26(2), 125-145. Grief Tips  Help for Those Who Mourn    The following are many ideas to help people who are mourning a loved ones death.   Different kinds of losses dictate different responses, so not all of these ideas will suit everyone. Likewise, no two people grieve alike  what works for one may not work for another. Treat this list for what it is; a gathering of assorted suggestions that various people have tried with success. Perhaps what helped them will help you. The emphasis here is on specific, practical ideas. 1. Talk regularly with a friend. Talking with another about what you think and feel is one of the best things you can do for yourself. It helps relieve some of the pressure you may feel, it can give you a sense of perspective, and it keeps you in touch with others. Look for someone whos a good listener and who has a caring soul. Then speak whats on your mind and in your heart. If this feels one-sided let that be okay for this period of your life. Chances are the other person will find meaning in what theyre doing, and time will come when youll have the chance to be a good listener for someone else. Youll be a better listener then, if youre a good talker now. 2. Walk. Go for walks outside every day if you can. Dont overdo it, but walk briskly enough that it feels invigorating. Sometimes try walking slowly enough so you can look carefully at what you see. Observe what nature has to offer you, what it can teach you. Enjoy as much as you are able to of the sights and sounds that come your way. If you like, walk with another person. 3. Carry or wear a linking object. Carry something in your pocket or purse that reminds you of the one who   a keepsake they gave you perhaps, or small object they once carried or used or a memento you select for just this purpose. You might wear a piece of their jewelry in the same way. Whenever you want, reach for gaze upon this object and remember what it signifies. 4. Visit the grave. Not all people prefer to do this. But if it feels right to you, then do so.   Dont let others convince what was spoken or read, ask for the words. Whoever participated in that ritual will feel gratified that what they prepared was appreciated. Turn to these words whenever you want. Some people find these thoughts provide even more help weeks and months after the service. 10. Remember the serenity prayer. This prayer is attributed to theologian Gab Montelongo, but its actually an ancient  [facility]. It has brought comfort and support to many that have suffered various kinds of afflictions. 11. Create a memory area at home. In a space that feels appropriate, arrange a small table that honors the person: a framed photograph or two, perhaps a prized possession or award or something they created or something they loved. This might be placed on a small table, a mantel or a desk. Some people like to use a grouping of candles, representing not just the person who  but others who have  as well. In that case a variety of candles can be arranged each representing a unique life. 12. Drink water. Grieving people can easily become dehydrated. Crying can naturally lead to that. And with your normal routines turned upside down, you may simply not drink as much or as regularly as you did before this death. Make this a way you care for yourself. 13. Use your hands. Sometimes theres value in doing repetitive things with your hands, something you dont have to think about very much because it becomes second nature. Knitting and crocheting are like that. So are carving, woodworking, polishing, solving jigsaw puzzles, painting, braiding, shoveling, washing and countless other activities. 15. Give yourself respites from your grief. Just because youre grieving doesnt mean you must always be feeling sad or     forlorn. Theres value in sometimes consciously deciding that youll think about something else for awhile, or that youll do something youve always enjoyed doing.   Sometimes this happens naturally and its only later you realize that your grief has taken a back seat. Let it, this is not an indication you love that person any less or that youre forgetting them. Its a sign that youre human and you need relief from the unrelenting pressure. It can also be a healthy sign youre healing. 15. See a grief counselor. If youre concerned about how youre feeling and how well youre adapting make an appointment   with a counselor who specializes in grief. Often youll learn what you need both about grief and about yourself as a griever in only a few sessions. Ask questions of the counselor before you sign on. What specific training does he or she have? What accreditation? A person who is a family therapist or a psychologist doesnt necessarily understand the unique issues of someone in grief. 12. Begin your day with your loved one. If your grief is young, youll probably wake up thinking of that person anyway. So why not decide that youll include her or him from the start? Focus this time in a positive way. Bring to your mind fulfilling memories. Recall lessons that this person taught you, gifts he or she gave you. Think about how you can spend your day in ways that would be keeping in with your loved ones best self and with your best self. Then carry that best self with you through your day. 16. Invite some one to be your telephone nathan. If your grief and sadness hit you especially hard at times and you have no   one nearby to turn to, ask someone you trust to be your telephone nathan. Ask their permission for you to call them whenever you feel youre at loose ends, day or night. Then put their number beside your phone and call them if you need them. Dont abuse the privilege, of course. And covenant that some day it will be payback time  someday youll make yourself available to help someone else in the same way youve been helped.   That will help you accept the care youre receiving. 18. Avoid certain people if you must.  No one likes to be unfriendly or cold. But if there are people in your life who make it  very difficult for you to do your grieving then do what you can to stay out of their way. Some people may lecture you or belittle you. 23. Donate their possessions meaningfully. Whether you give your loved ones personal possessions to someone you know   or to a stranger, find ways to pass these things along so that others might benefit from them. Family members of friends might like to receive keepsakes. They or others might deserve tools, utensils, books or sporting equipment. frooly organizations can put clothes to good use. Some wish to do this quickly following the death, while others wish to wait awhile. 21. Donate in the others name. Honor the [employer] and spirit by giving a gift or gifts to a cause the other would   appreciate. A favorite ely? A local ? A building project? Extend that persons influence even further. 21. Create or commission a memory quilt. Sew or invite others to sew with you, or hire someone to sew for you. However you get it completed, put together a wall hanging or a bedroom quilt that remembers the important life events of the one who . Take your time doing this. Make it what it is, a labor of love. 22. Take a yoga class. People of almost any age can do yoga. More than conditioning your body, it helps you relax and focus your mind. It can be woven into a practice of mediation. Its a gentle art for that time in your life when you deserve gentleness all around you. 23. Connect on the Internet. If youre [employer], search the Internet. Dank Garcia find many resources for people in grief,   as well as the opportunity to chat with fellow grievers. You can link up with others without leaving your home.   Dank Garcia also find more to expand your horizons as a person who is remembering, how effectively youre coping, go to that person. Pose your questions, then listen to their responses. What you choose to do with that information will be up to you. 28. Vent your anger rather than hold it in. You may feel awkward being angry when youre grieving, but anger is a common reaction. The expression holds true: anger is best out floating rather than in bloating. Even if you feel a bit ashamed as you do it, find ways to get it out of your system. Ashley, even if its in an empty house. Cry. Hit something soft. Throw eggs at something hard. Vacuum up a storm. Resist the temptation to be proper. 29. Give thanks every day. Whatever has happened to you, you still have things to be thankful for. Perhaps its your memories, your remaining family, your support, your work; you own health  all sorts of things. Draw your attention to those parts of life that are worth appreciating, then appreciate them. 30. Monitor signs of dependency. While its normal to become more dependent upon others for awhile immediately after a death, it will not be helpful to continue in that role long-term. Watch for signs that youre prolonging your need for assistance. Congratulate yourself when you do things for yourself.

## 2021-08-30 ENCOUNTER — PROCEDURE VISIT (OUTPATIENT)
Dept: UROLOGY | Age: 56
End: 2021-08-30
Payer: MEDICARE

## 2021-08-30 ENCOUNTER — HOSPITAL ENCOUNTER (OUTPATIENT)
Age: 56
Discharge: HOME | End: 2021-08-30
Payer: MEDICARE

## 2021-08-30 VITALS — WEIGHT: 133.7 LBS | RESPIRATION RATE: 12 BRPM | BODY MASS INDEX: 26.25 KG/M2 | HEIGHT: 60 IN

## 2021-08-30 DIAGNOSIS — N39.46 MIXED STRESS AND URGE URINARY INCONTINENCE: Primary | ICD-10-CM

## 2021-08-30 DIAGNOSIS — N30.10 INTERSTITIAL CYSTITIS: ICD-10-CM

## 2021-08-30 DIAGNOSIS — R31.29 MICROHEMATURIA: ICD-10-CM

## 2021-08-30 LAB
BILIRUBIN URINE: NEGATIVE
BLOOD URINE, POC: ABNORMAL
CHARACTER, URINE: CLEAR
COLOR, URINE: YELLOW
GLUCOSE URINE: NEGATIVE MG/DL
INFLUENZA A: NOT DETECTED
INFLUENZA B: NOT DETECTED
KETONES, URINE: NEGATIVE
LEUKOCYTE CLUMPS, URINE: NEGATIVE
NITRITE, URINE: NEGATIVE
PH, URINE: 5 (ref 5–9)
PROTEIN, URINE: NEGATIVE MG/DL
SARS-COV-2 RNA, RT PCR: NOT DETECTED
SPECIFIC GRAVITY, URINE: 1.02 (ref 1–1.03)
UROBILINOGEN, URINE: 0.2 EU/DL (ref 0–1)

## 2021-08-30 PROCEDURE — 87636 SARSCOV2 & INF A&B AMP PRB: CPT

## 2021-08-30 PROCEDURE — 52000 CYSTOURETHROSCOPY: CPT | Performed by: UROLOGY

## 2021-08-30 PROCEDURE — 81003 URINALYSIS AUTO W/O SCOPE: CPT | Performed by: UROLOGY

## 2021-08-30 NOTE — PROGRESS NOTES
Cystoscopy Operative Note  Patient:  Lillian Watkins  MRN: [id number]  YOB: 1965    Date: 08/30/21  Surgeon: Cali Giles MD  Anesthesia: Urethral 2% Xylocaine  Indications: bladder pain, incontinence  Position: Dorsal Lithotomy    Findings:   The patient was prepped and draped in the usual sterile fashion. The cystoscope was advanced through the urethra and into the bladder. The bladder was thoroughly inspected and the following was noted:    Vagina: normal appearing vagina with normal color and discharge, no lesions  Residual Urine: none  Urethra: normal appearing urethra with no masses, tenderness or lesions  Bladder: No tumors or CIS noted. No bladder diverticulum. mild trabeculation noted. Bladder glomerulationis  Ureters: Clear efflux from both ureters. Orifices with normal configuration and location. The cystoscope was removed. The patient tolerated the procedure well. Very painful cystoscopy.  Cannot have intercourse with her   No mesh erosion  Pt with significant IC  Pain at insertion site of interstim -- wants it removed    Cystoscopy hydrodistension with dmso and botox 100 u and explant of interstim

## 2021-08-31 ENCOUNTER — TELEPHONE (OUTPATIENT)
Dept: UROLOGY | Age: 56
End: 2021-08-31

## 2021-08-31 DIAGNOSIS — N32.81 OAB (OVERACTIVE BLADDER): ICD-10-CM

## 2021-08-31 DIAGNOSIS — Z01.818 PRE-OP TESTING: ICD-10-CM

## 2021-08-31 DIAGNOSIS — N39.46 MIXED STRESS AND URGE URINARY INCONTINENCE: Primary | ICD-10-CM

## 2021-08-31 NOTE — TELEPHONE ENCOUNTER
SURGERY [address]  [address] [place] [address] [address] [place], [address]      Phone *[phone]   Surgical Scheduling Direct Line Phone *[phone] Fax *[phone]      Jamison  1965 female    [address]  [place] [address]  Marital Status: [facility] Phone: [phone]      Cell Phone:    Telephone Information:   Mobile [phone]          Surgeon: Dr. Marilynn Garg Surgery Date: 10/7/21   Time: 3:00 pm    Procedure: Cystocopy,Hydrodistention with DMSO, Bladder Botox 100 units,Interstim Removal     Diagnosis: Mixed Stress and urge urinary incontinence,Interstitial Cystitis    Important Medical History: In [facility]    Special Inst/Equip:     CPT Codes:    04186,07553,,78705  Latex Allergy: no     Cardiac Device:  no    Anesthesia:  General          Admission Type:  Same Day                        Admit Prior to Day of Surgery: no    Case Location:  Main OR            Preadmission Testing:  Phone Call          PAT Date and Time:______________________________________________________    PAT Confirmation #: ______________________________________________________    Post Op Visit: ___________________________________________________________    Need Preop Cardiac Clearance: no    Does Patient have Cardiologist/physician?      None    Surgery Confirmation #: __________________________________________________    : ________________________   Date: __________________________     [employer] Name: Medicare

## 2021-08-31 NOTE — TELEPHONE ENCOUNTER
Patient scheduled for surgery with Dr Ruthie Russo on 10/7/21. Surgery consent on arrival. Patient to do pre op urine culture on 9/23/21. Fasting labs,ekg and chest xray now. Surgery instructions mailed to the patient.

## 2021-08-31 NOTE — TELEPHONE ENCOUNTER
DO NOT TAKE ASPIRIN, PLAVIX, FISH OIL, COUMADIN, IBUPROFEN, MOTRIN-LIKE DRUGS AND ANY MULTIVITAMINS OR OVER THE COUNTER SUPPLEMENTS 5 DAYS PRIOR TO SURGERY. Amandeep Contreras 1965 Diagnosis:     Surgical Physician: Dr. Dima Brower have been scheduled for the procedure marked below:      Surgery: Cystocopy,Hydrodistention with DMSO, Bladder Botox 100 units,Interstim Removal         Date: 10/7/21     Anesthesia: Anesthesiologist (General/Spinal)     Place of Service: [facility] Second Floor Same Day Surgery         Arrive to same day surgery by:  1:00 pm  (Surgery time is subject to change)      INSTRUCTIONS AS MARKED BELOW:    1.  DO NOT eat or drink anything after midnight before surgery. 2.  We prefer you shower or bathe with an antibacterial soap (Dial) the morning of surgery. 3.  Please ensure to have a  with you to transport you home. 4.  Please bring a current medication list, photo ID and insurance card(s) with you  5. Okay to take Tylenol  6. If you take Glucophage, Metformin or Janumet, hold 48-hours prior to surgery  7. Take blood pressure or heart medication as directed, if taken in the morning take with a small sip of water  8. The office will call you in 1-2 days after your procedure to schedule a follow up. DATE SENSITIVE TESTING    Do the pre op fasting labs,ekg and chest xray NOW. Orders included    Do the pre op urine culture on 9/23/21.  Order included        Date: 8/31/2021

## 2021-09-03 ENCOUNTER — TELEPHONE (OUTPATIENT)
Dept: UROLOGY | Age: 56
End: 2021-09-03

## 2021-09-03 NOTE — TELEPHONE ENCOUNTER
Patient's surgery rescheduled with Dr Asia Cordova to 11/4/21. Arrival of 10:00 a.m. patient informed and voiced understanding.

## 2021-09-09 ENCOUNTER — HOSPITAL ENCOUNTER (OUTPATIENT)
Dept: GENERAL RADIOLOGY | Age: 56
Discharge: HOME | End: 2021-09-09
Payer: MEDICARE

## 2021-09-09 ENCOUNTER — HOSPITAL ENCOUNTER (OUTPATIENT)
Age: 56
Discharge: HOME | End: 2021-09-09
Payer: MEDICARE

## 2021-09-09 DIAGNOSIS — N32.81 OAB (OVERACTIVE BLADDER): ICD-10-CM

## 2021-09-09 DIAGNOSIS — N39.46 MIXED STRESS AND URGE URINARY INCONTINENCE: ICD-10-CM

## 2021-09-09 DIAGNOSIS — Z01.818 PRE-OP TESTING: ICD-10-CM

## 2021-09-09 LAB
ANION GAP SERPL CALCULATED.3IONS-SCNC: 10 MEQ/L (ref 8–16)
BASOPHILS # BLD: 0.9 %
BASOPHILS ABSOLUTE: 0 THOU/MM3 (ref 0–0.1)
BUN BLDV-MCNC: 16 MG/DL (ref 7–22)
CALCIUM SERPL-MCNC: 9.7 MG/DL (ref 8.5–10.5)
CHLORIDE BLD-SCNC: 107 MEQ/L (ref 98–111)
CO2: 25 MEQ/L (ref 23–33)
CREAT SERPL-MCNC: 0.6 MG/DL (ref 0.4–1.2)
EOSINOPHIL # BLD: 3.9 %
EOSINOPHILS ABSOLUTE: 0.2 THOU/MM3 (ref 0–0.4)
ERYTHROCYTE [DISTWIDTH] IN BLOOD BY AUTOMATED COUNT: 13.3 % (ref 11.5–14.5)
ERYTHROCYTE [DISTWIDTH] IN BLOOD BY AUTOMATED COUNT: 45.5 FL (ref 35–45)
GFR SERPL CREATININE-BSD FRML MDRD: > 90 ML/MIN/1.73M2
GLUCOSE BLD-MCNC: 95 MG/DL (ref 70–108)
HCT VFR BLD CALC: 41.7 % (ref 37–47)
HEMOGLOBIN: 13.4 GM/DL (ref 12–16)
IMMATURE GRANS (ABS): 0.01 THOU/MM3 (ref 0–0.07)
IMMATURE GRANULOCYTES: 0.2 %
LYMPHOCYTES # BLD: 28.4 %
LYMPHOCYTES ABSOLUTE: 1.5 THOU/MM3 (ref 1–4.8)
MCH RBC QN AUTO: 30 PG (ref 26–33)
MCHC RBC AUTO-ENTMCNC: 32.1 GM/DL (ref 32.2–35.5)
MCV RBC AUTO: 93.3 FL (ref 81–99)
MONOCYTES # BLD: 7.7 %
MONOCYTES ABSOLUTE: 0.4 THOU/MM3 (ref 0.4–1.3)
NUCLEATED RED BLOOD CELLS: 0 /100 WBC
PLATELET # BLD: 286 THOU/MM3 (ref 130–400)
PMV BLD AUTO: 10.4 FL (ref 9.4–12.4)
POTASSIUM SERPL-SCNC: 4.6 MEQ/L (ref 3.5–5.2)
RBC # BLD: 4.47 MILL/MM3 (ref 4.2–5.4)
SEG NEUTROPHILS: 58.9 %
SEGMENTED NEUTROPHILS ABSOLUTE COUNT: 3.2 THOU/MM3 (ref 1.8–7.7)
SODIUM BLD-SCNC: 142 MEQ/L (ref 135–145)
WBC # BLD: 5.4 THOU/MM3 (ref 4.8–10.8)

## 2021-09-09 PROCEDURE — 85025 COMPLETE CBC W/AUTO DIFF WBC: CPT

## 2021-09-09 PROCEDURE — 36415 COLL VENOUS BLD VENIPUNCTURE: CPT

## 2021-09-09 PROCEDURE — 93005 ELECTROCARDIOGRAM TRACING: CPT

## 2021-09-09 PROCEDURE — 80048 BASIC METABOLIC PNL TOTAL CA: CPT

## 2021-09-09 PROCEDURE — 71046 X-RAY EXAM CHEST 2 VIEWS: CPT

## 2021-09-10 LAB
EKG ATRIAL RATE: 58 BPM
EKG P AXIS: 75 DEGREES
EKG P-R INTERVAL: 160 MS
EKG Q-T INTERVAL: 444 MS
EKG QRS DURATION: 92 MS
EKG QTC CALCULATION (BAZETT): 435 MS
EKG R AXIS: 29 DEGREES
EKG T AXIS: 26 DEGREES
EKG VENTRICULAR RATE: 58 BPM

## 2021-09-23 ENCOUNTER — HOSPITAL ENCOUNTER (OUTPATIENT)
Age: 56
Discharge: HOME | End: 2021-09-23
Payer: MEDICARE

## 2021-09-23 DIAGNOSIS — Z01.818 PRE-OP TESTING: ICD-10-CM

## 2021-09-23 DIAGNOSIS — N39.46 MIXED STRESS AND URGE URINARY INCONTINENCE: ICD-10-CM

## 2021-09-23 DIAGNOSIS — N32.81 OAB (OVERACTIVE BLADDER): ICD-10-CM

## 2021-09-23 PROCEDURE — 87086 URINE CULTURE/COLONY COUNT: CPT

## 2021-09-24 LAB
ORGANISM: ABNORMAL
URINE CULTURE, ROUTINE: ABNORMAL

## 2021-09-24 NOTE — TELEPHONE ENCOUNTER
Ivan Casas called into the office stating that she needs a refill on her Wellbutrin Xl 150mg;#270 with 0 refills;last with a start date of 07/14/21. She reports that she has 2 days remaining. Medication is pending your approval for a 90 day supply with 0 refills; she is scheduled to return on 11/03/21. She also sees Dr. Travis Sutton as well.

## 2021-09-25 RX ORDER — BUPROPION HYDROCHLORIDE 150 MG/1
450 TABLET ORAL EVERY MORNING
Qty: 270 TABLET | Refills: 0 | Status: SHIPPED | OUTPATIENT
Start: 2021-09-25 | End: 2021-11-03 | Stop reason: SDUPTHER

## 2021-10-07 ENCOUNTER — TELEPHONE (OUTPATIENT)
Dept: UROLOGY | Age: 56
End: 2021-10-07

## 2021-10-07 DIAGNOSIS — N32.81 OAB (OVERACTIVE BLADDER): Primary | ICD-10-CM

## 2021-10-07 DIAGNOSIS — Z01.818 PRE-OP TESTING: ICD-10-CM

## 2021-10-07 DIAGNOSIS — R31.29 MICROHEMATURIA: ICD-10-CM

## 2021-10-07 NOTE — TELEPHONE ENCOUNTER
DO NOT TAKE ASPIRIN, PLAVIX, FISH OIL, COUMADIN, IBUPROFEN, MOTRIN-LIKE DRUGS AND ANY MULTIVITAMINS OR OVER THE COUNTER SUPPLEMENTS 5 DAYS PRIOR TO SURGERY.                [facility]      1965        Diagnosis:      Surgical Physician: Dr. Chelita Samuels have been scheduled for the procedure marked below:                  Surgery: Cystocopy,Hydrodistention with DMSO, Bladder Botox 100 units,Interstim Removal                                          Date: 11/4/21      Anesthesia: Anesthesiologist (General/Spinal)               Place of Service: [address] [place] [address] Second Floor Same Day Surgery                                                                            Arrive to same day surgery by:  10:00 am  (Surgery time is subject to change)                             INSTRUCTIONS AS MARKED BELOW:     1.  DO NOT eat or drink anything after midnight before surgery. 2.  We prefer you shower or bathe with an antibacterial soap (Dial) the morning of surgery. 3.  Please ensure to have a  with you to transport you home. 4.  Please bring a current medication list, photo ID and insurance card(s) with you  5. Okay to take Tylenol  6. If you take Glucophage, Metformin or Janumet, hold 48-hours prior to surgery  7. Take blood pressure or heart medication as directed, if taken in the morning take with a small sip of water  8. The office will call you in 1-2 days after your procedure to schedule a follow up.                                         DATE SENSITIVE TESTING       Do the pre op urine culture on 10/21/21.  Order included

## 2021-10-08 ENCOUNTER — TELEPHONE (OUTPATIENT)
Dept: FAMILY MEDICINE CLINIC | Age: 56
End: 2021-10-08

## 2021-10-08 NOTE — TELEPHONE ENCOUNTER
----- Message from Pollyann Kanner sent at 10/8/2021 12:06 PM EDT -----  Subject: Refill Request    QUESTIONS  Name of Medication? diazePAM (VALIUM) 5 MG tablet  Patient-reported dosage and instructions? take as needed   How many days do you have left? 3  Preferred Pharmacy? [facility]  Pharmacy phone number (if available)? [phone]  Additional Information for Provider? running out needs refill   ---------------------------------------------------------------------------  --------------  CALL BACK INFO  What is the best way for the office to contact you?  OK to leave message on   voicemail  Preferred Call Back Phone Number? [phone]

## 2021-10-08 NOTE — TELEPHONE ENCOUNTER
Last office visit 5/11/21   Patient due for follow up in order to receive further refills. Because this is a controlled medication, patient must be seen every 3 months.    Left voicemail for patient to call back and schedule appt

## 2021-10-12 ENCOUNTER — OFFICE VISIT (OUTPATIENT)
Dept: FAMILY MEDICINE CLINIC | Age: 56
End: 2021-10-12
Payer: MEDICARE

## 2021-10-12 VITALS
DIASTOLIC BLOOD PRESSURE: 80 MMHG | BODY MASS INDEX: 26.17 KG/M2 | WEIGHT: 134 LBS | TEMPERATURE: 98.1 F | SYSTOLIC BLOOD PRESSURE: 138 MMHG | HEART RATE: 72 BPM | RESPIRATION RATE: 16 BRPM

## 2021-10-12 DIAGNOSIS — F34.1 DYSTHYMIA: ICD-10-CM

## 2021-10-12 DIAGNOSIS — J45.20 MILD INTERMITTENT REACTIVE AIRWAY DISEASE WITHOUT COMPLICATION: ICD-10-CM

## 2021-10-12 DIAGNOSIS — F43.21 GRIEF: Primary | ICD-10-CM

## 2021-10-12 DIAGNOSIS — I10 ESSENTIAL HYPERTENSION: ICD-10-CM

## 2021-10-12 DIAGNOSIS — F33.41 RECURRENT MAJOR DEPRESSIVE DISORDER, IN PARTIAL REMISSION (HCC): ICD-10-CM

## 2021-10-12 DIAGNOSIS — Z13.220 SCREENING CHOLESTEROL LEVEL: ICD-10-CM

## 2021-10-12 DIAGNOSIS — G44.86 CERVICOGENIC HEADACHE: ICD-10-CM

## 2021-10-12 DIAGNOSIS — E03.9 HYPOTHYROIDISM, UNSPECIFIED TYPE: ICD-10-CM

## 2021-10-12 DIAGNOSIS — M96.1 FAILED NECK SYNDROME: ICD-10-CM

## 2021-10-12 PROCEDURE — 99214 OFFICE O/P EST MOD 30 MIN: CPT | Performed by: NURSE PRACTITIONER

## 2021-10-12 PROCEDURE — G8419 CALC BMI OUT NRM PARAM NOF/U: HCPCS | Performed by: NURSE PRACTITIONER

## 2021-10-12 PROCEDURE — 3017F COLORECTAL CA SCREEN DOC REV: CPT | Performed by: NURSE PRACTITIONER

## 2021-10-12 PROCEDURE — G8484 FLU IMMUNIZE NO ADMIN: HCPCS | Performed by: NURSE PRACTITIONER

## 2021-10-12 PROCEDURE — 1036F TOBACCO NON-USER: CPT | Performed by: NURSE PRACTITIONER

## 2021-10-12 PROCEDURE — G8427 DOCREV CUR MEDS BY ELIG CLIN: HCPCS | Performed by: NURSE PRACTITIONER

## 2021-10-12 RX ORDER — MONTELUKAST SODIUM 10 MG/1
10 TABLET ORAL DAILY
Qty: 30 TABLET | Refills: 5 | Status: SHIPPED | OUTPATIENT
Start: 2021-10-12

## 2021-10-12 RX ORDER — DIAZEPAM 5 MG/1
5 TABLET ORAL EVERY 8 HOURS PRN
Qty: 90 TABLET | Refills: 0 | Status: SHIPPED | OUTPATIENT
Start: 2021-10-12 | End: 2021-12-20 | Stop reason: SDUPTHER

## 2021-10-12 SDOH — ECONOMIC STABILITY: FOOD INSECURITY: WITHIN THE PAST 12 MONTHS, YOU WORRIED THAT YOUR FOOD WOULD RUN OUT BEFORE YOU GOT MONEY TO BUY MORE.: NEVER TRUE

## 2021-10-12 SDOH — ECONOMIC STABILITY: FOOD INSECURITY: WITHIN THE PAST 12 MONTHS, THE FOOD YOU BOUGHT JUST DIDN'T LAST AND YOU DIDN'T HAVE MONEY TO GET MORE.: NEVER TRUE

## 2021-10-12 ASSESSMENT — SOCIAL DETERMINANTS OF HEALTH (SDOH): HOW HARD IS IT FOR YOU TO PAY FOR THE VERY BASICS LIKE FOOD, HOUSING, MEDICAL CARE, AND HEATING?: NOT HARD AT ALL

## 2021-10-13 ENCOUNTER — PREP FOR PROCEDURE (OUTPATIENT)
Dept: UROLOGY | Age: 56
End: 2021-10-13

## 2021-10-13 RX ORDER — SODIUM CHLORIDE 9 MG/ML
INJECTION, SOLUTION INTRAVENOUS CONTINUOUS
Status: CANCELLED | OUTPATIENT
Start: 2021-11-04

## 2021-10-13 RX ORDER — CIPROFLOXACIN 2 MG/ML
400 INJECTION, SOLUTION INTRAVENOUS
Status: CANCELLED | OUTPATIENT
Start: 2021-11-04

## 2021-10-13 ASSESSMENT — ENCOUNTER SYMPTOMS
ABDOMINAL PAIN: 0
SHORTNESS OF BREATH: 0
BACK PAIN: 1
CHEST TIGHTNESS: 0
COUGH: 0
WHEEZING: 0
ABDOMINAL DISTENTION: 0

## 2021-10-13 NOTE — PROGRESS NOTES
[address] [place] [facility] [address]  Dept: [phone]  Dept Fax: [phone]  Loc: [phone]  PROGRESS NOTE      VisitDate: 10/12/2021    Jose Hughes is a 49 y.o. female who presents today for:     Chief Complaint   Patient presents with    Medication Refill     getting upset, thinks bp might be getting elevated--would like to restart singulair, works well for her    Weight Gain     weight gain due to grief         Subjective:  Patient presents for routine 3-month/medication refill of Valium. Patient also requesting to restart Singulair due to seasonal rhinitis/asthma. Patient also concerned about weight gain. Her anxiety/depression, bipolar disorder, asthma, failed back syndrome, chronic neck and back pain, depression, headaches, hypertension, hypothyroid, interstitial cystitis, migraines, cataracts. Reports mood stable. Reports that her Lamictal was recently increased per psychiatry. Denies fever, chest pain, shortness of breath abdominal pain edema. Denies any homicidal suicidal ideations. Patient reports that she has an upcoming bladder procedure for her interstitial cystitis per urology. Review of Systems   Constitutional: Negative for activity change, appetite change, chills, fatigue and fever. HENT: Positive for postnasal drip. Eyes: Negative for visual disturbance. Respiratory: Negative for cough, chest tightness, shortness of breath and wheezing. Cardiovascular: Negative for chest pain, palpitations and leg swelling. Gastrointestinal: Negative for abdominal distention and abdominal pain. Genitourinary: Positive for dysuria. Musculoskeletal: Positive for arthralgias, back pain and neck pain. Skin: Negative. Negative for rash. Neurological: Positive for headaches. Negative for dizziness and light-headedness. Hematological: Negative for adenopathy.    Psychiatric/Behavioral: Positive for decreased concentration and dysphoric mood. The patient is nervous/anxious. All other systems reviewed and are negative.     Past Medical History:   Diagnosis Date    Anxiety     Asthma     Autoimmune disease ([address]     Bipolar 1 disorder ([address]     Blood in urine     Bronchitis     Cataracts, bilateral     Degenerative cervical disc     Degenerative disc disease     Depression     Frequent headaches     Hypertension     Hypothyroidism     Interstitial cystitis     Iritis 2013    OU      Migraines     Osteopenia     PONV (postoperative nausea and vomiting)     Shortness of breath     Thyroid disease       Past Surgical History:   Procedure Laterality Date    BACK SURGERY      multiple injections and nerve blocks at cervical and lumbar    CERVICAL FUSION  11/09    c 4,5,6    CERVICAL FUSION  11/20/2015    CYSTOSCOPY N/A 4/25/2019    CYSTOSCOPY WITH HYDRODISTENTION WITH INSTILLATION OF DMSO AND MARCAINE performed by Lakisha Hawkins MD at [address] (W OR W/O BIOPSY)  2011    HC PAIN BLOCK Bilateral 11/6/2019    Third occipital nerve block bilateral. Left trigger point trapezius performed by Elisabeth Hurtado MD at [address] [facility]  4/02    [address] [place] [address] N/A 11/20/2019    STAGE 1 / INSERTION OF INTERSTIM DEVICE performed by Lakisha Hawkins MD at [address] [facility] N/A 12/4/2019    STAGE II STIMULATOR INSERTION performed by Lakisha Hawkins MD at [address] [facility] History   Problem Relation Age of Onset    Rheum Arthritis Mother     Thyroid Disease Mother     Colon Polyps Mother         esophageal polyps    Other Mother     High Blood Pressure Father     Diabetes Father     High Blood Pressure Sister     Thyroid Disease Brother     High Blood Pressure Brother     Liver Cancer Maternal Uncle     Cancer Maternal Grandfather pancreatic cancer    Colon Cancer Neg Hx      Social History     Tobacco Use    Smoking status: Former Smoker     Types: Cigarettes     Quit date:      Years since quittin.7    Smokeless tobacco: Never Used    Tobacco comment: 1 pack per week intermit for 20 yrs   Substance Use Topics    Alcohol use: No     Alcohol/week: 0.0 standard drinks      Current Outpatient Medications   Medication Sig Dispense Refill    diazePAM (VALIUM) 5 MG tablet Take 1 tablet by mouth every 8 hours as needed for Anxiety or Sleep (spasms). 90 tablet 0    montelukast (SINGULAIR) 10 MG tablet Take 1 tablet by mouth daily 30 tablet 5    buPROPion (WELLBUTRIN XL) 150 MG extended release tablet Take 3 tablets by mouth every morning 270 tablet 0    lamoTRIgine (LAMICTAL) 200 MG tablet Take 1 tablet by mouth daily 90 tablet 0    levothyroxine (SYNTHROID) 88 MCG tablet Take 1 tablet by mouth daily 90 tablet 2    cetirizine (ZYRTEC) 10 MG tablet Take 1 tablet by mouth daily 30 tablet 5    albuterol sulfate HFA (PROAIR HFA) 108 (90 Base) MCG/ACT inhaler Inhale 2 puffs into the lungs every 6 hours as needed for Wheezing 1 Inhaler 5    cyclobenzaprine (FLEXERIL) 10 MG tablet Take 1 tablet by mouth nightly as needed for Muscle spasms 30 tablet 1    hydrocortisone 2.5 % cream Apply topically 2 times daily. 1 Tube 1    lisinopril (PRINIVIL;ZESTRIL) 20 MG tablet Take 1 tablet by mouth daily (Patient not taking: Reported on 10/12/2021) 30 tablet 5     No current facility-administered medications for this visit.      Allergies   Allergen Reactions    Nurtec [Rimegepant Sulfate] Other (See Comments)     Makes her jittery    Abilify [Aripiprazole] Other (See Comments)     Vivid dreams    Cephalexin Nausea And Vomiting    Codeine Nausea And Vomiting    Excedrin Migraine [Aspirin-Acetaminophen-Caffeine] Nausea And Vomiting    Neurontin [Gabapentin] Anxiety    Nucynta [Tapentadol Hcl Er] Other (See Comments)     Terrible headache  Percocet [Oxycodone-Acetaminophen] Nausea And Vomiting    Tramadol Nausea And Vomiting    Tylenol With Codeine #3 [Acetaminophen-Codeine]      Nausea     Vicodin [Hydrocodone-Acetaminophen] Nausea And Vomiting     Health Maintenance   Topic Date Due    Hepatitis C screen  Never done    Pneumococcal 0-64 years Vaccine (1 of 2 - PPSV23) Never done    COVID-19 Vaccine (1) Never done    DTaP/Tdap/Td vaccine (1 - Tdap) Never done    Shingles Vaccine (1 of 2) Never done    A1C test (Diabetic or Prediabetic)  12/12/2017    Breast cancer screen  10/05/2019    Annual Wellness Visit (AWV)  Never done    Flu vaccine (1) Never done    TSH testing  03/26/2022    Potassium monitoring  09/09/2022    Creatinine monitoring  09/09/2022    Lipid screen  08/10/2025    Colon cancer screen colonoscopy  08/06/2030    HIV screen  Completed    Hepatitis A vaccine  Aged Out    Hepatitis B vaccine  Aged Out    Hib vaccine  Aged Out    Meningococcal (ACWY) vaccine  Aged Out         Objective:     Physical Exam  Vitals and nursing note reviewed. Constitutional:       Appearance: Normal appearance. She is well-developed and normal weight. HENT:      Head: Normocephalic. Right Ear: Tympanic membrane and ear canal normal.      Left Ear: Tympanic membrane and ear canal normal.      Nose: Mucosal edema present. Mouth/Throat:      Pharynx: Oropharynx is clear. Eyes:      Extraocular Movements: Extraocular movements intact. Conjunctiva/sclera: Conjunctivae normal.   Cardiovascular:      Rate and Rhythm: Normal rate and regular rhythm. Pulses: Normal pulses. Heart sounds: Normal heart sounds. Pulmonary:      Effort: Pulmonary effort is normal.      Breath sounds: Wheezing present. Abdominal:      General: Bowel sounds are normal.      Palpations: Abdomen is soft. Musculoskeletal:      Cervical back: Neck supple. No deformity. Spinous process tenderness and muscular tenderness present. Decreased range of motion. Thoracic back: Tenderness and bony tenderness present. No deformity. Decreased range of motion. Skin:     General: Skin is warm and dry. Neurological:      General: No focal deficit present. Mental Status: She is alert and oriented to person, place, and time. Comments: Equal strength of upper extremities bilaterally. Equal strength lower extremities bilaterally   Psychiatric:         Mood and Affect: Mood normal.         Thought Content: Thought content normal.         Judgment: Judgment normal.       /80 (Site: Right Upper Arm)   Pulse 72   Temp 98.1 °F (36.7 °C) (Oral)   Resp 16   Wt 134 lb (60.8 kg)   BMI 26.17 kg/m²       Impression/Plan:  1. Grief    2. Failed neck syndrome    3. Recurrent major depressive disorder, in partial remission ([facility]    4. Dysthymia    5. Cervicogenic headache    6. Hypothyroidism, unspecified type    7. Essential hypertension    8. Mild intermittent reactive airway disease without complication    9. Screening cholesterol level      Requested Prescriptions     Signed Prescriptions Disp Refills    diazePAM (VALIUM) 5 MG tablet 90 tablet 0     Sig: Take 1 tablet by mouth every 8 hours as needed for Anxiety or Sleep (spasms).     montelukast (SINGULAIR) 10 MG tablet 30 tablet 5     Sig: Take 1 tablet by mouth daily     Orders Placed This Encounter   Procedures    CBC Auto Differential     Standing Status:   Future     Standing Expiration Date:   10/12/2022    Comprehensive Metabolic Panel     Standing Status:   Future     Standing Expiration Date:   10/12/2022    Lipid Panel     Standing Status:   Future     Standing Expiration Date:   10/12/2022     Order Specific Question:   Is Patient Fasting?/# of Hours     Answer:   yes/12    T4, Free     Standing Status:   Future     Standing Expiration Date:   10/12/2022    TSH without Reflex     Standing Status:   Future     Standing Expiration Date:   10/12/2022       Patient giveneducational materials - see patient instructions. Discussed use, benefit, and side effects of prescribed medications. All patient questions answered. Pt voiced understanding. Reviewed health maintenance. Patient agreedwith treatment plan. Follow up as directed. BC, CMP, FLP, TSH free T4 ordered. Valium refilled 5 mg every 8 as needed #90. OARRS report reviewed. Singular 10 mg 1 p.o. daily. Follow-up 3 months. New consultation with psychiatry and urology.        Electronically signed by Gerhardt Sawyer, APRN - CNP on 10/13/2021 at 11:43 AM

## 2021-10-25 ENCOUNTER — HOSPITAL ENCOUNTER (OUTPATIENT)
Age: 56
Discharge: HOME | End: 2021-10-25
Payer: MEDICARE

## 2021-10-25 DIAGNOSIS — R31.29 MICROHEMATURIA: ICD-10-CM

## 2021-10-25 DIAGNOSIS — N32.81 OAB (OVERACTIVE BLADDER): ICD-10-CM

## 2021-10-25 DIAGNOSIS — Z01.818 PRE-OP TESTING: ICD-10-CM

## 2021-10-25 PROCEDURE — 87086 URINE CULTURE/COLONY COUNT: CPT

## 2021-10-25 RX ORDER — LAMOTRIGINE 200 MG/1
TABLET ORAL
Qty: 90 TABLET | Refills: 0 | Status: SHIPPED | OUTPATIENT
Start: 2021-10-25 | End: 2021-11-03 | Stop reason: SDUPTHER

## 2021-10-25 NOTE — TELEPHONE ENCOUNTER
[facility] is requesting a medication refill on Anastasiya's behalf for Lamictal 200mg;#90 with 0 refills;last with a start date of 07/14/21. Medication is pending your approval for a 90 day supply with 0 refills. She was last seen on 07/14/21 and is scheduled to return on 11/03/21.

## 2021-10-26 LAB
ORGANISM: ABNORMAL
URINE CULTURE, ROUTINE: ABNORMAL

## 2021-10-28 ENCOUNTER — TELEPHONE (OUTPATIENT)
Dept: UROLOGY | Age: 56
End: 2021-10-28

## 2021-10-28 NOTE — PROGRESS NOTES
In preparation for their surgical procedure above patient was screened for Obstructive Sleep Apnea (DELLA) using the STOP-Bang Questionnaire by the Pre-Admission Testing department. This is a pre-surgical screening tool for patient safety and serves as a recommendation, this WILL NOT cause cancellation of surgery. STOP-Bang Questionnaire  * Do you currently see a pulmonologist?  No     If yes STOP, do not complete. Patient follows with Dr.     1.  Do you snore loudly (able to be heard in the next room)? No    2. Do you often feel tired or sleepy during the daytime? No       3. Has anyone ever told you that you stop breathing during your sleep? No    4. Do you have or are you being treated for high blood pressure? Yes      5. BMI more than 35? BMI (Calculated): 26        No    6. Age over 48 years? 49 y.o. Yes    7. Neck Circumference greater than 17 inches for male or 16 inches for female? Measured           (visits only)            Not Applicable    8. Gender Male? No      TOTAL SCORE: 2    DELLA - Low Risk : Yes to 0 - 2 questions  DELLA - Intermediate Risk : Yes to 3 - 4 questions  DELLA - High Risk : Yes to 5 - 8 questions    Adapted from:   STOP Questionnaire: A Tool to Screen Patients for Obstructive Sleep Apnea   [facility] Alexis Pemberton M.B.B.S., Butch Osgood, M.D., Terrell Hannon. Noe Draper, Ph.D., KAY Kennedy.B.B.S., Derick Gilbert, M.Sc., Shamar Mccall M.D., Dennis Tolbert. [facility] Singh[facility]    Anesthesiology 2008; [id number] Copyright 2008, the [address] of Anesthesiologists, [facility].   ----------------------------------------------------------------------------------------------------------------

## 2021-10-28 NOTE — PROGRESS NOTES
Covid screening questionnaire complete and negative for symptoms or exposure see chart for documentation.   Please notify your doctor if you develop any signs of illness  Please wear a mask when you come to the hospital    Following instructions given to patient, who states understanding:    NPO after midnight  Bring insurance info and 's license  Wear comfortable clean clothing  Do not bring jewelry   Shower night before and morning of surgery with a liquid antibacterial soap  Bring medications in original bottles  Follow all instructions given by your physician   needed at discharge  Call [phone] for any questions  Report to [facility] on 2nd floor  If you would become ill prior to surgery, please call the surgeon  May have a visitor with you, we request that you limit to 2 visitors in pre-op area  Please bring and wear mask

## 2021-10-28 NOTE — PROGRESS NOTES
PAT call attempted, patient unavailable, left message to please call us back at your earliest convenience; [phone]

## 2021-10-28 NOTE — TELEPHONE ENCOUNTER
Patient notified of new surgery arrival time. Patient is to be at [address] [place] [address] Same day surgery by  6:30 AM   on  11/4/21    Patient reminded to have nothing to eat or drink after midnight. Patient voiced understanding.

## 2021-11-03 ENCOUNTER — VIRTUAL VISIT (OUTPATIENT)
Dept: PSYCHIATRY | Age: 56
End: 2021-11-03
Payer: MEDICARE

## 2021-11-03 ENCOUNTER — ANESTHESIA EVENT (OUTPATIENT)
Dept: OPERATING ROOM | Age: 56
End: 2021-11-03
Payer: MEDICARE

## 2021-11-03 DIAGNOSIS — F41.1 GAD (GENERALIZED ANXIETY DISORDER): ICD-10-CM

## 2021-11-03 DIAGNOSIS — F31.81 BIPOLAR II DISORDER (HCC): Primary | ICD-10-CM

## 2021-11-03 PROCEDURE — G8427 DOCREV CUR MEDS BY ELIG CLIN: HCPCS | Performed by: NURSE PRACTITIONER

## 2021-11-03 PROCEDURE — G8484 FLU IMMUNIZE NO ADMIN: HCPCS | Performed by: NURSE PRACTITIONER

## 2021-11-03 PROCEDURE — 1036F TOBACCO NON-USER: CPT | Performed by: NURSE PRACTITIONER

## 2021-11-03 PROCEDURE — 99212 OFFICE O/P EST SF 10 MIN: CPT | Performed by: NURSE PRACTITIONER

## 2021-11-03 PROCEDURE — 3017F COLORECTAL CA SCREEN DOC REV: CPT | Performed by: NURSE PRACTITIONER

## 2021-11-03 PROCEDURE — G8419 CALC BMI OUT NRM PARAM NOF/U: HCPCS | Performed by: NURSE PRACTITIONER

## 2021-11-03 RX ORDER — LAMOTRIGINE 200 MG/1
200 TABLET ORAL DAILY
Qty: 90 TABLET | Refills: 0 | Status: SHIPPED | OUTPATIENT
Start: 2021-11-03 | End: 2022-04-06 | Stop reason: SDUPTHER

## 2021-11-03 RX ORDER — BUPROPION HYDROCHLORIDE 150 MG/1
450 TABLET ORAL EVERY MORNING
Qty: 270 TABLET | Refills: 0 | Status: SHIPPED | OUTPATIENT
Start: 2021-11-03 | End: 2022-03-28 | Stop reason: SDUPTHER

## 2021-11-03 NOTE — PROGRESS NOTES
Vicodin  Reports having Hypothyroidism, Migraine Headaches, HTN. DDD, Asthma, Osteoarthritis  Reports partial hysterectomy 2001     Past Psychiatric Hx:  Denies any past psych hospitalizations. Denies any past suicidal gestures. Denies ever being under care of psychiatrist. Reports receives counseling at [facility]. Currently xanax Rxed by PCP and Valium Rxed by PCP. 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 MARITAL STATUS: Currently  2nd marriage. For 24 years. Reports relationship is good. OCCUPATION: Currently on Disability. Last worked at [employer] left there 2015  [address]: Reports having associates degree in [employer]. LIVING SITUATION: Lives with  in [address] [place] [address]. Has 2 grown children. LEGAL:  Reports past 2 DUI's. Last was 1994     MSE:  Level of consciousness: Alert  Appearance:  fair grooming   Behavior/Motor: no abnormalities noted   Attitude toward examiner: cooperative   Speech: Normal volume, goal directed, NRR  Mood: Euthymic  Affect: Reactive  Thought processes: Linear and goal directed   Suicidal Ideation: Denies suicidal ideations  Homicidal ideation: Denies homicidal ideations  Delusions: No evidence of delusions is observed  Perceptual Disturbance: Denies AH/VH;  No evidence of psychosis is observed. Cognition: Oriented to person, place, time and situation   Concentration fair   Memory intact   Insight: Fair  Judgment: Fair     ROS:  Constitutional: Negative for fever, chills and fatigue. HENT: Negative for ear pain, congestion, rhinorrhea and neck pain. Eyes: Negative for pain and discharge. Respiratory: Negative for cough, chest tightness and wheezing.  Repots being on Advair for asthma  Cardiovascular: Negative for chest pain, palpitations and leg swelling. Gastrointestinal: Negative for nausea, vomiting and diarrhea. Genitourinary: Reports will have bladder surgery tomorrow  Musculoskeletal: Reports still having chronic back and neck  pain. Being followed by Dr Mercy Hathawya. Skin: Negative for rash. Neurological: Negative for dizziness, weakness. Reports has migraine headaches     Impression:  Bipolar II D/O  ZOE     Plan:  Continue current meds:  Lamictal 200mg po q am    Wellbutrin XL 450mg po q am   Med education given.  Yasmine Reyna voiced understanding of education given.   Advised to continue  counseling  with Dr Anika Gong mos

## 2021-11-04 ENCOUNTER — HOSPITAL ENCOUNTER (OUTPATIENT)
Age: 56
Setting detail: OUTPATIENT SURGERY
Discharge: HOME | End: 2021-11-04
Attending: UROLOGY | Admitting: UROLOGY
Payer: MEDICARE

## 2021-11-04 ENCOUNTER — ANESTHESIA (OUTPATIENT)
Dept: OPERATING ROOM | Age: 56
End: 2021-11-04
Payer: MEDICARE

## 2021-11-04 VITALS
DIASTOLIC BLOOD PRESSURE: 60 MMHG | HEIGHT: 60 IN | RESPIRATION RATE: 20 BRPM | TEMPERATURE: 96.2 F | BODY MASS INDEX: 26.11 KG/M2 | WEIGHT: 133 LBS | OXYGEN SATURATION: 100 % | HEART RATE: 69 BPM | SYSTOLIC BLOOD PRESSURE: 116 MMHG

## 2021-11-04 VITALS — OXYGEN SATURATION: 100 % | TEMPERATURE: 98.6 F | DIASTOLIC BLOOD PRESSURE: 92 MMHG | SYSTOLIC BLOOD PRESSURE: 145 MMHG

## 2021-11-04 DIAGNOSIS — G89.18 POSTOPERATIVE PAIN: Primary | ICD-10-CM

## 2021-11-04 PROCEDURE — 2580000003 HC RX 258: Performed by: UROLOGY

## 2021-11-04 PROCEDURE — 6370000000 HC RX 637 (ALT 250 FOR IP)

## 2021-11-04 PROCEDURE — 3700000001 HC ADD 15 MINUTES (ANESTHESIA): Performed by: UROLOGY

## 2021-11-04 PROCEDURE — 3600000003 HC SURGERY LEVEL 3 BASE: Performed by: UROLOGY

## 2021-11-04 PROCEDURE — 6360000002 HC RX W HCPCS

## 2021-11-04 PROCEDURE — 3700000000 HC ANESTHESIA ATTENDED CARE: Performed by: UROLOGY

## 2021-11-04 PROCEDURE — 7100000000 HC PACU RECOVERY - FIRST 15 MIN: Performed by: UROLOGY

## 2021-11-04 PROCEDURE — 2500000003 HC RX 250 WO HCPCS: Performed by: UROLOGY

## 2021-11-04 PROCEDURE — 7100000011 HC PHASE II RECOVERY - ADDTL 15 MIN: Performed by: UROLOGY

## 2021-11-04 PROCEDURE — 88300 SURGICAL PATH GROSS: CPT

## 2021-11-04 PROCEDURE — 7100000010 HC PHASE II RECOVERY - FIRST 15 MIN: Performed by: UROLOGY

## 2021-11-04 PROCEDURE — 6360000002 HC RX W HCPCS: Performed by: STUDENT IN AN ORGANIZED HEALTH CARE EDUCATION/TRAINING PROGRAM

## 2021-11-04 PROCEDURE — 3600000013 HC SURGERY LEVEL 3 ADDTL 15MIN: Performed by: UROLOGY

## 2021-11-04 PROCEDURE — 6360000002 HC RX W HCPCS: Performed by: UROLOGY

## 2021-11-04 PROCEDURE — 2500000003 HC RX 250 WO HCPCS

## 2021-11-04 PROCEDURE — 2709999900 HC NON-CHARGEABLE SUPPLY: Performed by: UROLOGY

## 2021-11-04 PROCEDURE — 7100000001 HC PACU RECOVERY - ADDTL 15 MIN: Performed by: UROLOGY

## 2021-11-04 RX ORDER — LIDOCAINE HYDROCHLORIDE 20 MG/ML
INJECTION, SOLUTION INTRAVENOUS PRN
Status: DISCONTINUED | OUTPATIENT
Start: 2021-11-04 | End: 2021-11-04 | Stop reason: SDUPTHER

## 2021-11-04 RX ORDER — FENTANYL CITRATE 50 UG/ML
50 INJECTION, SOLUTION INTRAMUSCULAR; INTRAVENOUS EVERY 5 MIN PRN
Status: DISCONTINUED | OUTPATIENT
Start: 2021-11-04 | End: 2021-11-04 | Stop reason: HOSPADM

## 2021-11-04 RX ORDER — HYDROCODONE BITARTRATE AND ACETAMINOPHEN 5; 325 MG/1; MG/1
TABLET ORAL
Status: COMPLETED
Start: 2021-11-04 | End: 2021-11-04

## 2021-11-04 RX ORDER — ONDANSETRON 2 MG/ML
4 INJECTION INTRAMUSCULAR; INTRAVENOUS
Status: COMPLETED | OUTPATIENT
Start: 2021-11-04 | End: 2021-11-04

## 2021-11-04 RX ORDER — ONDANSETRON 2 MG/ML
INJECTION INTRAMUSCULAR; INTRAVENOUS PRN
Status: DISCONTINUED | OUTPATIENT
Start: 2021-11-04 | End: 2021-11-04 | Stop reason: SDUPTHER

## 2021-11-04 RX ORDER — HYDROCODONE BITARTRATE AND ACETAMINOPHEN 5; 325 MG/1; MG/1
1 TABLET ORAL EVERY 6 HOURS PRN
Qty: 18 TABLET | Refills: 0 | Status: SHIPPED | OUTPATIENT
Start: 2021-11-04 | End: 2021-11-09

## 2021-11-04 RX ORDER — CEFAZOLIN SODIUM 2 G/100ML
2000 INJECTION, SOLUTION INTRAVENOUS
Status: DISCONTINUED | OUTPATIENT
Start: 2021-11-04 | End: 2021-11-04

## 2021-11-04 RX ORDER — FENTANYL CITRATE 50 UG/ML
INJECTION, SOLUTION INTRAMUSCULAR; INTRAVENOUS
Status: COMPLETED
Start: 2021-11-04 | End: 2021-11-04

## 2021-11-04 RX ORDER — PROPOFOL 10 MG/ML
INJECTION, EMULSION INTRAVENOUS PRN
Status: DISCONTINUED | OUTPATIENT
Start: 2021-11-04 | End: 2021-11-04 | Stop reason: SDUPTHER

## 2021-11-04 RX ORDER — SUCCINYLCHOLINE/SOD CL,ISO/PF 200MG/10ML
SYRINGE (ML) INTRAVENOUS PRN
Status: DISCONTINUED | OUTPATIENT
Start: 2021-11-04 | End: 2021-11-04 | Stop reason: SDUPTHER

## 2021-11-04 RX ORDER — MIDAZOLAM HYDROCHLORIDE 1 MG/ML
INJECTION INTRAMUSCULAR; INTRAVENOUS PRN
Status: DISCONTINUED | OUTPATIENT
Start: 2021-11-04 | End: 2021-11-04 | Stop reason: SDUPTHER

## 2021-11-04 RX ORDER — ONDANSETRON 2 MG/ML
INJECTION INTRAMUSCULAR; INTRAVENOUS
Status: COMPLETED
Start: 2021-11-04 | End: 2021-11-04

## 2021-11-04 RX ORDER — CIPROFLOXACIN 500 MG/1
500 TABLET, FILM COATED ORAL 2 TIMES DAILY
Qty: 10 TABLET | Refills: 0 | Status: SHIPPED | OUTPATIENT
Start: 2021-11-04 | End: 2021-11-09

## 2021-11-04 RX ORDER — LIDOCAINE HYDROCHLORIDE 20 MG/ML
INJECTION, SOLUTION EPIDURAL; INFILTRATION; INTRACAUDAL; PERINEURAL PRN
Status: DISCONTINUED | OUTPATIENT
Start: 2021-11-04 | End: 2021-11-04 | Stop reason: ALTCHOICE

## 2021-11-04 RX ORDER — FENTANYL CITRATE 50 UG/ML
25 INJECTION, SOLUTION INTRAMUSCULAR; INTRAVENOUS EVERY 5 MIN PRN
Status: DISCONTINUED | OUTPATIENT
Start: 2021-11-04 | End: 2021-11-04 | Stop reason: HOSPADM

## 2021-11-04 RX ORDER — DIPHENHYDRAMINE HYDROCHLORIDE 50 MG/ML
12.5 INJECTION INTRAMUSCULAR; INTRAVENOUS
Status: DISCONTINUED | OUTPATIENT
Start: 2021-11-04 | End: 2021-11-04 | Stop reason: HOSPADM

## 2021-11-04 RX ORDER — PROMETHAZINE HYDROCHLORIDE 25 MG/ML
6.25 INJECTION, SOLUTION INTRAMUSCULAR; INTRAVENOUS
Status: DISCONTINUED | OUTPATIENT
Start: 2021-11-04 | End: 2021-11-04 | Stop reason: HOSPADM

## 2021-11-04 RX ORDER — HEPARIN SODIUM 10000 [USP'U]/ML
INJECTION, SOLUTION INTRAVENOUS; SUBCUTANEOUS PRN
Status: DISCONTINUED | OUTPATIENT
Start: 2021-11-04 | End: 2021-11-04 | Stop reason: ALTCHOICE

## 2021-11-04 RX ORDER — SODIUM CHLORIDE 9 MG/ML
INJECTION, SOLUTION INTRAVENOUS CONTINUOUS
Status: DISCONTINUED | OUTPATIENT
Start: 2021-11-04 | End: 2021-11-04 | Stop reason: HOSPADM

## 2021-11-04 RX ORDER — SODIUM CHLORIDE 9 MG/ML
INJECTION, SOLUTION INTRAVENOUS CONTINUOUS
Status: DISCONTINUED | OUTPATIENT
Start: 2021-11-04 | End: 2021-11-04

## 2021-11-04 RX ORDER — MEPERIDINE HYDROCHLORIDE 25 MG/ML
12.5 INJECTION INTRAMUSCULAR; INTRAVENOUS; SUBCUTANEOUS EVERY 5 MIN PRN
Status: DISCONTINUED | OUTPATIENT
Start: 2021-11-04 | End: 2021-11-04 | Stop reason: HOSPADM

## 2021-11-04 RX ORDER — HYDROCODONE BITARTRATE AND ACETAMINOPHEN 5; 325 MG/1; MG/1
1 TABLET ORAL ONCE
Status: COMPLETED | OUTPATIENT
Start: 2021-11-04 | End: 2021-11-04

## 2021-11-04 RX ORDER — CEFAZOLIN SODIUM 2 G/100ML
INJECTION, SOLUTION INTRAVENOUS
Status: DISCONTINUED
Start: 2021-11-04 | End: 2021-11-04

## 2021-11-04 RX ORDER — MORPHINE SULFATE 2 MG/ML
INJECTION, SOLUTION INTRAMUSCULAR; INTRAVENOUS
Status: DISCONTINUED
Start: 2021-11-04 | End: 2021-11-04 | Stop reason: WASHOUT

## 2021-11-04 RX ORDER — TRIAMCINOLONE ACETONIDE 40 MG/ML
INJECTION, SUSPENSION INTRA-ARTICULAR; INTRAMUSCULAR PRN
Status: DISCONTINUED | OUTPATIENT
Start: 2021-11-04 | End: 2021-11-04 | Stop reason: ALTCHOICE

## 2021-11-04 RX ORDER — HYDRALAZINE HYDROCHLORIDE 20 MG/ML
5 INJECTION INTRAMUSCULAR; INTRAVENOUS EVERY 10 MIN PRN
Status: DISCONTINUED | OUTPATIENT
Start: 2021-11-04 | End: 2021-11-04 | Stop reason: HOSPADM

## 2021-11-04 RX ORDER — DEXAMETHASONE SODIUM PHOSPHATE 10 MG/ML
INJECTION, EMULSION INTRAMUSCULAR; INTRAVENOUS PRN
Status: DISCONTINUED | OUTPATIENT
Start: 2021-11-04 | End: 2021-11-04 | Stop reason: SDUPTHER

## 2021-11-04 RX ORDER — FENTANYL CITRATE 50 UG/ML
INJECTION, SOLUTION INTRAMUSCULAR; INTRAVENOUS PRN
Status: DISCONTINUED | OUTPATIENT
Start: 2021-11-04 | End: 2021-11-04 | Stop reason: SDUPTHER

## 2021-11-04 RX ADMIN — ONDANSETRON 4 MG: 2 INJECTION INTRAMUSCULAR; INTRAVENOUS at 10:25

## 2021-11-04 RX ADMIN — FENTANYL CITRATE 50 MCG: 50 INJECTION, SOLUTION INTRAMUSCULAR; INTRAVENOUS at 10:30

## 2021-11-04 RX ADMIN — HYDROCODONE BITARTRATE AND ACETAMINOPHEN 1 TABLET: 5; 325 TABLET ORAL at 11:38

## 2021-11-04 RX ADMIN — ONDANSETRON HYDROCHLORIDE 4 MG: 4 INJECTION, SOLUTION INTRAMUSCULAR; INTRAVENOUS at 09:33

## 2021-11-04 RX ADMIN — LIDOCAINE HYDROCHLORIDE 60 MG: 20 INJECTION, SOLUTION INTRAVENOUS at 08:51

## 2021-11-04 RX ADMIN — DEXAMETHASONE SODIUM PHOSPHATE 5 MG: 10 INJECTION, EMULSION INTRAMUSCULAR; INTRAVENOUS at 08:55

## 2021-11-04 RX ADMIN — FENTANYL CITRATE 50 MCG: 50 INJECTION, SOLUTION INTRAMUSCULAR; INTRAVENOUS at 10:25

## 2021-11-04 RX ADMIN — FENTANYL CITRATE 50 MCG: 50 INJECTION, SOLUTION INTRAMUSCULAR; INTRAVENOUS at 10:01

## 2021-11-04 RX ADMIN — CEFAZOLIN SODIUM 2000 MG: 10 INJECTION, POWDER, FOR SOLUTION INTRAVENOUS at 08:56

## 2021-11-04 RX ADMIN — SODIUM CHLORIDE: 9 INJECTION, SOLUTION INTRAVENOUS at 08:48

## 2021-11-04 RX ADMIN — FENTANYL CITRATE 50 MCG: 50 INJECTION, SOLUTION INTRAMUSCULAR; INTRAVENOUS at 08:48

## 2021-11-04 RX ADMIN — PROPOFOL 140 MG: 10 INJECTION, EMULSION INTRAVENOUS at 08:51

## 2021-11-04 RX ADMIN — Medication 120 MG: at 08:51

## 2021-11-04 RX ADMIN — SODIUM CHLORIDE: 9 INJECTION, SOLUTION INTRAVENOUS at 07:49

## 2021-11-04 RX ADMIN — MIDAZOLAM 2 MG: 1 INJECTION INTRAMUSCULAR; INTRAVENOUS at 08:45

## 2021-11-04 ASSESSMENT — PULMONARY FUNCTION TESTS
PIF_VALUE: 19
PIF_VALUE: 20
PIF_VALUE: 17
PIF_VALUE: 20
PIF_VALUE: 19
PIF_VALUE: 19
PIF_VALUE: 17
PIF_VALUE: 3
PIF_VALUE: 1
PIF_VALUE: 3
PIF_VALUE: 20
PIF_VALUE: 1
PIF_VALUE: 15
PIF_VALUE: 3
PIF_VALUE: 17
PIF_VALUE: 19
PIF_VALUE: 20
PIF_VALUE: 19
PIF_VALUE: 20
PIF_VALUE: 15
PIF_VALUE: 17
PIF_VALUE: 19
PIF_VALUE: 20
PIF_VALUE: 19
PIF_VALUE: 18
PIF_VALUE: 17
PIF_VALUE: 19
PIF_VALUE: 17
PIF_VALUE: 15
PIF_VALUE: 17
PIF_VALUE: 17
PIF_VALUE: 20
PIF_VALUE: 17
PIF_VALUE: 19
PIF_VALUE: 19
PIF_VALUE: 17
PIF_VALUE: 17
PIF_VALUE: 20
PIF_VALUE: 17
PIF_VALUE: 17
PIF_VALUE: 19
PIF_VALUE: 1
PIF_VALUE: 19
PIF_VALUE: 15
PIF_VALUE: 16
PIF_VALUE: 17
PIF_VALUE: 17
PIF_VALUE: 22
PIF_VALUE: 19
PIF_VALUE: 20
PIF_VALUE: 14
PIF_VALUE: 2
PIF_VALUE: 19
PIF_VALUE: 20
PIF_VALUE: 15
PIF_VALUE: 18
PIF_VALUE: 3
PIF_VALUE: 15
PIF_VALUE: 20
PIF_VALUE: 1
PIF_VALUE: 0
PIF_VALUE: 18
PIF_VALUE: 17
PIF_VALUE: 20
PIF_VALUE: 15
PIF_VALUE: 21
PIF_VALUE: 19

## 2021-11-04 ASSESSMENT — PAIN SCALES - WONG BAKER: WONGBAKER_NUMERICALRESPONSE: 0

## 2021-11-04 ASSESSMENT — PAIN SCALES - GENERAL
PAINLEVEL_OUTOF10: 7
PAINLEVEL_OUTOF10: 2
PAINLEVEL_OUTOF10: 7
PAINLEVEL_OUTOF10: 7
PAINLEVEL_OUTOF10: 6

## 2021-11-04 ASSESSMENT — PAIN - FUNCTIONAL ASSESSMENT: PAIN_FUNCTIONAL_ASSESSMENT: 0-10

## 2021-11-04 NOTE — PROGRESS NOTES
Called Dr Lauren Sands pt and spouse has question for Dr Lauren Sands if he would like to come back.

## 2021-11-04 NOTE — ANESTHESIA PRE PROCEDURE
Department of Anesthesiology  Preprocedure Note       Name:  Paxton Quintana   Age:  49 y.o.  :  1965                                          MRN:  [id number]         Date:  2021      Surgeon: Medina Phipps):  Nafisa May MD    Procedure: CYSTOSCOPY HYDRODISTENTION WITH DMSO, BLADDER BOTOX 100 UNITS (N/A )  INTERSTIM REMOVAL (N/A )    Medications prior to admission:   Prior to Admission medications    Medication Sig Start Date End Date Taking? Authorizing Provider   lamoTRIgine (LAMICTAL) 200 MG tablet Take 1 tablet by mouth daily 11/3/21   DEYANIRA Govea CNP   buPROPion (WELLBUTRIN XL) 150 MG extended release tablet Take 3 tablets by mouth every morning 11/3/21   DEYANIRA Govea CNP   hydrocortisone 2.5 % cream Apply topically 2 times daily as needed    Historical Provider, MD   Probiotic Product (PROBIOTIC PO) Take by mouth daily    Historical Provider, MD   diazePAM (VALIUM) 5 MG tablet Take 1 tablet by mouth every 8 hours as needed for Anxiety or Sleep (spasms). 10/12/21 10/12/22  DEYANIRA Valdez CNP   montelukast (SINGULAIR) 10 MG tablet Take 1 tablet by mouth daily 10/12/21   DEYANIRA Valdez CNP   levothyroxine (SYNTHROID) 88 MCG tablet Take 1 tablet by mouth daily 21   DEYANIRA Valdez CNP   cetirizine (ZYRTEC) 10 MG tablet Take 1 tablet by mouth daily 21   DEYANIRA Valdez CNP   albuterol sulfate HFA (PROAIR HFA) 108 (90 Base) MCG/ACT inhaler Inhale 2 puffs into the lungs every 6 hours as needed for Wheezing 12/3/20   DEYANIRA Valdez CNP   cyclobenzaprine (FLEXERIL) 10 MG tablet Take 1 tablet by mouth nightly as needed for Muscle spasms 12/3/20   DEYANIRA Valdez CNP   lisinopril (PRINIVIL;ZESTRIL) 20 MG tablet Take 1 tablet by mouth daily 12/3/20   DEYANIRA Valdez CNP       Current medications:    No current facility-administered medications for this visit. No current outpatient medications on file.      Facility-Administered Medications Ordered in Other Visits   Medication Dose Route Frequency Provider Last Rate Last Admin    0.9 % sodium chloride infusion   IntraVENous Continuous Bekah White  mL/hr at 11/04/21 0749 New Bag at 11/04/21 0749    ceFAZolin (ANCEF) 2000 mg in dextrose 4 % 100 mL IVPB (premix)  2,000 mg IntraVENous On Call to [address] [place] [address] Place, MD        ceFAZolin (ANCEF) 2-4 GM/100ML-% IVPB (premix) SOLN             dimethyl sulfoxide 50 % solution   Urethral Once Bekah White MD        onabotulinumtoxin A (BOTOX) injection 100 Units  100 Units IntraMUSCular Once Bekah White MD           Allergies:     Allergies   Allergen Reactions    Nurtec [Rimegepant Sulfate] Other (See Comments)     Makes her jittery    Abilify [Aripiprazole] Other (See Comments)     Vivid dreams    Cephalexin Nausea And Vomiting    Codeine Nausea And Vomiting    Excedrin Migraine [Aspirin-Acetaminophen-Caffeine] Nausea And Vomiting    Neurontin [Gabapentin] Anxiety    Nucynta [Tapentadol Hcl Er] Other (See Comments)     Terrible headache    Percocet [Oxycodone-Acetaminophen] Nausea And Vomiting    Tramadol Nausea And Vomiting    Tylenol With Codeine #3 [Acetaminophen-Codeine]      Nausea     Vicodin [Hydrocodone-Acetaminophen] Nausea And Vomiting       Problem List:    Patient Active Problem List   Diagnosis Code    Hypothyroidism E03.9    Asthma J45.909    Hypertension I10    Degenerative disc disease [id number]    Depressive disorder due to another medical condition with depressive features F06.31       Past Medical History:        Diagnosis Date    Anxiety     Asthma     [facility]     Bipolar 1 disorder ([facility]     Blood in urine     Bronchitis     Cataracts, bilateral     Degenerative cervical disc     Degenerative disc disease     Depression     Hypertension     Hypothyroidism     Interstitial cystitis     Iritis 2013    OU      Migraines     Osteopenia     PONV (postoperative nausea and vomiting)     Shortness of breath     Thyroid disease        Past Surgical History:        Procedure Laterality Date    BACK SURGERY      multiple injections and nerve blocks at cervical and lumbar    CERVICAL FUSION      c 4,5,6    CERVICAL FUSION  2015    CYSTOSCOPY N/A 2019    CYSTOSCOPY WITH HYDRODISTENTION WITH INSTILLATION OF DMSO AND MARCAINE performed by Cami Johnson MD at [address] [place] (W OR W/O BIOPSY)      HC PAIN BLOCK Bilateral 2019    Third occipital nerve block bilateral. Left trigger point trapezius performed by Sunny Israel MD at [facility]      [address] [place] [address] 951 N/A 2019    STAGE 1 / INSERTION OF INTERSTIM DEVICE performed by Cami Johnson MD at [facility] N/A 2019    STAGE II STIMULATOR INSERTION performed by Cami Johnson MD at [facility] History:    Social History     Tobacco Use    Smoking status: Former Smoker     Types: Cigarettes     Quit date:      Years since quittin.8    Smokeless tobacco: Never Used    Tobacco comment: 1 pack per week intermit for 20 yrs   Substance Use Topics    Alcohol use: No     Alcohol/week: 0.0 standard drinks                                Counseling given: Not Answered  Comment: 1 pack per week intermit for 20 yrs      Vital Signs (Current): There were no vitals filed for this visit.                                            BP Readings from Last 3 Encounters:   21 (!) 117/58   10/12/21 138/80   08/10/21 122/78       NPO Status:                                                                                 BMI:   Wt Readings from Last 3 Encounters:   21 133 lb (60.3 kg)   10/12/21 134 lb (60.8 kg)   21 133 lb 11.2 oz (60.6 kg)     There is no height or weight on file to calculate BMI.    CBC:   Lab Results Component Value Date    WBC 5.4 09/09/2021    RBC 4.47 09/09/2021    RBC 4.32 04/16/2012    HGB 13.4 09/09/2021    HCT 41.7 09/09/2021    MCV 93.3 09/09/2021    RDW 13.2 01/08/2018     09/09/2021       CMP:   Lab Results   Component Value Date     09/09/2021    K 4.6 09/09/2021     09/09/2021    CO2 25 09/09/2021    BUN 16 09/09/2021    CREATININE 0.6 09/09/2021    LABGLOM >90 09/09/2021    GLUCOSE 95 09/09/2021    GLUCOSE 87 04/16/2012    PROT 6.9 08/10/2020    CALCIUM 9.7 09/09/2021    BILITOT 0.3 08/10/2020    ALKPHOS 70 08/10/2020    AST 21 08/10/2020    ALT 22 08/10/2020       POC Tests: No results for input(s): POCGLU, POCNA, POCK, POCCL, POCBUN, POCHEMO, POCHCT in the last 72 hours. Coags:   Lab Results   Component Value Date    PROTIME 0.93 07/08/2011    APTT 26.2 07/08/2011       HCG (If Applicable):   Lab Results   Component Value Date    PREGSERUM NEGATIVE 12/18/2012        ABGs: No results found for: PHART, PO2ART, VIW4HQR, RSW1AYI, BEART, I3EEFRUP     Type & Screen (If Applicable):  Lab Results   Component Value Date    [address] POS 07/14/2011       Anesthesia Evaluation     history of anesthetic complications: PONV. Airway: Mallampati: II     Neck ROM: limited  Mouth opening: > = 3 FB Dental:          Pulmonary:normal exam    (+) asthma: exercise-induced asthma,                            Cardiovascular:  Exercise tolerance: good (>4 METS),   (+) hypertension (Took lisinopril this morning): mild,     (-) past MI and CAD      Rhythm: regular  Rate: normal                    Neuro/Psych:   (+) headaches:,    (-) seizures and CVA           GI/Hepatic/Renal:        (-) GERD, liver disease and no renal disease       Endo/Other:    (+) hypothyroidism::., .    (-) diabetes mellitus               Abdominal:             Vascular:     - DVT and PE. Other Findings:               Anesthesia Plan      general     ASA 2     (GETA. PIV.  Additional access can be obtained after induction if needed. Standard ASA monitors. IV/PO opioids and other adjuncts as needed for pain control. PACU post op for recovery. Possible anesthetics complications were discussed with the patient, including but not limited to: PONV, damage to the airway and surrounding structures (teeth, lips, gums, tongue, etc.), adverse reactions to medicine, cardiac complications (MI, CHF, arrhythmias, etc.), respiratory complications (post-op ventilation, respiratory failure, etc.), neurologic complications (nerve damage, stroke, seizure), and death. The patient was given the opportunity to ask questions and all questions were answered to the patient's satisfaction. The patient is in agreement with the anesthetic plan.  )  Induction: intravenous. Anesthetic plan and risks discussed with patient. Plan discussed with CRNA.                   Anne Bird DO   11/4/2021

## 2021-11-04 NOTE — H&P
Keara Gallegos MD  History and Physical    Patient:  Tony Amaya  MRN: [id number]  YOB: 1965    HISTORY OF PRESENT ILLNESS:     The patient is a 49 y.o. female who presents with oab and painful interstim. Here for procedure. Patient's old records, notes and chart reviewed and summarized above. Keara Gallegos MD independently reviewed the images and verified the radiology reports from:    No results found.       Past Medical History:    Past Medical History:   Diagnosis Date    Anxiety     Asthma     Autoimmune disease ([facility]     Bipolar 1 disorder ([facility]     Blood in urine     Bronchitis     Cataracts, bilateral     Degenerative cervical disc     Degenerative disc disease     Depression     Hypertension     Hypothyroidism     Interstitial cystitis     Iritis 2013    OU      Migraines     Osteopenia     PONV (postoperative nausea and vomiting)     Shortness of breath     Thyroid disease        Past Surgical History:    Past Surgical History:   Procedure Laterality Date    BACK SURGERY      multiple injections and nerve blocks at cervical and lumbar    CERVICAL FUSION  11/09    c 4,5,6    CERVICAL FUSION  11/20/2015    CYSTOSCOPY N/A 4/25/2019    CYSTOSCOPY WITH HYDRODISTENTION WITH INSTILLATION OF DMSO AND MARCAINE performed by Lakisha Hawkins MD at [address] [place] [address] (W OR W/O BIOPSY)  2011    HC PAIN BLOCK Bilateral 11/6/2019    Third occipital nerve block bilateral. Left trigger point trapezius performed by Elisabeth Hurtado MD at [facility]  4/02    [address] [place] [address] N/A 11/20/2019    STAGE 1 / INSERTION OF INTERSTIM DEVICE performed by Lakisha Hawkins MD at [facility] N/A 12/4/2019    STAGE II STIMULATOR INSERTION performed by Lakisha Hawkins MD at [facility]       Medications Prior to Admission:    Prior to Admission medications    Medication Sig Start Date End Date Taking? Authorizing Provider   lamoTRIgine (LAMICTAL) 200 MG tablet Take 1 tablet by mouth daily 11/3/21  Yes DEYANIRA Hernandez CNP   buPROPion (WELLBUTRIN XL) 150 MG extended release tablet Take 3 tablets by mouth every morning 11/3/21  Yes DEYANIRA Hernandez CNP   hydrocortisone 2.5 % cream Apply topically 2 times daily as needed   Yes Historical Provider, MD   Probiotic Product (PROBIOTIC PO) Take by mouth daily   Yes Historical Provider, MD   diazePAM (VALIUM) 5 MG tablet Take 1 tablet by mouth every 8 hours as needed for Anxiety or Sleep (spasms).  10/12/21 10/12/22 Yes DEYANIRA Gonzalez CNP   montelukast (SINGULAIR) 10 MG tablet Take 1 tablet by mouth daily 10/12/21  Yes DEYANIRA Gonzalez CNP   levothyroxine (SYNTHROID) 88 MCG tablet Take 1 tablet by mouth daily 5/11/21  Yes DEYANIRA Gonzalez CNP   cetirizine (ZYRTEC) 10 MG tablet Take 1 tablet by mouth daily 5/11/21  Yes DEYANIRA Gonzalez CNP   albuterol sulfate HFA (PROAIR HFA) 108 (90 Base) MCG/ACT inhaler Inhale 2 puffs into the lungs every 6 hours as needed for Wheezing 12/3/20  Yes DEYANIRA Gonzalez CNP   cyclobenzaprine (FLEXERIL) 10 MG tablet Take 1 tablet by mouth nightly as needed for Muscle spasms 12/3/20  Yes DEYANIRA Gonzalez CNP   lisinopril (PRINIVIL;ZESTRIL) 20 MG tablet Take 1 tablet by mouth daily 12/3/20  Yes DEYANIRA Gonzalez CNP       Allergies:  Nurtec [rimegepant sulfate], Abilify [aripiprazole], Cephalexin, Codeine, Excedrin migraine [aspirin-acetaminophen-caffeine], Neurontin [gabapentin], Nucynta [tapentadol hcl er], Percocet [oxycodone-acetaminophen], Tramadol, Tylenol with codeine #3 [acetaminophen-codeine], and Vicodin [hydrocodone-acetaminophen]    Social History:    Social History     Socioeconomic History    Marital status:      Spouse name: Not on file    Number of children: Not on file    Years of education: Not on file   Karthikeyan Hough Highest education level: Not on file   Occupational History    Not on file   Tobacco Use    Smoking status: Former Smoker     Types: Cigarettes     Quit date:      Years since quittin.8    Smokeless tobacco: Never Used    Tobacco comment: 1 pack per week intermit for 20 yrs   Vaping Use    Vaping Use: Never used   Substance and Sexual Activity    Alcohol use: No     Alcohol/week: 0.0 standard drinks    Drug use: No    Sexual activity: Not on file   Other Topics Concern    Not on file   Social History Narrative    Not on file     Social Determinants of Health     Financial Resource Strain: Low Risk     Difficulty of Paying Living Expenses: Not hard at all   Food Insecurity: No Food Insecurity    Worried About Running Out of Food in the Last Year: Never true    Elisa of Food in the Last Year: Never true   Transportation Needs:     Lack of Transportation (Medical):      Lack of Transportation (Non-Medical):    Physical Activity:     Days of Exercise per Week:     Minutes of Exercise per Session:    Stress:     Feeling of Stress :    Social Connections:     Frequency of Communication with Friends and Family:     Frequency of Social Gatherings with Friends and Family:     Attends Zoroastrian Services:     Active Member of Clubs or Organizations:     Attends Club or Organization Meetings:     Marital Status:    Intimate Partner Violence:     Fear of Current or Ex-Partner:     Emotionally Abused:     Physically Abused:     Sexually Abused:        Family History:    Family History   Problem Relation Age of Onset    Rheum Arthritis Mother     Thyroid Disease Mother     Colon Polyps Mother         esophageal polyps    Other Mother     High Blood Pressure Father     Diabetes Father     High Blood Pressure Sister     Thyroid Disease Brother     High Blood Pressure Brother     Liver Cancer Maternal Uncle     Cancer Maternal Grandfather         pancreatic cancer    Colon Cancer Neg Hx        REVIEW OF SYSTEMS:  Constitutional: negative  Eyes: negative  Respiratory: negative  Cardiovascular: negative  Gastrointestinal: negative  Genitourinary: no acute issues  Musculoskeletal: negative  Skin: negative   Neurological: negative  Hematological/Lymphatic: negative  Psychological: negative    Physical Exam:      Patient Vitals for the past 24 hrs:   BP Temp Temp src Pulse Resp SpO2 Height Weight   11/04/21 0727 (!) 117/58 97.8 °F (36.6 °C) Temporal 76 16 98 % 5' (1.524 m) 133 lb (60.3 kg)     Constitutional: Patient in no acute distress; Neuro: alert and oriented to person place and time. Psych: Mood and affect normal.  Skin: Normal  Lungs: Respiratory effort normal, CTA  Cardiovascular:  Normal peripheral pulses; no murmur. Normal rhythm  Abdomen: Soft, non-tender, non-distended with no CVA, flank pain, hepatosplenomegaly or hernia. Kidneys normal.  Bladder non-tender and not distended. LABS:   No results for input(s): WBC, HGB, HCT, MCV, PLT in the last 72 hours. No results for input(s): NA, K, CL, CO2, PHOS, BUN, CREATININE in the last 72 hours. Invalid input(s): CA  No results found for: PSA      Urinalysis: No results for input(s): COLORU, PHUR, LABCAST, WBCUA, RBCUA, MUCUS, TRICHOMONAS, YEAST, BACTERIA, CLARITYU, SPECGRAV, LEUKOCYTESUR, UROBILINOGEN, Dewanda  in the last 72 hours.     Invalid input(s): NITRATE, GLUCOSEUKETONESUAMORPHOUS     -----------------------------------------------------------------      Assessment and Plan     Impression:    Patient Active Problem List   Diagnosis    Hypothyroidism    Asthma    Hypertension    Degenerative disc disease    Depressive disorder due to another medical condition with depressive features       Plan:     Consent obtained; removal of interstim, dmso hydrodistension botox 100 u in OR today    Veronique Champion MD  8:56 AM 11/4/2021

## 2021-11-04 NOTE — ANESTHESIA POSTPROCEDURE EVALUATION
Department of Anesthesiology  Postprocedure Note    Patient: Joe Hinson  MRN: [id number]  YOB: 1965  Date of evaluation: 11/4/2021  Time:  2:01 PM     Procedure Summary     Date: 11/04/21 Room / Location: [address] [place] [address]    Anesthesia Start: 0848 Anesthesia Stop: 7075    Procedures:       CYSTOSCOPY HYDRODISTENTION WITH DMSO, BLADDER BOTOX 100 UNITS (N/A Bladder)      INTERSTIM REMOVAL (N/A Back) Diagnosis: (MIXED STRESS AND URGE URINARY INCONTINENCE, INTERSTITIAL CYSTITIS)    Surgeons: Rosalie Hawkins MD Responsible Provider: Casey Rodriguez DO    Anesthesia Type: general ASA Status: 2          Anesthesia Type: general    Ulisses Phase I: Ulisses Score: 9    Ulisses Phase II: Ulisses Score: 10    Last vitals: Reviewed and per EMR flowsheets.        Anesthesia Post Evaluation    Patient location during evaluation: PACU  Patient participation: complete - patient participated  Level of consciousness: awake and alert  Airway patency: patent  Nausea & Vomiting: no vomiting and no nausea  Complications: no  Cardiovascular status: hemodynamically stable  Respiratory status: acceptable  Hydration status: stable none

## 2021-11-04 NOTE — PROGRESS NOTES
1001 pt arrived to PACU, unresponsive on arrival. VSS. OPA in place  1012 pt awoke spontaneously, OPA removed. 1025 c/o nausea and pain 7/10, medicated with 50 mcg fentanyl and 4 mg Zofran  1030 no change in pain status, medicated with 50 mcg fentanyl  1035 pt resting, easily awakens and states pain 4/10 and tolerable. VSS  1045 pt resting, resp easy  1050 pt states pain tolerable.  Meets criteria for discharge from PACU, transported to [facility]

## 2021-11-05 NOTE — BRIEF OP NOTE
Brief Postoperative Note      Patient: Keara Langston  YOB: 1965  MRN: [id number]    Date of Procedure: 11/4/2021    Pre-Op Diagnosis: MIXED STRESS AND URGE URINARY INCONTINENCE, INTERSTITIAL CYSTITIS    Post-Op Diagnosis: Same       Procedure(s):  CYSTOSCOPY HYDRODISTENTION WITH DMSO, BLADDER BOTOX 100 UNITS  INTERSTIM REMOVAL    Surgeon(s):  Nile Armando MD    Assistant:  * No surgical staff found *    Anesthesia: General    Estimated Blood Loss (mL): Minimal    Complications: None    Specimens:   ID Type Source Tests Collected by Time Destination   A : stimulator battery  Tissue Back SURGICAL PATHOLOGY Nile Armando MD 11/4/2021 1369        Implants:  * No implants in log *      Drains: * No LDAs found *    Findings:     Device removed entirely      Follow up with np in 2-4 weeks with pvr    Electronically signed by Javier Art MD on 11/5/2021 at 8:15 AM

## 2021-11-23 NOTE — OP NOTE
Patient:  Laquita Murphy  MRN: [id number]  YOB: 1965    FACILITY: [facility]    DATE:11/4/2021    SURGEON: Sherman Covington MD     ASSISTANT: none    PREOPERATIVE DIAGNOSIS: MIXED STRESS AND URGE URINARY INCONTINENCE, INTERSTITIAL CYSTITIS    POSTOPERATIVE DIAGNOSIS: as above    PROCEDURE PERFORMED:   1. Cystoscopy  2. Hydrodistension  3.  instillation of DMSO cocktail  4. Instillation of bladder botox 100 units  5. Removal of interstim device    ANESTHESIA: General    ESTIMATED BLOOD LOSS: 5 (units unknown)     COMPLICATIONS: None immediate    DRAINS: 16 [place] leroy    SPECIMENS:   ID Type Source Tests Collected by Time Destination   A : stimulator battery  Tissue Back SURGICAL PATHOLOGY Zev Yañez MD 11/4/2021 5924        INDICATIONS FOR PROCEDURE:  The patient is a 49 y.o. female who presents today with MIXED STRESS AND URGE URINARY INCONTINENCE, INTERSTITIAL CYSTITIS here for CYSTOSCOPY HYDRODISTENTION WITH DMSO, BLADDER BOTOX 100 UNITS, INTERSTIM REMOVAL. After risks, benefits and alternatives of the procedure were discussed with the patient, the patient elected to proceed. Details of procedure: The patient was brought back to the operating room. The patient was placed in the prone position. Her InterStim device was palpated. A 15 blade scalpel was used to incise over the previous incision. We bluntly dissected and delivered the InterStim battery out of the subcutaneous pocket. We detached this device. We copiously irrigated the wound with gentamicin irrigation. We then closed the incision with 3-0 Vicryl as well as 4-0 Monocryl for the skin. We made a counter incision above the midline where the S3 foramen was located previously. This was used to hold the leads out. The leads were removed in their entirety. After removing the inner stem and closing the incisions we then repositioned the patient for hydrodistention and botox.  She was laid in the supine position and induced under MAC anesthesia. Her genitals were prepped and draped in usual sterile surgical fashion after being placed in dorsal lithotomy position. A timeout was taken per protocol with everyone in agreement. Rigid cystoscope was assembled using a 70 degree lens and passed per the urethra. The urethra was normal without any lesions. The bladder was entered with ease and inspected thoroughly and systematically which did not show any lesions or tumors, stone, fistulous tracts, diverticula. Both ureteral orifices were normal with clear efflux of urine. bladder botox was injected in 20 different sites at 0.5 mg each site. After injecting the botox, We drained the bladder and began to fill with the bag of saline hanging between 60-80 cm above the patient. We visualized the dome of the bladder during the filling process. The bladder was filled to capacity, and this volume was held for 10 minutes. There was no injury to the bladder noted. At this time the bladder was drained and measured: 800 ml. The patient tolerated the procedure well. The scope was removed intact and without any issues. 2 % lidocaine jelly was placed per the urethra for pain control. We then placed a leroy and instilled our DMSO  This concluded the case. She was taken to recovery period and discharged home in stable condition.       Plan:  She will follow up with me as scheduled

## 2021-12-01 ENCOUNTER — TELEPHONE (OUTPATIENT)
Dept: FAMILY MEDICINE CLINIC | Age: 56
End: 2021-12-01

## 2021-12-01 DIAGNOSIS — Z11.52 ENCOUNTER FOR SCREENING FOR COVID-19: Primary | ICD-10-CM

## 2021-12-01 NOTE — TELEPHONE ENCOUNTER
Needs PCR test for Covid, she is going to [facility] on 1-3-21 and needs this 5 days prior. This is mailed to her.

## 2021-12-20 DIAGNOSIS — F33.41 RECURRENT MAJOR DEPRESSIVE DISORDER, IN PARTIAL REMISSION (HCC): ICD-10-CM

## 2021-12-20 DIAGNOSIS — M96.1 FAILED NECK SYNDROME: ICD-10-CM

## 2021-12-20 RX ORDER — DIAZEPAM 5 MG/1
5 TABLET ORAL EVERY 8 HOURS PRN
Qty: 90 TABLET | Refills: 0 | Status: SHIPPED | OUTPATIENT
Start: 2021-12-20 | End: 2022-03-28 | Stop reason: SDUPTHER

## 2021-12-29 ENCOUNTER — HOSPITAL ENCOUNTER (OUTPATIENT)
Dept: WOMENS IMAGING | Age: 56
Discharge: HOME | End: 2021-12-29
Payer: MEDICARE

## 2021-12-29 DIAGNOSIS — Z12.31 ENCOUNTER FOR SCREENING MAMMOGRAM FOR MALIGNANT NEOPLASM OF BREAST: ICD-10-CM

## 2021-12-29 PROCEDURE — 77063 BREAST TOMOSYNTHESIS BI: CPT

## 2021-12-31 ENCOUNTER — HOSPITAL ENCOUNTER (OUTPATIENT)
Age: 56
Discharge: HOME | End: 2021-12-31
Payer: MEDICARE

## 2021-12-31 LAB
INFLUENZA A: NOT DETECTED
INFLUENZA B: NOT DETECTED
SARS-COV-2 RNA, RT PCR: NOT DETECTED

## 2021-12-31 PROCEDURE — 87636 SARSCOV2 & INF A&B AMP PRB: CPT

## 2022-01-12 ENCOUNTER — HOSPITAL ENCOUNTER (EMERGENCY)
Age: 57
Discharge: HOME | End: 2022-01-12
Payer: MEDICARE

## 2022-01-12 VITALS
HEART RATE: 100 BPM | HEIGHT: 60 IN | OXYGEN SATURATION: 98 % | DIASTOLIC BLOOD PRESSURE: 65 MMHG | SYSTOLIC BLOOD PRESSURE: 130 MMHG | WEIGHT: 130 LBS | BODY MASS INDEX: 25.52 KG/M2 | TEMPERATURE: 98.6 F | RESPIRATION RATE: 14 BRPM

## 2022-01-12 DIAGNOSIS — U07.1 COVID-19: Primary | ICD-10-CM

## 2022-01-12 DIAGNOSIS — Z91.89 AT INCREASED RISK OF EXPOSURE TO COVID-19 VIRUS: ICD-10-CM

## 2022-01-12 LAB — SARS-COV-2, NAA: DETECTED

## 2022-01-12 PROCEDURE — 99213 OFFICE O/P EST LOW 20 MIN: CPT

## 2022-01-12 PROCEDURE — 99213 OFFICE O/P EST LOW 20 MIN: CPT | Performed by: NURSE PRACTITIONER

## 2022-01-12 PROCEDURE — 87635 SARS-COV-2 COVID-19 AMP PRB: CPT

## 2022-01-12 ASSESSMENT — ENCOUNTER SYMPTOMS
SHORTNESS OF BREATH: 0
SINUS PRESSURE: 1
COUGH: 1
SINUS PAIN: 0
VOMITING: 0
NAUSEA: 0

## 2022-01-12 ASSESSMENT — PAIN DESCRIPTION - DESCRIPTORS: DESCRIPTORS: ACHING

## 2022-01-12 ASSESSMENT — PAIN SCALES - GENERAL: PAINLEVEL_OUTOF10: 10

## 2022-01-12 ASSESSMENT — PAIN DESCRIPTION - LOCATION: LOCATION: HEAD;GENERALIZED

## 2022-01-12 ASSESSMENT — PAIN DESCRIPTION - FREQUENCY: FREQUENCY: CONTINUOUS

## 2022-01-12 ASSESSMENT — PAIN DESCRIPTION - PAIN TYPE: TYPE: ACUTE PAIN

## 2022-01-12 NOTE — ED PROVIDER NOTES
[facility]  Urgent Care Encounter       CHIEF COMPLAINT       Chief Complaint   Patient presents with    Concern For COVID-19     sinus oressure eye pain fever headache        Nurses Notes reviewed and I agree except as noted in the HPI. HISTORY OF PRESENT ILLNESS   Belkis Melendez is a 49 y.o. female who presents with concerns for COVID-19. She states she has symptoms of sinus pressure, headache, fever of 102.5, decreased appetite, and intermittent cough. States her symptoms started on Monday evening. She was exposed after traveling to the [facility]. She also states her granddaughter tested positive. She has tried Michaela-Jewell Tylenol which is helped her fevers. She denies any known worsening factors. The history is provided by the patient. REVIEW OF SYSTEMS     Review of Systems   Constitutional: Positive for fatigue and fever. Negative for chills. HENT: Positive for sinus pressure. Negative for congestion and sinus pain. Respiratory: Positive for cough. Negative for shortness of breath. Cardiovascular: Negative for chest pain. Gastrointestinal: Negative for nausea and vomiting. Neurological: Positive for headaches. PAST MEDICAL HISTORY         Diagnosis Date    Anxiety     Asthma     Autoimmune disease ([address]     Bipolar 1 disorder ([address]     Blood in urine     Bronchitis     Cataracts, bilateral     Degenerative cervical disc     Degenerative disc disease     Depression     Hypertension     Hypothyroidism     Interstitial cystitis     Iritis 2013    OU      Migraines     Osteopenia     PONV (postoperative nausea and vomiting)     Shortness of breath     Thyroid disease        SURGICALHISTORY     Patient  has a past surgical history that includes Hysterectomy (4/02); cervical fusion (11/09); Fulguration Minor Bladder Lesion (with o (2011); Incontinence surgery;  Cystoscopy (N/A, 4/25/2019); back surgery; Pain Block (Bilateral, 11/6/2019); spinal cord stimulator implantation (N/A, 11/20/2019); cervical fusion (11/20/2015); spinal cord stimulator implantation (N/A, 12/4/2019); Cystoscopy (N/A, 11/4/2021); Stimulator Surgery (N/A, 11/4/2021); and knee surgery. CURRENT MEDICATIONS       Previous Medications    ALBUTEROL SULFATE HFA (PROAIR HFA) 108 (90 BASE) MCG/ACT INHALER    Inhale 2 puffs into the lungs every 6 hours as needed for Wheezing    BUPROPION (WELLBUTRIN XL) 150 MG EXTENDED RELEASE TABLET    Take 3 tablets by mouth every morning    CETIRIZINE (ZYRTEC) 10 MG TABLET    Take 1 tablet by mouth daily    CYCLOBENZAPRINE (FLEXERIL) 10 MG TABLET    Take 1 tablet by mouth nightly as needed for Muscle spasms    DIAZEPAM (VALIUM) 5 MG TABLET    Take 1 tablet by mouth every 8 hours as needed for Anxiety or Sleep (spasms). HYDROCORTISONE 2.5 % CREAM    Apply topically 2 times daily as needed    LAMOTRIGINE (LAMICTAL) 200 MG TABLET    Take 1 tablet by mouth daily    LEVOTHYROXINE (SYNTHROID) 88 MCG TABLET    Take 1 tablet by mouth daily    LISINOPRIL (PRINIVIL;ZESTRIL) 20 MG TABLET    Take 1 tablet by mouth daily    MONTELUKAST (SINGULAIR) 10 MG TABLET    Take 1 tablet by mouth daily    PROBIOTIC PRODUCT (PROBIOTIC PO)    Take by mouth daily       ALLERGIES     Patient is is allergic to nurtec [rimegepant sulfate], abilify [aripiprazole], cephalexin, codeine, excedrin migraine [aspirin-acetaminophen-caffeine], neurontin [gabapentin], nucynta [tapentadol hcl er], percocet [oxycodone-acetaminophen], tramadol, tylenol with codeine #3 [acetaminophen-codeine], and vicodin [hydrocodone-acetaminophen]. Patients   There is no immunization history on file for this patient. FAMILY HISTORY     Patient's family history includes Cancer in her maternal grandfather; Colon Polyps in her mother; Diabetes in her father; High Blood Pressure in her brother, father, and sister; Nate Dingwall in her maternal uncle;  Other in her mother; Rheum Arthritis in her mother; Thyroid Disease in her brother and mother. SOCIAL HISTORY     Patient  reports that she quit smoking about 15 years ago. Her smoking use included cigarettes. She has never used smokeless tobacco. She reports that she does not drink alcohol and does not use drugs. PHYSICAL EXAM     ED TRIAGE VITALS  BP: 130/65, Temp: 98.6 °F (37 °C), Pulse: 100, Resp: 14, SpO2: 98 %,Estimated body mass index is 25.39 kg/m² as calculated from the following:    Height as of this encounter: 5' (1.524 m). Weight as of this encounter: 130 lb (59 kg). ,No LMP recorded. Patient has had a hysterectomy. Physical Exam  Vitals and nursing note reviewed. Constitutional:       General: She is not in acute distress. Appearance: She is ill-appearing. HENT:      Right Ear: External ear normal.      Left Ear: External ear normal.      Nose: No congestion or rhinorrhea. Mouth/Throat:      Mouth: Mucous membranes are moist.      Pharynx: No posterior oropharyngeal erythema. Cardiovascular:      Rate and Rhythm: Normal rate and regular rhythm. Heart sounds: Normal heart sounds. Pulmonary:      Effort: Pulmonary effort is normal.      Breath sounds: Normal breath sounds. Abdominal:      Tenderness: There is no abdominal tenderness. Lymphadenopathy:      Cervical: No cervical adenopathy. Skin:     General: Skin is warm and dry. Neurological:      Mental Status: She is alert and oriented to person, place, and time.    Psychiatric:         Behavior: Behavior normal.       DIAGNOSTIC RESULTS     Labs:  Results for orders placed or performed during the hospital encounter of 01/12/22   COVID-19, Rapid   Result Value Ref Range    SARS-CoV-2, ATIYA DETECTED (AA) NOT DETECTED       IMAGING:  None    EKG:  None    URGENT CARE COURSE:     Vitals:    01/12/22 1219   BP: 130/65   Pulse: 100   Resp: 14   Temp: 98.6 °F (37 °C)   TempSrc: Temporal   SpO2: 98%   Weight: 130 lb (59 kg)   Height: 5' (1.524 m)

## 2022-01-12 NOTE — Clinical Note
Jessee Mcclellan was seen and treated in our emergency department on 1/12/2022. She may return to work on 01/17/2022. If you have any questions or concerns, please don't hesitate to call.       Jacqueline Godinez, DEYANIRA - CNP

## 2022-01-12 NOTE — ED NOTES
Pt verbalized discharge instructions. Pt informed to go to ER if develop chest pain, shortness of breath or abdominal pain. Pt ambulatory out in stable condition. Assessment unchanged.        Mohini Florse RN  01/12/22 2159

## 2022-01-12 NOTE — ED TRIAGE NOTES
Pt ambulatory into [facility] with c/o exposure to covid. Pt states she just returned from [facility] and they have it bad and others that traveled have gotten it. Pt states she baby sat her granddaughter yesterday and she tested positive today. Pt with c/o body aches, diarrhea, headache, fever eye pain for the past two days. Pt states she tested negative prior to returning from travel on 01/10/22.

## 2022-01-18 ENCOUNTER — TELEPHONE (OUTPATIENT)
Dept: FAMILY MEDICINE CLINIC | Age: 57
End: 2022-01-18

## 2022-01-18 ENCOUNTER — VIRTUAL VISIT (OUTPATIENT)
Dept: UROLOGY | Age: 57
End: 2022-01-18
Payer: MEDICARE

## 2022-01-18 DIAGNOSIS — N32.81 OAB (OVERACTIVE BLADDER): Primary | ICD-10-CM

## 2022-01-18 PROCEDURE — 99442 PR PHYS/QHP TELEPHONE EVALUATION 11-20 MIN: CPT | Performed by: UROLOGY

## 2022-01-18 RX ORDER — BENZONATATE 200 MG/1
200 CAPSULE ORAL 3 TIMES DAILY PRN
Qty: 30 CAPSULE | Refills: 0 | Status: SHIPPED | OUTPATIENT
Start: 2022-01-18 | End: 2022-01-25

## 2022-01-18 NOTE — TELEPHONE ENCOUNTER
----- Message from [address] sent at 1/18/2022  8:56 AM EST -----  Subject: Message to Provider    QUESTIONS  Information for Provider? Dr. Isidoro Tucker? Pt diagnosed w/ COVID on 1/12. Urgent care did not provide any medication for symptoms and she is having   a cough that is affecting her sleep as well as body aches. Would like to   know if Dr. Isidoro Tucker can prescribe something for cough. Please call pt back   to verify and advise  ---------------------------------------------------------------------------  --------------  CALL BACK INFO  What is the best way for the office to contact you? OK to leave message on   voicemail  Preferred Call Back Phone Number? [phone]  ---------------------------------------------------------------------------  --------------  SCRIPT ANSWERS  Relationship to Patient?  Self

## 2022-01-19 DIAGNOSIS — Z96.0 HISTORY OF PUBOVAGINAL SLING: ICD-10-CM

## 2022-01-19 DIAGNOSIS — R10.2 PELVIC PAIN: Primary | ICD-10-CM

## 2022-01-19 RX ORDER — AMITRIPTYLINE HYDROCHLORIDE 25 MG/1
25 TABLET, FILM COATED ORAL NIGHTLY
Qty: 30 TABLET | Refills: 11 | Status: SHIPPED | OUTPATIENT
Start: 2022-01-19 | End: 2022-05-31

## 2022-01-20 ENCOUNTER — TELEPHONE (OUTPATIENT)
Dept: FAMILY MEDICINE CLINIC | Age: 57
End: 2022-01-20

## 2022-01-20 NOTE — TELEPHONE ENCOUNTER
Patient called and states that she called in the other day requesting medication for a cough. Tessalon pearls were prescribed at that time. Patient states that they are not effective would like something stronger.     Bryan galvan

## 2022-01-20 NOTE — TELEPHONE ENCOUNTER
She can also take Delsym or Robitussin over-the-counter.   she is also welcome to come in for an appointment

## 2022-01-23 NOTE — PROGRESS NOTES
Marcell Doherty is a 49 y.o. female evaluated via telephone on 1/18/2022. Consent:  She and/or health care decision maker is aware that that she may receive a bill for this telephone service, which includes applicable co-pays, depending on her insurance coverage, and has provided verbal consent to proceed. Documentation:  I communicated with the patient and/or health care decision maker about mixed incontinence. Details of this discussion including any medical advice provided:     Mixed incontinence  Hx of midurethral sling in past  oab- failed botox and interstim. interstim has been explanted  Pelvic pain/interstitial cystitis- not improved with meds/hydrodistension with DMSO. Plan:  Referral to tertiary care center  Will prescribe elavil -- needs approval with psychiatrist prior      I affirm this is a Patient Initiated Episode with a Patient who has not had a related appointment within my department in the past 7 days or scheduled within the next 24 hours. Patient identification was verified at the start of the visit: Yes    Total Time: minutes: 11-20 minutes    Marcell Doherty, was evaluated through a synchronous (real-time) audio-video encounter. The patient (or guardian if applicable) is aware that this is a billable service, which includes applicable co-pays. This Virtual Visit was conducted with patient's (and/or legal guardian's) consent. The visit was conducted pursuant to the emergency declaration under the [facility], [address] [place] [address] waiver authority and the [employer] Act. Patient identification was verified, and a caregiver was present when appropriate. The patient was located at home in a state where the provider was licensed to provide care.        Note: not billable if this call serves to triage the patient into an appointment for the relevant concern      Bola Cobb MD

## 2022-01-27 ENCOUNTER — NURSE TRIAGE (OUTPATIENT)
Dept: OTHER | Facility: CLINIC | Age: 57
End: 2022-01-27

## 2022-01-27 NOTE — TELEPHONE ENCOUNTER
Received call from [facility] at [facility] with [employer] Complaint. Subjective: Caller states Sukumar Thompson is lightheaded and dizzy and passed out (3-4 weeks ago) and now she has the dizziness today\" Has a history of this, since she was a child. Had Covid about 3 weeks ago, and has had fatigue since. Current Symptoms: dizziness, head hurts, she was washing her hair, and she had to get her  to come get her    Onset: 4 weeks ago; sudden    Associated Symptoms: NA    Pain Severity: 0/10; N/A; none    Temperature: denies fever     What has been tried: nothing    LMP: NA Pregnant: NA    Recommended disposition: See PCP within 24 hours, was told walk in/UCC if no appointments. Care advice provided, patient verbalizes understanding; denies any other questions or concerns; instructed to call back for any new or worsening symptoms. Writer provided warm transfer to [facility] at [facility] for appointment scheduling     Attention Provider: Thank you for allowing me to participate in the care of your patient. The patient was connected to triage in response to information provided to the ECC/PSC. Please do not respond through this encounter as the response is not directed to a shared pool.       Reason for Disposition   [1] New headache AND [2] age > 50    Protocols used: HEADACHE-ADULT-AH

## 2022-01-31 ENCOUNTER — OFFICE VISIT (OUTPATIENT)
Dept: FAMILY MEDICINE CLINIC | Age: 57
End: 2022-01-31
Payer: MEDICARE

## 2022-01-31 VITALS
BODY MASS INDEX: 25.78 KG/M2 | WEIGHT: 132 LBS | RESPIRATION RATE: 16 BRPM | OXYGEN SATURATION: 99 % | HEART RATE: 82 BPM | SYSTOLIC BLOOD PRESSURE: 138 MMHG | TEMPERATURE: 98.2 F | DIASTOLIC BLOOD PRESSURE: 88 MMHG

## 2022-01-31 DIAGNOSIS — J45.20 MILD INTERMITTENT REACTIVE AIRWAY DISEASE WITHOUT COMPLICATION: ICD-10-CM

## 2022-01-31 DIAGNOSIS — R55 SYNCOPE AND COLLAPSE: Primary | ICD-10-CM

## 2022-01-31 DIAGNOSIS — Z13.220 SCREENING CHOLESTEROL LEVEL: ICD-10-CM

## 2022-01-31 DIAGNOSIS — M96.1 FAILED NECK SYNDROME: ICD-10-CM

## 2022-01-31 DIAGNOSIS — F33.2 SEVERE EPISODE OF RECURRENT MAJOR DEPRESSIVE DISORDER, WITHOUT PSYCHOTIC FEATURES (HCC): ICD-10-CM

## 2022-01-31 DIAGNOSIS — R27.8 COORDINATION ABNORMAL: ICD-10-CM

## 2022-01-31 DIAGNOSIS — I10 ESSENTIAL HYPERTENSION: ICD-10-CM

## 2022-01-31 DIAGNOSIS — G44.86 CERVICOGENIC HEADACHE: ICD-10-CM

## 2022-01-31 DIAGNOSIS — R26.81 UNSTEADY GAIT: ICD-10-CM

## 2022-01-31 DIAGNOSIS — E03.9 HYPOTHYROIDISM, UNSPECIFIED TYPE: ICD-10-CM

## 2022-01-31 PROCEDURE — G8484 FLU IMMUNIZE NO ADMIN: HCPCS | Performed by: NURSE PRACTITIONER

## 2022-01-31 PROCEDURE — 99214 OFFICE O/P EST MOD 30 MIN: CPT | Performed by: NURSE PRACTITIONER

## 2022-01-31 PROCEDURE — 1036F TOBACCO NON-USER: CPT | Performed by: NURSE PRACTITIONER

## 2022-01-31 PROCEDURE — G8427 DOCREV CUR MEDS BY ELIG CLIN: HCPCS | Performed by: NURSE PRACTITIONER

## 2022-01-31 PROCEDURE — 3017F COLORECTAL CA SCREEN DOC REV: CPT | Performed by: NURSE PRACTITIONER

## 2022-01-31 PROCEDURE — G8419 CALC BMI OUT NRM PARAM NOF/U: HCPCS | Performed by: NURSE PRACTITIONER

## 2022-01-31 ASSESSMENT — PATIENT HEALTH QUESTIONNAIRE - PHQ9
SUM OF ALL RESPONSES TO PHQ QUESTIONS 1-9: 0
SUM OF ALL RESPONSES TO PHQ QUESTIONS 1-9: 0
10. IF YOU CHECKED OFF ANY PROBLEMS, HOW DIFFICULT HAVE THESE PROBLEMS MADE IT FOR YOU TO DO YOUR WORK, TAKE CARE OF THINGS AT HOME, OR GET ALONG WITH OTHER PEOPLE: 0
3. TROUBLE FALLING OR STAYING ASLEEP: 0
SUM OF ALL RESPONSES TO PHQ QUESTIONS 1-9: 0
9. THOUGHTS THAT YOU WOULD BE BETTER OFF DEAD, OR OF HURTING YOURSELF: 0
8. MOVING OR SPEAKING SO SLOWLY THAT OTHER PEOPLE COULD HAVE NOTICED. OR THE OPPOSITE, BEING SO FIGETY OR RESTLESS THAT YOU HAVE BEEN MOVING AROUND A LOT MORE THAN USUAL: 0
SUM OF ALL RESPONSES TO PHQ9 QUESTIONS 1 & 2: 0
4. FEELING TIRED OR HAVING LITTLE ENERGY: 0
2. FEELING DOWN, DEPRESSED OR HOPELESS: 0
1. LITTLE INTEREST OR PLEASURE IN DOING THINGS: 0
6. FEELING BAD ABOUT YOURSELF - OR THAT YOU ARE A FAILURE OR HAVE LET YOURSELF OR YOUR FAMILY DOWN: 0
5. POOR APPETITE OR OVEREATING: 0
SUM OF ALL RESPONSES TO PHQ QUESTIONS 1-9: 0
7. TROUBLE CONCENTRATING ON THINGS, SUCH AS READING THE NEWSPAPER OR WATCHING TELEVISION: 0

## 2022-02-01 ENCOUNTER — NURSE ONLY (OUTPATIENT)
Dept: LAB | Age: 57
End: 2022-02-01

## 2022-02-01 ENCOUNTER — TELEPHONE (OUTPATIENT)
Dept: FAMILY MEDICINE CLINIC | Age: 57
End: 2022-02-01

## 2022-02-01 DIAGNOSIS — E03.9 HYPOTHYROIDISM, UNSPECIFIED TYPE: ICD-10-CM

## 2022-02-01 DIAGNOSIS — R55 SYNCOPE AND COLLAPSE: ICD-10-CM

## 2022-02-01 DIAGNOSIS — I10 ESSENTIAL HYPERTENSION: ICD-10-CM

## 2022-02-01 DIAGNOSIS — E03.9 HYPOTHYROIDISM, UNSPECIFIED TYPE: Primary | ICD-10-CM

## 2022-02-01 LAB
ALBUMIN SERPL-MCNC: 4.8 G/DL (ref 3.5–5.1)
ALP BLD-CCNC: 99 U/L (ref 38–126)
ALT SERPL-CCNC: 15 U/L (ref 11–66)
ANION GAP SERPL CALCULATED.3IONS-SCNC: 14 MEQ/L (ref 8–16)
AST SERPL-CCNC: 16 U/L (ref 5–40)
BASOPHILS # BLD: 1 %
BASOPHILS ABSOLUTE: 0.1 THOU/MM3 (ref 0–0.1)
BILIRUB SERPL-MCNC: 0.2 MG/DL (ref 0.3–1.2)
BUN BLDV-MCNC: 15 MG/DL (ref 7–22)
CALCIUM SERPL-MCNC: 9.8 MG/DL (ref 8.5–10.5)
CHLORIDE BLD-SCNC: 102 MEQ/L (ref 98–111)
CO2: 25 MEQ/L (ref 23–33)
CREAT SERPL-MCNC: 0.6 MG/DL (ref 0.4–1.2)
EOSINOPHIL # BLD: 5.1 %
EOSINOPHILS ABSOLUTE: 0.3 THOU/MM3 (ref 0–0.4)
ERYTHROCYTE [DISTWIDTH] IN BLOOD BY AUTOMATED COUNT: 13.7 % (ref 11.5–14.5)
ERYTHROCYTE [DISTWIDTH] IN BLOOD BY AUTOMATED COUNT: 48.7 FL (ref 35–45)
GFR SERPL CREATININE-BSD FRML MDRD: > 90 ML/MIN/1.73M2
GLUCOSE BLD-MCNC: 95 MG/DL (ref 70–108)
HCT VFR BLD CALC: 41.8 % (ref 37–47)
HEMOGLOBIN: 13.2 GM/DL (ref 12–16)
IMMATURE GRANS (ABS): 0.02 THOU/MM3 (ref 0–0.07)
IMMATURE GRANULOCYTES: 0.3 %
LYMPHOCYTES # BLD: 32.4 %
LYMPHOCYTES ABSOLUTE: 2 THOU/MM3 (ref 1–4.8)
MAGNESIUM: 2.4 MG/DL (ref 1.6–2.4)
MCH RBC QN AUTO: 30.5 PG (ref 26–33)
MCHC RBC AUTO-ENTMCNC: 31.6 GM/DL (ref 32.2–35.5)
MCV RBC AUTO: 96.5 FL (ref 81–99)
MONOCYTES # BLD: 9.5 %
MONOCYTES ABSOLUTE: 0.6 THOU/MM3 (ref 0.4–1.3)
NUCLEATED RED BLOOD CELLS: 0 /100 WBC
PLATELET # BLD: 327 THOU/MM3 (ref 130–400)
PMV BLD AUTO: 10.4 FL (ref 9.4–12.4)
POTASSIUM SERPL-SCNC: 4.6 MEQ/L (ref 3.5–5.2)
RBC # BLD: 4.33 MILL/MM3 (ref 4.2–5.4)
SEG NEUTROPHILS: 51.7 %
SEGMENTED NEUTROPHILS ABSOLUTE COUNT: 3.2 THOU/MM3 (ref 1.8–7.7)
SODIUM BLD-SCNC: 141 MEQ/L (ref 135–145)
T4 FREE: 1.64 NG/DL (ref 0.93–1.76)
TOTAL PROTEIN: 6.7 G/DL (ref 6.1–8)
TSH SERPL DL<=0.05 MIU/L-ACNC: 4.17 UIU/ML (ref 0.4–4.2)
WBC # BLD: 6.2 THOU/MM3 (ref 4.8–10.8)

## 2022-02-01 RX ORDER — LEVOTHYROXINE SODIUM 0.1 MG/1
100 TABLET ORAL DAILY
Qty: 30 TABLET | Refills: 2 | Status: SHIPPED | OUTPATIENT
Start: 2022-02-01 | End: 2022-05-02 | Stop reason: SDUPTHER

## 2022-02-01 ASSESSMENT — ENCOUNTER SYMPTOMS
ABDOMINAL DISTENTION: 0
SHORTNESS OF BREATH: 0
BACK PAIN: 1
WHEEZING: 0
COUGH: 0
ABDOMINAL PAIN: 0
CHEST TIGHTNESS: 0

## 2022-02-01 NOTE — TELEPHONE ENCOUNTER
Chief Complaint   Patient presents with    Establish Care    Back Pain     2 months     Visit Vitals  BP 95/60 (BP 1 Location: Right arm, BP Patient Position: Sitting)   Pulse (!) 55   Temp 98.1 °F (36.7 °C) (Oral)   Resp 18   Ht 5' 9\" (1.753 m)   Wt 151 lb 9.6 oz (68.8 kg)   SpO2 97%   BMI 22.39 kg/m²     1. Have you been to the ER, urgent care clinic since your last visit? Hospitalized since your last visit? No    2. Have you seen or consulted any other health care providers outside of the [address] [place] [address] since your last visit? Include any pap smears or colon screening.  List provided [facility] radiology called and said that if you are indeed wanting a CT cervical spine WITH contrast then you also need to order a CT myelogram to go with it.  If you do not want the myelogram, then the order needs to be changed to CT cervical spine WITHOUT contrast.

## 2022-02-01 NOTE — PROGRESS NOTES
[address] [address]  Dept: [phone]  Dept Fax: [phone]  Loc: [phone]  PROGRESS NOTE      VisitDate: 1/31/2022    Marcell Doherty is a 49 y.o. female who presents today for:     Chief Complaint   Patient presents with    Dizziness     had a dizzy spell last friday and headaches. She states these are intermittent. Earlier this month she did have syncope and collapse (has a hx of passing out when she was a child)         Subjective:  Patient presents with complaint of multiple episodes of dizziness headaches. Reports that she has over the past several weeks passed out. Denies chest pain shortness of breath seizure activity urinary incontinence. .  Patient feels that this originates from her chronic neck condition and deterioration. History anxiety, depression, asthma failed back failed neck syndrome, hypothyroid, ICD, hypertension, migraines, thyroid disease. Patient also reports difficulty with balance and unstable gait over the past month. Negative orthostats in office    Review of Systems   Constitutional: Negative for activity change, appetite change, chills, fatigue and fever. Eyes: Negative for visual disturbance. Respiratory: Negative for cough, chest tightness, shortness of breath and wheezing. Cardiovascular: Negative for chest pain, palpitations and leg swelling. Gastrointestinal: Negative for abdominal distention and abdominal pain. Genitourinary: Negative for dysuria. Musculoskeletal: Positive for back pain, gait problem, neck pain and neck stiffness. Negative for arthralgias. Skin: Negative. Negative for rash. Neurological: Positive for dizziness, syncope, light-headedness and headaches. Negative for seizures. Hematological: Negative for adenopathy. Psychiatric/Behavioral: Positive for decreased concentration and dysphoric mood. The patient is nervous/anxious.     All other systems reviewed and are negative.     Past Medical History:   Diagnosis Date    Anxiety     Asthma     Autoimmune disease ([facility]     Bipolar 1 disorder ([address]     Blood in urine     Bronchitis     Cataracts, bilateral     Degenerative cervical disc     Degenerative disc disease     Depression     Hypertension     Hypothyroidism     Interstitial cystitis     Iritis 2013    OU      Migraines     Osteopenia     PONV (postoperative nausea and vomiting)     Shortness of breath     Thyroid disease       Past Surgical History:   Procedure Laterality Date    BACK SURGERY      multiple injections and nerve blocks at cervical and lumbar    CERVICAL FUSION  11/09    c 4,5,6    CERVICAL FUSION  11/20/2015    CYSTOSCOPY N/A 4/25/2019    CYSTOSCOPY WITH HYDRODISTENTION WITH INSTILLATION OF DMSO AND MARCAINE [facility] [address] [facility]    CYSTOSCOPY HYDRODISTENTION WITH DMSO, BLADDER BOTOX 100 UNITS performed by Geroge Gaucher, MD at [address] [place] [address] (W OR W/O BIOPSY)  2011    HC PAIN BLOCK Bilateral 11/6/2019    Third occipital nerve block bilateral. Left trigger point trapezius performed by Delta Morejon MD at [facility]  4/02    [address] N/A 11/20/2019    STAGE 1 / INSERTION OF INTERSTIM DEVICE performed by Ruma Phillips MD at [facility] N/A 12/4/2019    STAGE II STIMULATOR INSERTION performed by Ruma Phillips MD at [address] N/A 11/4/2021    INTERSTIM REMOVAL performed by Geroge Gaucher, MD at [facility] History   Problem Relation Age of Onset    Rheum Arthritis Mother     Thyroid Disease Mother     Colon Polyps Mother         esophageal polyps    Other Mother     High Blood Pressure Father     Diabetes Father     High Blood Pressure Sister    Tiffany Hinds Thyroid Disease Brother     High Blood Pressure Brother     Liver Cancer Maternal Uncle     Cancer Maternal Grandfather         pancreatic cancer    Colon Cancer Neg Hx      Social History     Tobacco Use    Smoking status: Former Smoker     Packs/day: 0.25     Years: 20.00     Pack years: 5.00     Types: Cigarettes     Start date: 28     Quit date: 2007     Years since quitting: 15.0    Smokeless tobacco: Never Used    Tobacco comment: 1 pack per week intermit for 20 yrs   Substance Use Topics    Alcohol use: No     Alcohol/week: 0.0 standard drinks      Current Outpatient Medications   Medication Sig Dispense Refill    amitriptyline (ELAVIL) 25 MG tablet Take 1 tablet by mouth nightly 30 tablet 11    diazePAM (VALIUM) 5 MG tablet Take 1 tablet by mouth every 8 hours as needed for Anxiety or Sleep (spasms). 90 tablet 0    lamoTRIgine (LAMICTAL) 200 MG tablet Take 1 tablet by mouth daily 90 tablet 0    buPROPion (WELLBUTRIN XL) 150 MG extended release tablet Take 3 tablets by mouth every morning 270 tablet 0    Probiotic Product (PROBIOTIC PO) Take by mouth daily      montelukast (SINGULAIR) 10 MG tablet Take 1 tablet by mouth daily 30 tablet 5    levothyroxine (SYNTHROID) 88 MCG tablet Take 1 tablet by mouth daily 90 tablet 2    cetirizine (ZYRTEC) 10 MG tablet Take 1 tablet by mouth daily 30 tablet 5    albuterol sulfate HFA (PROAIR HFA) 108 (90 Base) MCG/ACT inhaler Inhale 2 puffs into the lungs every 6 hours as needed for Wheezing 1 Inhaler 5    cyclobenzaprine (FLEXERIL) 10 MG tablet Take 1 tablet by mouth nightly as needed for Muscle spasms 30 tablet 1    lisinopril (PRINIVIL;ZESTRIL) 20 MG tablet Take 1 tablet by mouth daily 30 tablet 5     No current facility-administered medications for this visit.      Allergies   Allergen Reactions    Nurtec [Rimegepant Sulfate] Other (See Comments)     Makes her jittery    Abilify [Aripiprazole] Other (See Comments)     Vivid dreams    Cephalexin Nausea And Vomiting    Codeine Nausea And Vomiting    Excedrin Migraine [Aspirin-Acetaminophen-Caffeine] Nausea And Vomiting    Neurontin [Gabapentin] Anxiety    Nucynta [Tapentadol Hcl Er] Other (See Comments)     Terrible headache    Percocet [Oxycodone-Acetaminophen] Nausea And Vomiting    Tramadol Nausea And Vomiting    Tylenol With Codeine #3 [Acetaminophen-Codeine]      Nausea     Vicodin [Hydrocodone-Acetaminophen] Nausea And Vomiting     Health Maintenance   Topic Date Due    Hepatitis C screen  Never done    COVID-19 Vaccine (1) Never done    Pneumococcal 0-64 years Vaccine (1 of 2 - PPSV23) Never done    DTaP/Tdap/Td vaccine (1 - Tdap) Never done    Shingles Vaccine (1 of 2) Never done    A1C test (Diabetic or Prediabetic)  12/12/2017    Annual Wellness Visit (AWV)  Never done    Flu vaccine (1) Never done    TSH testing  03/26/2022    Potassium monitoring  09/09/2022    Creatinine monitoring  09/09/2022    Depression Monitoring  01/31/2023    Breast cancer screen  12/29/2023    Lipid screen  08/10/2025    Colon cancer screen colonoscopy  08/06/2030    HIV screen  Completed    Hepatitis A vaccine  Aged Out    Hepatitis B vaccine  Aged Out    Hib vaccine  Aged Out    Meningococcal (ACWY) vaccine  Aged Out         Objective:     Physical Exam  Vitals and nursing note reviewed. Constitutional:       Appearance: Normal appearance. She is well-developed and normal weight. HENT:      Head: Normocephalic. Right Ear: Tympanic membrane and ear canal normal.      Left Ear: Tympanic membrane and ear canal normal.      Nose: Nose normal.      Mouth/Throat:      Pharynx: Oropharynx is clear. Eyes:      Extraocular Movements: Extraocular movements intact. Right eye: Nystagmus present. Normal extraocular motion. Left eye: Nystagmus present. Normal extraocular motion.       Conjunctiva/sclera: Conjunctivae normal.   Cardiovascular:      Rate and Rhythm: Normal rate and regular rhythm. Pulses: Normal pulses. Heart sounds: Normal heart sounds. Pulmonary:      Effort: Pulmonary effort is normal.      Breath sounds: Normal breath sounds. Abdominal:      General: Bowel sounds are normal.      Palpations: Abdomen is soft. Musculoskeletal:      Cervical back: Neck supple. Pain with movement, spinous process tenderness and muscular tenderness present. Decreased range of motion. Skin:     General: Skin is warm and dry. Neurological:      General: No focal deficit present. Mental Status: She is alert and oriented to person, place, and time. Coordination: Romberg sign positive. Coordination abnormal. Finger-Nose-Finger Test abnormal. Rapid alternating movements normal.      Comments: Equal strength upper extremities week 2 out of 4. Equal strength lower extremities weak 2 out of 4   Psychiatric:         Mood and Affect: Mood normal.         Thought Content: Thought content normal.         Judgment: Judgment normal.       /88   Pulse 82   Temp 98.2 °F (36.8 °C) (Oral)   Resp 16   Wt 132 lb (59.9 kg)   SpO2 99%   BMI 25.78 kg/m²       Impression/Plan:  1. Syncope and collapse    2. Hypothyroidism, unspecified type    3. Mild intermittent reactive airway disease without complication    4. Cervicogenic headache    5. Essential hypertension    6. Screening cholesterol level    7. Severe episode of recurrent major depressive disorder, without psychotic features ([facility]    8. Failed neck syndrome    9. Unsteady gait    10.  Coordination abnormal      Requested Prescriptions      No prescriptions requested or ordered in this encounter     Orders Placed This Encounter   Procedures    VL DUP CAROTID BILATERAL     Standing Status:   Future     Standing Expiration Date:   1/31/2023    CT CERVICAL SPINE W CONTRAST     Standing Status:   Future     Standing Expiration Date:   1/31/2023    CBC Auto Differential     Standing Status:   Future     Standing Expiration

## 2022-02-01 NOTE — TELEPHONE ENCOUNTER
Notified of new dosage, med list adjusted, labs mailed to home.  Script sent to [employer].

## 2022-02-01 NOTE — TELEPHONE ENCOUNTER
----- Message from DEYANIRA Mattson CNP sent at 2/1/2022 12:49 PM EST -----  CBC within normal limits pending other labs

## 2022-02-01 NOTE — TELEPHONE ENCOUNTER
----- Message from DEYANIRA Harrell CNP sent at 2/1/2022  2:48 PM EST -----  TSH elevated 4.1.   Increase Synthroid 100 mcg daily TSH free T4 8 weeks

## 2022-02-08 NOTE — TELEPHONE ENCOUNTER
If patient would like a myelogram I recommend that she consult with her spine specialist that she is seen in the past.  Hold on CT of neck at this time

## 2022-02-08 NOTE — TELEPHONE ENCOUNTER
Wanted to clarify-[facility] radiology is who called stating if you order Ct neck WITH contrast you need to order a myelogram. You are wanting to hold on imaging and have pt to follow up with spine specialist for failed back syndrome? Proceed with Bilateral carotid US as ordered on 1/31 for syncope and collapse. That dx does not pass medicare. Please advise.

## 2022-02-09 NOTE — TELEPHONE ENCOUNTER
Patient notified. She is going to complete all the other testing first to see what the results show and then get back with us if she is needing another referral to [facility] or if they can just see her.

## 2022-03-03 ENCOUNTER — TELEPHONE (OUTPATIENT)
Dept: FAMILY MEDICINE CLINIC | Age: 57
End: 2022-03-03

## 2022-03-03 ENCOUNTER — HOSPITAL ENCOUNTER (OUTPATIENT)
Dept: NON INVASIVE DIAGNOSTICS | Age: 57
Discharge: HOME | End: 2022-03-03
Payer: MEDICARE

## 2022-03-03 ENCOUNTER — HOSPITAL ENCOUNTER (OUTPATIENT)
Dept: INTERVENTIONAL RADIOLOGY/VASCULAR | Age: 57
Discharge: HOME | End: 2022-03-03
Payer: MEDICARE

## 2022-03-03 DIAGNOSIS — R55 SYNCOPE AND COLLAPSE: ICD-10-CM

## 2022-03-03 DIAGNOSIS — R93.1 ABNORMAL ECHOCARDIOGRAM: Primary | ICD-10-CM

## 2022-03-03 LAB
LV EF: 63 %
LVEF MODALITY: NORMAL

## 2022-03-03 PROCEDURE — 93880 EXTRACRANIAL BILAT STUDY: CPT

## 2022-03-03 PROCEDURE — 93306 TTE W/DOPPLER COMPLETE: CPT

## 2022-03-03 PROCEDURE — 93660 TILT TABLE EVALUATION: CPT | Performed by: INTERNAL MEDICINE

## 2022-03-03 NOTE — PROCEDURES
[address] [id number]                                TILT TABLE TEST    PATIENT NAME: Tin Maldonado                        :        1965  MED REC NO:   [id number]                           ROOM:  ACCOUNT NO:   [de-identified]                           ADMIT DATE: 2022  PROVIDER:     Dina Tinoco MD    DATE OF STUDY:  2022    INDICATION:  Syncope and collapse. FINDINGS:  At baseline in spine position, the patient's blood pressure  was 141/76, heart rate was 71. The baseline 12-lead EKG was consistent  with normal sinus rhythm. Per protocol, the table was tilted to 7  degrees upright position. In this position for 30 minutes, vital signs  were checked every 1 minute. The patient reported no reproduction of  her symptoms. She had no significant dizziness or other complaints. There was no significant variation in the patient's blood pressure or  heart rate. EKG continued to show normal sinus rhythm. There was no  evidence for profound bradycardia or prolonged pauses. No evidence for  arrhythmia. At the end of the study, the patient was put back in spine  position. Blood pressure and heart rate remained stable. EKG continued  to show normal sinus rhythm.     CONCLUSION:  Unremarkable tilt table test.        Sudeep Shin MD    D: 2022 15:40:18       T: 2022 15:42:43     [id number]  Job#: [id number]     Doc#: [id number]    CC:

## 2022-03-03 NOTE — TELEPHONE ENCOUNTER
----- Message from Dylan Oris sent at 3/3/2022  4:25 PM EST -----  Subject: Message to Provider    QUESTIONS  Information for Provider? Pt had an echo done and was wondering if she   still needs the EKG or not. She was also wondering id her thyroid   medication should cause tremors or not.   ---------------------------------------------------------------------------  --------------  CALL BACK INFO  What is the best way for the office to contact you? OK to leave message on   voicemail  Preferred Call Back Phone Number? [phone]  ---------------------------------------------------------------------------  --------------  SCRIPT ANSWERS  Relationship to Patient?  Self

## 2022-03-04 ENCOUNTER — HOSPITAL ENCOUNTER (OUTPATIENT)
Age: 57
Discharge: HOME | End: 2022-03-04
Payer: MEDICARE

## 2022-03-04 DIAGNOSIS — R55 SYNCOPE AND COLLAPSE: ICD-10-CM

## 2022-03-04 LAB
EKG ATRIAL RATE: 76 BPM
EKG P AXIS: 67 DEGREES
EKG P-R INTERVAL: 172 MS
EKG Q-T INTERVAL: 406 MS
EKG QRS DURATION: 86 MS
EKG QTC CALCULATION (BAZETT): 456 MS
EKG R AXIS: 11 DEGREES
EKG T AXIS: 36 DEGREES
EKG VENTRICULAR RATE: 76 BPM

## 2022-03-04 PROCEDURE — 93005 ELECTROCARDIOGRAM TRACING: CPT

## 2022-03-04 NOTE — TELEPHONE ENCOUNTER
----- Message from DEYANIRA Bergman CNP sent at 3/4/2022  7:58 AM EST -----  Echo indicates normal left ventricular size. Ejection fraction within normal limits. Mild tricuspid regurgitation noted as well as slightly abnormal left ventricular relaxation/grade 1 dysfunction.   Recommend the patient consult with cardiology as well as get an updated EKG which the order is already placed.      ----- Message from DEYANIRA Bergman CNP sent at 3/4/2022  7:57 AM EST -----  Negative tilt table test

## 2022-03-04 NOTE — TELEPHONE ENCOUNTER
Patient notified- states she is going to do EKG today and ok with referral to [facility] cardiology. Patient asked again about her Hand tremors that started 2 weeks ago. When she is using her hands is when she has the tremors weeks but when she is keeping them still they don't happen. She is asking if it could be her thyroid medication or, what she should do.

## 2022-03-07 ENCOUNTER — OFFICE VISIT (OUTPATIENT)
Dept: CARDIOLOGY CLINIC | Age: 57
End: 2022-03-07
Payer: MEDICARE

## 2022-03-07 VITALS
WEIGHT: 133 LBS | DIASTOLIC BLOOD PRESSURE: 82 MMHG | SYSTOLIC BLOOD PRESSURE: 132 MMHG | HEART RATE: 100 BPM | HEIGHT: 60 IN | BODY MASS INDEX: 26.11 KG/M2

## 2022-03-07 DIAGNOSIS — R06.02 SOB (SHORTNESS OF BREATH): ICD-10-CM

## 2022-03-07 DIAGNOSIS — R55 SYNCOPE AND COLLAPSE: ICD-10-CM

## 2022-03-07 DIAGNOSIS — R42 DIZZINESS: Primary | ICD-10-CM

## 2022-03-07 PROCEDURE — G8419 CALC BMI OUT NRM PARAM NOF/U: HCPCS | Performed by: NUCLEAR MEDICINE

## 2022-03-07 PROCEDURE — G8484 FLU IMMUNIZE NO ADMIN: HCPCS | Performed by: NUCLEAR MEDICINE

## 2022-03-07 PROCEDURE — 3017F COLORECTAL CA SCREEN DOC REV: CPT | Performed by: NUCLEAR MEDICINE

## 2022-03-07 PROCEDURE — 1036F TOBACCO NON-USER: CPT | Performed by: NUCLEAR MEDICINE

## 2022-03-07 PROCEDURE — 99204 OFFICE O/P NEW MOD 45 MIN: CPT | Performed by: NUCLEAR MEDICINE

## 2022-03-07 PROCEDURE — G8427 DOCREV CUR MEDS BY ELIG CLIN: HCPCS | Performed by: NUCLEAR MEDICINE

## 2022-03-07 RX ORDER — ATENOLOL 25 MG/1
25 TABLET ORAL DAILY
Qty: 90 TABLET | Refills: 3 | Status: SHIPPED | OUTPATIENT
Start: 2022-03-07

## 2022-03-07 ASSESSMENT — ENCOUNTER SYMPTOMS
VOMITING: 0
ABDOMINAL DISTENTION: 0
BACK PAIN: 0
RECTAL PAIN: 0
BLOOD IN STOOL: 0
PHOTOPHOBIA: 0
NAUSEA: 0
SHORTNESS OF BREATH: 0
ANAL BLEEDING: 0
COLOR CHANGE: 0
ABDOMINAL PAIN: 0
CONSTIPATION: 0
DIARRHEA: 0
CHEST TIGHTNESS: 0

## 2022-03-07 NOTE — PROGRESS NOTES
[address]  [place] [address]  Dept: [phone]  Dept Fax: [phone]  Loc: [phone]    Visit Date: 3/7/2022    Kasie Smith is a 49 y.o. female who presents todayfor:  Chief Complaint   Patient presents with    New Patient    Loss of Consciousness    Hypertension   here for the first time  Had syncope  Started with dizziness  Then LOC for few minutes  Witnessed by her   Some seizure type situation   No previous episodes  Did have syncope as a kid  Tilt was okay   Echo was okay   Looks like she does have HTN  On medical RX  Does have shoulder and chest pain at times  Exertional fatigue   No known CAD  No smoking   Major family history of CAD  Mother sister and father   SCD in the family       HPI:  HPI  Past Medical History:   Diagnosis Date    Anxiety     Asthma     Autoimmune disease ([address]     Bipolar 1 disorder ([address]     Blood in urine     Bronchitis     Cataracts, bilateral     Degenerative cervical disc     Degenerative disc disease     Depression     Hypertension     Hypothyroidism     Interstitial cystitis     Iritis 2013    OU      Migraines     Osteopenia     PONV (postoperative nausea and vomiting)     Shortness of breath     Thyroid disease       Past Surgical History:   Procedure Laterality Date    BACK SURGERY      multiple injections and nerve blocks at cervical and lumbar    CERVICAL FUSION  11/09    c 4,5,6    CERVICAL FUSION  11/20/2015    CYSTOSCOPY N/A 4/25/2019    CYSTOSCOPY WITH HYDRODISTENTION WITH INSTILLATION OF DMSO AND MARCAINE [facility]    CYSTOSCOPY HYDRODISTENTION WITH DMSO, BLADDER BOTOX 100 UNITS performed by Wilfrid Alexis MD at [facility] (W OR W/O BIOPSY)  2011    [facility]. PAIN BLOCK Bilateral 11/6/2019    Third occipital nerve block bilateral. Left trigger point trapezius performed by Sally Torres MD at [address] [facility]  4/02    INCONTINENCE SURGERY      KNEE SURGERY      SPINAL CORD STIMULATOR IMPLANTATION N/A 11/20/2019    STAGE 1 / INSERTION OF INTERSTIM DEVICE performed by Tonya Hunter MD at [facility] N/A 12/4/2019    STAGE II STIMULATOR INSERTION performed by Tonya Hunter MD at [address] N/A 11/4/2021    INTERSTIM REMOVAL performed by Alejandra Rizvi MD at [address] [facility] History   Problem Relation Age of Onset    Rheum Arthritis Mother     Thyroid Disease Mother     Colon Polyps Mother         esophageal polyps    Other Mother     High Blood Pressure Father     Diabetes Father     High Blood Pressure Sister     Thyroid Disease Brother     High Blood Pressure Brother     Liver Cancer Maternal Uncle     Cancer Maternal Grandfather         pancreatic cancer    Colon Cancer Neg Hx      Social History     Tobacco Use    Smoking status: Former Smoker     Packs/day: 0.25     Years: 20.00     Pack years: 5.00     Types: Cigarettes     Start date: [facility]     Quit date: 2007     Years since quitting: 15.1    Smokeless tobacco: Never Used    Tobacco comment: 1 pack per week intermit for 20 yrs   Substance Use Topics    Alcohol use: No     Alcohol/week: 0.0 standard drinks      Current Outpatient Medications   Medication Sig Dispense Refill    levothyroxine (SYNTHROID) 100 MCG tablet Take 1 tablet by mouth daily 30 tablet 2    diazePAM (VALIUM) 5 MG tablet Take 1 tablet by mouth every 8 hours as needed for Anxiety or Sleep (spasms).  90 tablet 0    lamoTRIgine (LAMICTAL) 200 MG tablet Take 1 tablet by mouth daily 90 tablet 0    buPROPion (WELLBUTRIN XL) 150 MG extended release tablet Take 3 tablets by mouth every morning 270 tablet 0    Probiotic Product (PROBIOTIC PO) Take by mouth daily      montelukast (SINGULAIR) 10 MG tablet Take 1 tablet by mouth daily 30 tablet 5    cetirizine (ZYRTEC) 10 MG tablet Take 1 tablet by mouth daily 30 tablet 5    albuterol sulfate HFA (PROAIR HFA) 108 (90 Base) MCG/ACT inhaler Inhale 2 puffs into the lungs every 6 hours as needed for Wheezing 1 Inhaler 5    cyclobenzaprine (FLEXERIL) 10 MG tablet Take 1 tablet by mouth nightly as needed for Muscle spasms 30 tablet 1    lisinopril (PRINIVIL;ZESTRIL) 20 MG tablet Take 1 tablet by mouth daily 30 tablet 5    amitriptyline (ELAVIL) 25 MG tablet Take 1 tablet by mouth nightly (Patient not taking: Reported on 3/7/2022) 30 tablet 11     No current facility-administered medications for this visit.      Allergies   Allergen Reactions    Nurtec [Rimegepant Sulfate] Other (See Comments)     Makes her jittery    Abilify [Aripiprazole] Other (See Comments)     Vivid dreams    Cephalexin Nausea And Vomiting    Codeine Nausea And Vomiting    Excedrin Migraine [Aspirin-Acetaminophen-Caffeine] Nausea And Vomiting    Neurontin [Gabapentin] Anxiety    Nucynta [Tapentadol Hcl Er] Other (See Comments)     Terrible headache    Percocet [Oxycodone-Acetaminophen] Nausea And Vomiting    Tramadol Nausea And Vomiting    Tylenol With Codeine #3 [Acetaminophen-Codeine]      Nausea     Vicodin [Hydrocodone-Acetaminophen] Nausea And Vomiting     Health Maintenance   Topic Date Due    Hepatitis C screen  Never done    COVID-19 Vaccine (1) Never done    Pneumococcal 0-64 years Vaccine (1 of 2 - PPSV23) Never done    DTaP/Tdap/Td vaccine (1 - Tdap) Never done    Shingles Vaccine (1 of 2) Never done    A1C test (Diabetic or Prediabetic)  12/12/2017    Annual Wellness Visit (AWV)  Never done    Flu vaccine (1) Never done    Depression Monitoring  01/31/2023    TSH testing  02/01/2023    Potassium monitoring  02/01/2023    Creatinine monitoring  02/01/2023    Breast cancer screen  12/29/2023    Lipid screen  08/10/2025    Colorectal Cancer Screen  08/06/2030    HIV screen  Completed  Hepatitis A vaccine  Aged Out    Hepatitis B vaccine  Aged Out    Hib vaccine  Aged Out    Meningococcal (ACWY) vaccine  Aged Out       Subjective:  Review of Systems   Constitutional: Positive for fatigue. HENT: Negative for ear pain and nosebleeds. Eyes: Negative for photophobia. Respiratory: Negative for chest tightness and shortness of breath. Cardiovascular: Negative for chest pain and palpitations. Gastrointestinal: Negative for abdominal distention, abdominal pain, anal bleeding, blood in stool, constipation, diarrhea, nausea, rectal pain and vomiting. Endocrine: Negative for polyphagia. Genitourinary: Negative for dysuria and frequency. Musculoskeletal: Negative for arthralgias, back pain, gait problem, joint swelling, myalgias, neck pain and neck stiffness. Skin: Negative for color change, pallor, rash and wound. Allergic/Immunologic: Negative for food allergies. Neurological: Positive for dizziness, syncope and light-headedness. Psychiatric/Behavioral: Negative for confusion, decreased concentration, dysphoric mood, hallucinations and self-injury. The patient is not nervous/anxious and is not hyperactive. Objective:  Physical Exam  HENT:      Head: Normocephalic. Nose: Nose normal.      Mouth/Throat:      Mouth: Mucous membranes are moist.   Eyes:      Pupils: Pupils are equal, round, and reactive to light. Cardiovascular:      Rate and Rhythm: Normal rate. Heart sounds: Murmur heard. No gallop. Pulmonary:      Effort: No respiratory distress. Breath sounds: No stridor. No wheezing, rhonchi or rales. Chest:      Chest wall: No tenderness. Abdominal:      General: There is no distension. Palpations: There is no mass. Tenderness: There is no abdominal tenderness. There is no right CVA tenderness, guarding or rebound. Hernia: No hernia is present.    Musculoskeletal:         General: No swelling, tenderness, deformity or signs of injury. Cervical back: Normal range of motion. Right lower leg: No edema. Left lower leg: No edema. Skin:     Coloration: Skin is not jaundiced or pale. Findings: No bruising, erythema, lesion or rash. Neurological:      General: No focal deficit present. Mental Status: She is alert. Cranial Nerves: No cranial nerve deficit. Sensory: No sensory deficit. Motor: No weakness. Coordination: Coordination normal.      Gait: Gait normal.      Deep Tendon Reflexes: Reflexes normal.   Psychiatric:         Mood and Affect: Mood normal.       /82   Pulse 100   Ht 5' (1.524 m)   Wt 133 lb (60.3 kg)   BMI 25.97 kg/m²     Assessment:      Diagnosis Orders   1. Dizziness     2. Syncope and collapse     concerning patient  Syncope  Cardiac vs neurogenic  Chest pain symptoms      Plan:  No follow-ups on file. As above  Event   Stress  Check BP   Stop lisinopril and monitor the BP   She had been taking her meds PRN   Consider neurology follow   Continue risk factor modification and medical management  Thank you for allowing me to participate in the care of your patient. Please don't hesitate to contact me regarding any further issues related to the patient care    Orders Placed:  No orders of the defined types were placed in this encounter. Medications Prescribed:  No orders of the defined types were placed in this encounter. Discussed use, benefit, and side effects of prescribed medications. All patient questions answered. Pt voicedunderstanding. Instructed to continue current medications, diet and exercise. Continue risk factor modification and medical management. Patient agreed with treatment plan. Follow up as directed.     Electronically signedby Mildred Wang MD on 3/7/2022 at 12:17 PM

## 2022-03-09 ENCOUNTER — HOSPITAL ENCOUNTER (OUTPATIENT)
Dept: NON INVASIVE DIAGNOSTICS | Age: 57
Discharge: HOME | End: 2022-03-09
Payer: MEDICARE

## 2022-03-09 DIAGNOSIS — R42 DIZZINESS: ICD-10-CM

## 2022-03-09 DIAGNOSIS — R55 SYNCOPE AND COLLAPSE: ICD-10-CM

## 2022-03-09 DIAGNOSIS — R06.02 SOB (SHORTNESS OF BREATH): ICD-10-CM

## 2022-03-09 PROCEDURE — 93270 REMOTE 30 DAY ECG REV/REPORT: CPT

## 2022-03-10 ENCOUNTER — TELEPHONE (OUTPATIENT)
Dept: CARDIOLOGY CLINIC | Age: 57
End: 2022-03-10

## 2022-03-16 ENCOUNTER — HOSPITAL ENCOUNTER (OUTPATIENT)
Dept: NON INVASIVE DIAGNOSTICS | Age: 57
Discharge: HOME | End: 2022-03-16
Payer: MEDICARE

## 2022-03-16 DIAGNOSIS — R06.02 SOB (SHORTNESS OF BREATH): ICD-10-CM

## 2022-03-16 DIAGNOSIS — R55 SYNCOPE AND COLLAPSE: ICD-10-CM

## 2022-03-16 DIAGNOSIS — R42 DIZZINESS: ICD-10-CM

## 2022-03-16 PROCEDURE — 93017 CV STRESS TEST TRACING ONLY: CPT | Performed by: NUCLEAR MEDICINE

## 2022-03-16 PROCEDURE — A9500 TC99M SESTAMIBI: HCPCS | Performed by: NUCLEAR MEDICINE

## 2022-03-16 PROCEDURE — 78452 HT MUSCLE IMAGE SPECT MULT: CPT

## 2022-03-16 PROCEDURE — 3430000000 HC RX DIAGNOSTIC RADIOPHARMACEUTICAL: Performed by: NUCLEAR MEDICINE

## 2022-03-16 PROCEDURE — 6360000002 HC RX W HCPCS

## 2022-03-16 RX ADMIN — Medication 29.3 MILLICURIE: at 13:02

## 2022-03-16 RX ADMIN — Medication 8.8 MILLICURIE: at 12:10

## 2022-03-17 PROCEDURE — 93018 CV STRESS TEST I&R ONLY: CPT | Performed by: NUCLEAR MEDICINE

## 2022-03-17 PROCEDURE — 78452 HT MUSCLE IMAGE SPECT MULT: CPT | Performed by: NUCLEAR MEDICINE

## 2022-03-17 PROCEDURE — 93016 CV STRESS TEST SUPVJ ONLY: CPT | Performed by: NUCLEAR MEDICINE

## 2022-03-22 ENCOUNTER — TELEPHONE (OUTPATIENT)
Dept: CARDIOLOGY CLINIC | Age: 57
End: 2022-03-22

## 2022-03-22 NOTE — TELEPHONE ENCOUNTER
Pt called asking for stress test results. Results given. Pt asking if okay to take her 30 day monitor off, applied 3/9/22. She is on her last patch. Offered to get her more patches and instructed her that it is best if kept on for the ordered 30 days. \"Well since everything is looking good, I'd really like to just take it off early. \" Please advise.

## 2022-03-28 DIAGNOSIS — M96.1 FAILED NECK SYNDROME: ICD-10-CM

## 2022-03-28 DIAGNOSIS — F33.41 RECURRENT MAJOR DEPRESSIVE DISORDER, IN PARTIAL REMISSION (HCC): ICD-10-CM

## 2022-03-28 RX ORDER — DIAZEPAM 5 MG/1
5 TABLET ORAL EVERY 8 HOURS PRN
Qty: 90 TABLET | Refills: 0 | Status: SHIPPED | OUTPATIENT
Start: 2022-03-28 | End: 2022-06-23 | Stop reason: SDUPTHER

## 2022-03-28 RX ORDER — BUPROPION HYDROCHLORIDE 150 MG/1
450 TABLET ORAL EVERY MORNING
Qty: 270 TABLET | Refills: 0 | Status: SHIPPED | OUTPATIENT
Start: 2022-03-28 | End: 2022-06-22 | Stop reason: SDUPTHER

## 2022-03-28 NOTE — TELEPHONE ENCOUNTER
Jenny Martinez called into the office stating that she needs a refill on her Wellbutrin XL 150mg;#270 with 0 refills; last with a start date of 11/03/21. She reports that she ran out today. She has not been seen in quite some time (11/2021)l she is now scheduled to return on the next avialable 05/11/22. Medication is pending your approval for a 90 day supply with 0 refills.

## 2022-03-28 NOTE — TELEPHONE ENCOUNTER
----- Message from Niyah Sandoval sent at 3/28/2022  8:41 AM EDT -----  Subject: Refill Request    QUESTIONS  Name of Medication? diazePAM (VALIUM) 5 MG tablet  Patient-reported dosage and instructions? 1po tid prn  How many days do you have left? 0  Preferred Pharmacy? [facility]  Pharmacy phone number (if available)? [phone]  ---------------------------------------------------------------------------  --------------  CALL BACK INFO  What is the best way for the office to contact you?  OK to leave message on   voicemail  Preferred Call Back Phone Number? [phone]

## 2022-03-30 NOTE — PROCEDURES
[address] [place] [address]                                 EVENT MONITOR    PATIENT NAME: Ghassan Lopez                        :        1965  MED REC NO:   [id number]                           ROOM:  ACCOUNT NO:   [de-identified]                           ADMIT DATE: 2022  PROVIDER:     Marlen Parks M.D.    TEST TYPE:  Event monitor. CLINICAL HISTORY AND INDICATION:  This is a patient with dizziness and  lightheadedness. EVENT MONITOR DESCRIPTION:  Event monitor was attached to the patient  between 2022 and 2022. EVENT MONITOR FINDINGS:  Baseline rhythm showed sinus rhythm with no  significant arrhythmias, otherwise unremarkable event monitor. CONCLUSION:  Unremarkable event monitor.         Rosalinda Goltz, M.D.    D: 2022 7:03:30       T: 2022 8:07:05     [id number]  Job#: [id number]     Doc#: [id number]    CC:

## 2022-04-06 RX ORDER — LAMOTRIGINE 200 MG/1
200 TABLET ORAL DAILY
Qty: 90 TABLET | Refills: 0 | Status: SHIPPED | OUTPATIENT
Start: 2022-04-06 | End: 2022-06-22 | Stop reason: SDUPTHER

## 2022-04-06 NOTE — TELEPHONE ENCOUNTER
Mynor Thomson called requesting a refill on the following medications:  Requested Prescriptions     Pending Prescriptions Disp Refills    lamoTRIgine (LAMICTAL) 200 MG tablet 90 tablet 0     Sig: Take 1 tablet by mouth daily     [facility] also states Urology has prescribed Amitriptyline 25 mg daily for interstitial cystitis and wants to make sure it's ok for her to take this along with her other psychiatric medications?        Date of last visit: 11/3/2021  Date of next visit (if applicable):5/11/2022  Pharmacy Name: [facility]      [place]

## 2022-04-20 ENCOUNTER — TELEPHONE (OUTPATIENT)
Dept: FAMILY MEDICINE CLINIC | Age: 57
End: 2022-04-20

## 2022-04-20 ENCOUNTER — TELEPHONE (OUTPATIENT)
Dept: PSYCHOLOGY | Age: 57
End: 2022-04-20

## 2022-04-20 NOTE — TELEPHONE ENCOUNTER
----- Message from Maria Isabel Krishna sent at 4/20/2022  9:24 AM EDT -----  Subject: Message to Provider    QUESTIONS  Information for Provider? pt called in and said that she has had pressure   behind her eyes, sneezing and coughing and was wanting to know some kind   of medication could be sent to the pharmacy [address] [facility],   [place] [address] Pat JARAMILLO [phone]   ---------------------------------------------------------------------------  --------------  Floyd ESCAMILLA  What is the best way for the office to contact you? OK to leave message on   voicemail  Preferred Call Back Phone Number? [phone]  ---------------------------------------------------------------------------  --------------  SCRIPT ANSWERS  Relationship to Patient?  Self

## 2022-04-20 NOTE — TELEPHONE ENCOUNTER
Pt called in. She states  Her urologist would like for her to check with this provider to see if it would be ok to take Amitriptyline. She states this would be prescribed to help treat her interstitial cystitis.  Please advise

## 2022-05-02 RX ORDER — LEVOTHYROXINE SODIUM 0.1 MG/1
100 TABLET ORAL DAILY
Qty: 30 TABLET | Refills: 2 | Status: SHIPPED | OUTPATIENT
Start: 2022-05-02 | End: 2022-05-16 | Stop reason: SDUPTHER

## 2022-05-02 NOTE — TELEPHONE ENCOUNTER
----- [facility] sent at 5/2/2022  8:33 AM EDT -----  Subject: Refill Request    QUESTIONS  Name of Medication? levothyroxine (SYNTHROID) 100 MCG tablet  Patient-reported dosage and instructions? Take 1 tablet by mouth daily  How many days do you have left? 4  Preferred Pharmacy? [facility]  Pharmacy phone number (if available)? [phone]  ---------------------------------------------------------------------------  --------------  CALL BACK INFO  What is the best way for the office to contact you? OK to leave message on   voicemail  Preferred Call Back Phone Number? [phone]  ---------------------------------------------------------------------------  --------------  SCRIPT ANSWERS  Relationship to Patient?  Self

## 2022-05-16 RX ORDER — LEVOTHYROXINE SODIUM 0.1 MG/1
100 TABLET ORAL DAILY
Qty: 30 TABLET | Refills: 2 | Status: SHIPPED | OUTPATIENT
Start: 2022-05-16 | End: 2022-06-01 | Stop reason: ALTCHOICE

## 2022-05-16 NOTE — TELEPHONE ENCOUNTER
----- Message from Austin Coulter sent at 5/16/2022  8:28 AM EDT -----  Subject: Refill Request    QUESTIONS  Name of Medication? levothyroxine (SYNTHROID) 100 MCG tablet  Patient-reported dosage and instructions? Once per day  How many days do you have left? 0  Preferred Pharmacy? [facility]  Pharmacy phone number (if available)? [phone]  ---------------------------------------------------------------------------  --------------  CALL BACK INFO  What is the best way for the office to contact you? OK to leave message on   voicemail  Preferred Call Back Phone Number? [phone]  ---------------------------------------------------------------------------  --------------  SCRIPT ANSWERS  Relationship to Patient?  Self

## 2022-05-31 ENCOUNTER — TELEPHONE (OUTPATIENT)
Dept: CARDIOLOGY CLINIC | Age: 57
End: 2022-05-31

## 2022-05-31 ENCOUNTER — HOSPITAL ENCOUNTER (OUTPATIENT)
Age: 57
Discharge: HOME | End: 2022-05-31
Payer: MEDICARE

## 2022-05-31 ENCOUNTER — OFFICE VISIT (OUTPATIENT)
Dept: UROLOGY | Age: 57
End: 2022-05-31
Payer: MEDICARE

## 2022-05-31 VITALS
DIASTOLIC BLOOD PRESSURE: 84 MMHG | SYSTOLIC BLOOD PRESSURE: 122 MMHG | WEIGHT: 134.3 LBS | HEIGHT: 60 IN | BODY MASS INDEX: 26.37 KG/M2

## 2022-05-31 DIAGNOSIS — E03.9 HYPOTHYROIDISM, UNSPECIFIED TYPE: ICD-10-CM

## 2022-05-31 DIAGNOSIS — N32.81 OAB (OVERACTIVE BLADDER): Primary | ICD-10-CM

## 2022-05-31 DIAGNOSIS — N39.41 URGE INCONTINENCE: ICD-10-CM

## 2022-05-31 DIAGNOSIS — R31.0 GROSS HEMATURIA: ICD-10-CM

## 2022-05-31 LAB
BILIRUBIN URINE: ABNORMAL
BLOOD URINE, POC: ABNORMAL
CHARACTER, URINE: CLEAR
COLOR, URINE: YELLOW
GLUCOSE URINE: NEGATIVE MG/DL
KETONES, URINE: ABNORMAL
LEUKOCYTE CLUMPS, URINE: ABNORMAL
NITRITE, URINE: NEGATIVE
PH, URINE: 5.5 (ref 5–9)
POST VOID RESIDUAL (PVR): 33 ML
PROTEIN, URINE: ABNORMAL MG/DL
SPECIFIC GRAVITY, URINE: >= 1.03 (ref 1–1.03)
T4 FREE: 1.99 NG/DL (ref 0.93–1.76)
TSH SERPL DL<=0.05 MIU/L-ACNC: 0.29 UIU/ML (ref 0.4–4.2)
UROBILINOGEN, URINE: 0.2 EU/DL (ref 0–1)

## 2022-05-31 PROCEDURE — 84439 ASSAY OF FREE THYROXINE: CPT

## 2022-05-31 PROCEDURE — 36415 COLL VENOUS BLD VENIPUNCTURE: CPT

## 2022-05-31 PROCEDURE — 3017F COLORECTAL CA SCREEN DOC REV: CPT | Performed by: NURSE PRACTITIONER

## 2022-05-31 PROCEDURE — 99214 OFFICE O/P EST MOD 30 MIN: CPT | Performed by: NURSE PRACTITIONER

## 2022-05-31 PROCEDURE — 1036F TOBACCO NON-USER: CPT | Performed by: NURSE PRACTITIONER

## 2022-05-31 PROCEDURE — G8427 DOCREV CUR MEDS BY ELIG CLIN: HCPCS | Performed by: NURSE PRACTITIONER

## 2022-05-31 PROCEDURE — 84443 ASSAY THYROID STIM HORMONE: CPT

## 2022-05-31 PROCEDURE — 51798 US URINE CAPACITY MEASURE: CPT | Performed by: NURSE PRACTITIONER

## 2022-05-31 PROCEDURE — G8419 CALC BMI OUT NRM PARAM NOF/U: HCPCS | Performed by: NURSE PRACTITIONER

## 2022-05-31 PROCEDURE — 81003 URINALYSIS AUTO W/O SCOPE: CPT | Performed by: NURSE PRACTITIONER

## 2022-05-31 RX ORDER — SOLIFENACIN SUCCINATE 10 MG/1
10 TABLET, FILM COATED ORAL DAILY
Qty: 30 TABLET | Refills: 3 | Status: SHIPPED | OUTPATIENT
Start: 2022-05-31 | End: 2023-05-31

## 2022-05-31 RX ORDER — AMITRIPTYLINE HYDROCHLORIDE 10 MG/1
10 TABLET, FILM COATED ORAL NIGHTLY
Qty: 90 TABLET | Refills: 1 | Status: SHIPPED | OUTPATIENT
Start: 2022-05-31

## 2022-05-31 RX ORDER — POLYETHYLENE GLYCOL 3350 17 G/17G
17 POWDER, FOR SOLUTION ORAL DAILY
Qty: 1530 G | Refills: 2 | Status: SHIPPED | OUTPATIENT
Start: 2022-05-31 | End: 2022-06-30

## 2022-05-31 NOTE — TELEPHONE ENCOUNTER
Patient wants to reschedule her appt 6-6-22. She will be out of town. I Couldn't find an appt outside of October It is a 3 month appt I called the office to get assistance with scheduling this appt No answer. The patient wants the office to call her to set another appt.  Please do not sched [place] 6-10

## 2022-05-31 NOTE — PROGRESS NOTES
Product (PROBIOTIC PO) Take by mouth daily        montelukast (SINGULAIR) 10 MG tablet Take 1 tablet by mouth daily 30 tablet 5    albuterol sulfate HFA (PROAIR HFA) 108 (90 Base) MCG/ACT inhaler Inhale 2 puffs into the lungs every 6 hours as needed for Wheezing 1 Inhaler 5    cyclobenzaprine (FLEXERIL) 10 MG tablet Take 1 tablet by mouth nightly as needed for Muscle spasms 30 tablet 1    cetirizine (ZYRTEC) 10 MG tablet Take 1 tablet by mouth daily (Patient not taking: Reported on 5/31/2022) 30 tablet 5      No current facility-administered medications for this visit. Past Medical History  Kraig Correia  has a past medical history of Anxiety, Asthma, Autoimmune disease ([facility] [address] Bipolar 1 disorder ([facility] [address] Blood in urine, Bronchitis, Cataracts, bilateral, Degenerative cervical disc, Degenerative disc disease, Depression, Hypertension, Hypothyroidism, Interstitial cystitis, Iritis, Migraines, Osteopenia, PONV (postoperative nausea and vomiting), Shortness of breath, and Thyroid disease. Past Surgical History  The patient  has a past surgical history that includes Hysterectomy (4/02); cervical fusion (11/09); Fulguration Minor Bladder Lesion (with o (2011); Incontinence surgery; Cystoscopy (N/A, 4/25/2019); back surgery; Pain Block (Bilateral, 11/6/2019); spinal cord stimulator implantation (N/A, 11/20/2019); cervical fusion (11/20/2015); spinal cord stimulator implantation (N/A, 12/4/2019); Cystoscopy (N/A, 11/4/2021); Stimulator Surgery (N/A, 11/4/2021); and knee surgery. Family History  This patient's family history includes Cancer in her maternal grandfather; Colon Polyps in her mother; Diabetes in her father; High Blood Pressure in her brother, father, and sister; Socrates Sero in her maternal uncle; Other in her mother; Rheum Arthritis in her mother; Thyroid Disease in her brother and mother. Social History  Kraig Correia  reports that she quit smoking about 15 years ago. Her smoking use included cigarettes. She started smoking about 35 years ago. She has a 5.00 pack-year smoking history. She has never used smokeless tobacco. She reports that she does not drink alcohol and does not use drugs. Subjective:      Review of Systems  Constitutional: Negative for activity change, appetite change, chills, diaphoresis, fatigue, fever and unexpected weight change. Gastrointestinal: Negative for abdominal pain, nausea and vomiting. Genitourinary: Positive for frequency, hematuria and urgency. Negative for decreased urine volume, difficulty urinating, dysuria and flank pain. Pelvic pain   Musculoskeletal: Negative for back pain. Objective:   /84   Ht 5' (1.524 m)   Wt 134 lb 4.8 oz (60.9 kg)   BMI 26.23 kg/m²      Physical Exam  Vitals reviewed. Constitutional:       General: She is not in acute distress. Appearance: Normal appearance. She is well-developed. She is not ill-appearing or diaphoretic. HENT:     Head: Normocephalic and atraumatic. Right Ear: External ear normal.      Left Ear: External ear normal.      Nose: Nose normal.      Mouth/Throat:      Mouth: Mucous membranes are moist.   Eyes:     General: No scleral icterus. Right eye: No discharge. Left eye: No discharge. Neck:     Vascular: No JVD. Trachea: No tracheal deviation. Pulmonary:     Effort: Pulmonary effort is normal. No respiratory distress. Abdominal:      General: There is no distension. Tenderness: There is abdominal tenderness (suprapubic). There is no right CVA tenderness or left CVA tenderness. Musculoskeletal:         General: No tenderness. Normal range of motion. Neurological:     Mental Status: She is alert and oriented to person, place, and time. Mental status is at baseline. Psychiatric:         Mood and Affect: Mood normal.         Behavior: Behavior normal.         Thought Content:  Thought content normal.           POC        Results for POC orders placed in visit on 05/31/22   POCT Urinalysis No Micro (Auto)   Result Value Ref Range     Glucose, Ur Negative NEGATIVE mg/dl     Bilirubin Urine Small (A)       Ketones, Urine Trace (A) NEGATIVE     Specific Gravity, Urine >= 1.030 1.002 - 1.030     Blood, UA POC Moderate (A) NEGATIVE     pH, Urine 5.50 5.0 - 9.0     Protein, Urine Trace (A) NEGATIVE mg/dl     Urobilinogen, Urine 0.20 0.0 - 1.0 eu/dl     Nitrite, Urine Negative NEGATIVE     Leukocyte Clumps, Urine Small (A) NEGATIVE     Color, Urine Yellow YELLOW-STRAW     Character, Urine Clear [id number].[id number]   poct post void residual   Result Value Ref Range     post void residual 33 ml         Patients recent PSA values are as follows  No results found for: PSA, PSADIA     Recent BUN/Creatinine:        Lab Results   Component Value Date     BUN 15 02/01/2022     CREATININE 0.6 02/01/2022         Assessment:   OAB with mixed incontinence  IC  Gross hematuria and hx of micro hematuria  Left renal lesions  Chronic constipation  Plan:      Pt reports Elavil 25 mg making her too sleepy although helping with symptoms. Will trial decreasing dose to 10 mg nightly. Send urine today for culture and treat if significant for infection. Check imaging given reports of gross hematuria and hx of renal lesions. Start daily miralax--titrate to effect. May need to take twice daily or if having too frequent of bowel movements then every other day. Trial Vesicare 10 mg daily for OAB and mixed incontinence. F/u in 2-3 months with PVR. CTU prior to appt.

## 2022-06-01 ENCOUNTER — TELEPHONE (OUTPATIENT)
Dept: UROLOGY | Age: 57
End: 2022-06-01

## 2022-06-01 ENCOUNTER — TELEPHONE (OUTPATIENT)
Dept: FAMILY MEDICINE CLINIC | Age: 57
End: 2022-06-01

## 2022-06-01 DIAGNOSIS — E03.9 HYPOTHYROIDISM, UNSPECIFIED TYPE: Primary | ICD-10-CM

## 2022-06-01 LAB
ORGANISM: ABNORMAL
URINE CULTURE, ROUTINE: ABNORMAL

## 2022-06-01 RX ORDER — LEVOTHYROXINE SODIUM 88 UG/1
88 TABLET ORAL DAILY
Qty: 90 TABLET | Refills: 1 | Status: SHIPPED | OUTPATIENT
Start: 2022-06-01

## 2022-06-03 ENCOUNTER — TELEPHONE (OUTPATIENT)
Dept: UROLOGY | Age: 57
End: 2022-06-03

## 2022-06-03 RX ORDER — NITROFURANTOIN 25; 75 MG/1; MG/1
100 CAPSULE ORAL 2 TIMES DAILY
Qty: 20 CAPSULE | Refills: 0 | Status: SHIPPED | OUTPATIENT
Start: 2022-06-03 | End: 2022-06-13

## 2022-06-03 NOTE — TELEPHONE ENCOUNTER
Called pt regarding her urine culture, Macrobid sent to pharmacy and to start taking it until completed.

## 2022-06-03 NOTE — TELEPHONE ENCOUNTER
Left message that pt urine culture was significant for infection. Lindsay Cardenas was sent to pharmacy, advised pt to start medication and take until it is completed.

## 2022-06-03 NOTE — TELEPHONE ENCOUNTER
Pt's urine culture significant for infection. Script for [employer] sent to pharmacy. Please notify pt to start medication and take to completion.

## 2022-06-09 DIAGNOSIS — F33.41 RECURRENT MAJOR DEPRESSIVE DISORDER, IN PARTIAL REMISSION (HCC): ICD-10-CM

## 2022-06-09 DIAGNOSIS — M96.1 FAILED NECK SYNDROME: ICD-10-CM

## 2022-06-09 RX ORDER — ALBUTEROL SULFATE 90 UG/1
2 AEROSOL, METERED RESPIRATORY (INHALATION) EVERY 6 HOURS PRN
OUTPATIENT
Start: 2022-06-09

## 2022-06-09 RX ORDER — DIAZEPAM 5 MG/1
5 TABLET ORAL EVERY 8 HOURS PRN
Qty: 90 TABLET | Refills: 0 | OUTPATIENT
Start: 2022-06-09 | End: 2023-06-09

## 2022-06-09 NOTE — TELEPHONE ENCOUNTER
Please approve or deny     Last Visit Date:  1/31/2022       Next Visit Date:    Visit date not found

## 2022-06-22 ENCOUNTER — OFFICE VISIT (OUTPATIENT)
Dept: PSYCHIATRY | Age: 57
End: 2022-06-22
Payer: MEDICARE

## 2022-06-22 DIAGNOSIS — F41.1 GAD (GENERALIZED ANXIETY DISORDER): ICD-10-CM

## 2022-06-22 DIAGNOSIS — F31.81 BIPOLAR II DISORDER (HCC): Primary | ICD-10-CM

## 2022-06-22 PROCEDURE — 3017F COLORECTAL CA SCREEN DOC REV: CPT | Performed by: NURSE PRACTITIONER

## 2022-06-22 PROCEDURE — G8419 CALC BMI OUT NRM PARAM NOF/U: HCPCS | Performed by: NURSE PRACTITIONER

## 2022-06-22 PROCEDURE — G8428 CUR MEDS NOT DOCUMENT: HCPCS | Performed by: NURSE PRACTITIONER

## 2022-06-22 PROCEDURE — 1036F TOBACCO NON-USER: CPT | Performed by: NURSE PRACTITIONER

## 2022-06-22 PROCEDURE — 99214 OFFICE O/P EST MOD 30 MIN: CPT | Performed by: NURSE PRACTITIONER

## 2022-06-22 RX ORDER — LAMOTRIGINE 200 MG/1
200 TABLET ORAL DAILY
Qty: 90 TABLET | Refills: 0 | Status: SHIPPED | OUTPATIENT
Start: 2022-06-22 | End: 2022-09-26 | Stop reason: SDUPTHER

## 2022-06-22 RX ORDER — BUPROPION HYDROCHLORIDE 150 MG/1
450 TABLET ORAL EVERY MORNING
Qty: 270 TABLET | Refills: 0 | Status: SHIPPED | OUTPATIENT
Start: 2022-06-22 | End: 2022-09-26 | Stop reason: SDUPTHER

## 2022-06-22 NOTE — PROGRESS NOTES
Progress Note                    6-                                                              CC: Bipolar II D/O;  ZOE     HPI  Jovana Santana is seen for follow up and  medication management. States her meds continue to work well for her. Denies having  side effects.  .Denies having a rash. Good med compliance is still  reported. Denies having depression or anxiety. Denies feelings of harm towards self or others. States she continues to deal  with  pain and feels this is still her primary issue. Reports being in counseling with Dr Melody Gee here at [facility]  remains  supportive. Reports appetite and sleep are still \"ok\" States she still spends time with her grandchildren.  States she and  are going to take a vacation to [place] soon.  Labs reviewed draw 5- Via Cailin Chase with Diff, TSH, CMP  No critical abnormals noted.   Reports still  being on Valium Rxed by PCP for anxiety and sleep. Client advised again Valium would not be Rxed from this office.      Past Medical Hx:  Reports allergies to Tramadol. Codeine, Percocets, Vicodin  Reports having Hypothyroidism, Migraine Headaches, HTN. DDD, Asthma, Osteoarthritis  Reports partial hysterectomy 2001     Past Psychiatric Hx:  Denies any past psych hospitalizations. Denies any past suicidal gestures. Denies ever being under care of psychiatrist. Reports receives counseling at [facility]. Currently xanax Rxed by PCP and Valium Rxed by PCP. 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 MARITAL STATUS: Currently  2nd marriage. For 24 years. Reports relationship is good. OCCUPATION: Currently on Disability.  Last worked at [employer] left there 2015  [address]: Reports having associates degree in [employer]. LIVING SITUATION: [place] Has 2 grown children. LEGAL:  Reports past 2 DUI's. Last was 1994     MSE:  Level of consciousness: Alert  Appearance:  fair grooming   Behavior/Motor: no abnormalities noted   Attitude toward examiner: cooperative   Speech: Normal volume, goal directed, NRR  Mood: Euthymic  Affect: Reactive  Thought processes: Linear and goal directed   Suicidal Ideation: Denies suicidal ideations  Homicidal ideation: Denies homicidal ideations  Delusions: No evidence of delusions is observed  Perceptual Disturbance: Denies AH/VH;  No evidence of psychosis is observed. Cognition: Oriented to person, place, time and situation   Concentration fair   Memory intact   Insight: Fair  Judgment: Fair     ROS:  Constitutional: Negative for fever, chills and fatigue. HENT: Negative for ear pain, congestion, rhinorrhea and neck pain. Eyes: Negative for pain and discharge. Respiratory: Negative for cough, chest tightness and wheezing. Repots being on Advair for asthma  Cardiovascular: Negative for chest pain, palpitations and leg swelling. Gastrointestinal: Negative for nausea, vomiting and diarrhea. Genitourinary: Reports will have bladder surgery tomorrow  Musculoskeletal: Reports still having chronic back and neck  pain. Being followed by Dr Triny Saavedra. Skin: Negative for rash. Neurological: Negative for dizziness, weakness. Reports has migraine headaches     Impression:  Bipolar II D/O  ZOE     Plan:  Continue current meds:  Lamictal 200mg po q am    Wellbutrin XL 450mg po q am   Med education given. Anthony Pacheco voiced understanding of education given.   Advised to continue  counseling  with Dr Quan Remy advised to call [phone] and or go to nearest ED if symptoms worsen or has feelings of harm towards self or others.  Anthony Pacheco voiced understanding and agreement with safety plan   RTC 3 mos; sooner if needed

## 2022-06-23 ENCOUNTER — OFFICE VISIT (OUTPATIENT)
Dept: FAMILY MEDICINE CLINIC | Age: 57
End: 2022-06-23
Payer: MEDICARE

## 2022-06-23 VITALS
DIASTOLIC BLOOD PRESSURE: 60 MMHG | OXYGEN SATURATION: 96 % | RESPIRATION RATE: 18 BRPM | HEART RATE: 82 BPM | SYSTOLIC BLOOD PRESSURE: 120 MMHG | WEIGHT: 134 LBS | BODY MASS INDEX: 26.17 KG/M2 | TEMPERATURE: 98.1 F

## 2022-06-23 DIAGNOSIS — E03.9 HYPOTHYROIDISM, UNSPECIFIED TYPE: Primary | ICD-10-CM

## 2022-06-23 DIAGNOSIS — F33.2 SEVERE EPISODE OF RECURRENT MAJOR DEPRESSIVE DISORDER, WITHOUT PSYCHOTIC FEATURES (HCC): ICD-10-CM

## 2022-06-23 DIAGNOSIS — F33.41 RECURRENT MAJOR DEPRESSIVE DISORDER, IN PARTIAL REMISSION (HCC): ICD-10-CM

## 2022-06-23 DIAGNOSIS — I10 ESSENTIAL HYPERTENSION: ICD-10-CM

## 2022-06-23 DIAGNOSIS — J45.20 MILD INTERMITTENT REACTIVE AIRWAY DISEASE WITHOUT COMPLICATION: ICD-10-CM

## 2022-06-23 DIAGNOSIS — M96.1 FAILED NECK SYNDROME: ICD-10-CM

## 2022-06-23 DIAGNOSIS — F31.70 BIPOLAR DISORDER IN PARTIAL REMISSION, MOST RECENT EPISODE UNSPECIFIED TYPE (HCC): ICD-10-CM

## 2022-06-23 PROCEDURE — 1036F TOBACCO NON-USER: CPT | Performed by: NURSE PRACTITIONER

## 2022-06-23 PROCEDURE — 99214 OFFICE O/P EST MOD 30 MIN: CPT | Performed by: NURSE PRACTITIONER

## 2022-06-23 PROCEDURE — 3017F COLORECTAL CA SCREEN DOC REV: CPT | Performed by: NURSE PRACTITIONER

## 2022-06-23 PROCEDURE — G8427 DOCREV CUR MEDS BY ELIG CLIN: HCPCS | Performed by: NURSE PRACTITIONER

## 2022-06-23 PROCEDURE — G8419 CALC BMI OUT NRM PARAM NOF/U: HCPCS | Performed by: NURSE PRACTITIONER

## 2022-06-23 RX ORDER — CYCLOBENZAPRINE HCL 10 MG
10 TABLET ORAL NIGHTLY PRN
Qty: 30 TABLET | Refills: 1 | Status: SHIPPED | OUTPATIENT
Start: 2022-06-23

## 2022-06-23 RX ORDER — ALBUTEROL SULFATE 90 UG/1
2 AEROSOL, METERED RESPIRATORY (INHALATION) EVERY 6 HOURS PRN
Qty: 1 EACH | Refills: 3 | Status: SHIPPED | OUTPATIENT
Start: 2022-06-23

## 2022-06-23 RX ORDER — DIAZEPAM 5 MG/1
5 TABLET ORAL EVERY 8 HOURS PRN
Qty: 90 TABLET | Refills: 0 | Status: SHIPPED | OUTPATIENT
Start: 2022-06-23 | End: 2022-09-06 | Stop reason: SDUPTHER

## 2022-06-24 ASSESSMENT — ENCOUNTER SYMPTOMS
ABDOMINAL DISTENTION: 0
ABDOMINAL PAIN: 0
WHEEZING: 0
BACK PAIN: 1
SHORTNESS OF BREATH: 0
CHEST TIGHTNESS: 0
COUGH: 0

## 2022-06-24 NOTE — PROGRESS NOTES
are negative.     Past Medical History:   Diagnosis Date    Anxiety     Asthma     Autoimmune disease ([address]     Bipolar 1 disorder ([address]     Blood in urine     Bronchitis     Cataracts, bilateral     Degenerative cervical disc     Degenerative disc disease     Depression     Hypertension     Hypothyroidism     Interstitial cystitis     Iritis 2013    OU      Migraines     Osteopenia     PONV (postoperative nausea and vomiting)     Shortness of breath     Thyroid disease       Past Surgical History:   Procedure Laterality Date    BACK SURGERY      multiple injections and nerve blocks at cervical and lumbar    CERVICAL FUSION  11/09    c 4,5,6    CERVICAL FUSION  11/20/2015    CYSTOSCOPY N/A 4/25/2019    CYSTOSCOPY WITH HYDRODISTENTION WITH INSTILLATION OF DMSO AND MARCAINE performed by Karlos Muñoz MD at [address] [facility] 11/4/2021    CYSTOSCOPY HYDRODISTENTION WITH DMSO, BLADDER BOTOX 100 UNITS performed by Vena Duane, MD at [address] (W OR W/O BIOPSY)  2011    HC PAIN BLOCK Bilateral 11/6/2019    Third occipital nerve block bilateral. Left trigger point trapezius performed by Jess Salamanca MD at [facility] ([facility])  4/02    INCONTINENCE SURGERY      KNEE SURGERY      SPINAL CORD STIMULATOR IMPLANTATION N/A 11/20/2019    STAGE 1 / INSERTION OF INTERSTIM DEVICE performed by Karlos Muñoz MD at [facility] N/A 12/4/2019    STAGE II STIMULATOR INSERTION performed by Karlos Muñoz MD at [address] N/A 11/4/2021    INTERSTIM REMOVAL performed by Vena Duane, MD at [facility] History   Problem Relation Age of Onset    Rheum Arthritis Mother     Thyroid Disease Mother     Colon Polyps Mother         esophageal polyps    Other Mother     High Blood Pressure Father     Diabetes Father     High Blood Pressure Sister  Thyroid Disease Brother     High Blood Pressure Brother     Liver Cancer Maternal Uncle     Cancer Maternal Grandfather         pancreatic cancer    Colon Cancer Neg Hx      Social History     Tobacco Use    Smoking status: Former Smoker     Packs/day: 0.25     Years: 20.00     Pack years: 5.00     Types: Cigarettes     Start date: 28     Quit date: 2007     Years since quitting: 15.4    Smokeless tobacco: Never Used    Tobacco comment: 1 pack per week intermit for 20 yrs   Substance Use Topics    Alcohol use: No     Alcohol/week: 0.0 standard drinks      Current Outpatient Medications   Medication Sig Dispense Refill    albuterol sulfate HFA (PROAIR HFA) 108 (90 Base) MCG/ACT inhaler Inhale 2 puffs into the lungs every 6 hours as needed for Wheezing 1 each 3    diazePAM (VALIUM) 5 MG tablet Take 1 tablet by mouth every 8 hours as needed for Anxiety or Sleep (spasms). 90 tablet 0    cyclobenzaprine (FLEXERIL) 10 MG tablet Take 1 tablet by mouth nightly as needed for Muscle spasms 30 tablet 1    lamoTRIgine (LAMICTAL) 200 MG tablet Take 1 tablet by mouth daily 90 tablet 0    buPROPion (WELLBUTRIN XL) 150 MG extended release tablet Take 3 tablets by mouth every morning 270 tablet 0    levothyroxine (SYNTHROID) 88 MCG tablet Take 1 tablet by mouth daily 90 tablet 1    amitriptyline (ELAVIL) 10 mg tablet Take 1 tablet by mouth nightly 90 tablet 1    solifenacin (VESICARE) 10 MG tablet Take 1 tablet by mouth daily 30 tablet 3    polyethylene glycol (GLYCOLAX) 17 GM/SCOOP powder Take 17 g by mouth daily 1530 g 2    atenolol (TENORMIN) 25 MG tablet Take 1 tablet by mouth daily 90 tablet 3    Probiotic Product (PROBIOTIC PO) Take by mouth daily      montelukast (SINGULAIR) 10 MG tablet Take 1 tablet by mouth daily 30 tablet 5    cetirizine (ZYRTEC) 10 MG tablet Take 1 tablet by mouth daily 30 tablet 5     No current facility-administered medications for this visit.      Allergies   Allergen regular rhythm. Pulses: Normal pulses. Heart sounds: Normal heart sounds. Pulmonary:      Effort: Pulmonary effort is normal.      Breath sounds: Normal breath sounds. Abdominal:      General: Bowel sounds are normal.      Palpations: Abdomen is soft. Musculoskeletal:      Cervical back: Neck supple. Spasms and tenderness present. Pain with movement present. Decreased range of motion. Lumbar back: Spasms and tenderness present. Decreased range of motion. Negative right straight leg raise test and negative left straight leg raise test.        Back:    Skin:     General: Skin is warm and dry. Neurological:      General: No focal deficit present. Mental Status: She is alert and oriented to person, place, and time. Comments: Equal strength lower extremities noted. Equal hand grasps noted. Psychiatric:         Attention and Perception: Attention normal.         Mood and Affect: Mood is depressed. Speech: Speech is delayed. Behavior: Behavior is slowed. Behavior is cooperative. Thought Content: Thought content normal.         Cognition and Memory: Cognition normal.         Judgment: Judgment normal.       /60 (Site: Right Upper Arm)   Pulse 82   Temp 98.1 °F (36.7 °C) (Oral)   Resp 18   Wt 134 lb (60.8 kg)   SpO2 96%   BMI 26.17 kg/m²       Impression/Plan:  1. Hypothyroidism, unspecified type    2. Failed neck syndrome    3. Recurrent major depressive disorder, in partial remission ([address]    4. Mild intermittent reactive airway disease without complication    5. Essential hypertension    6. Severe episode of recurrent major depressive disorder, without psychotic features ([address]    7.  Bipolar disorder in partial remission, most recent episode unspecified type ([address]      Requested Prescriptions     Signed Prescriptions Disp Refills    albuterol sulfate HFA (PROAIR HFA) 108 (90 Base) MCG/ACT inhaler 1 each 3     Sig: Inhale 2 puffs into the lungs every 6 hours as needed for Wheezing    diazePAM (VALIUM) 5 MG tablet 90 tablet 0     Sig: Take 1 tablet by mouth every 8 hours as needed for Anxiety or Sleep (spasms).  cyclobenzaprine (FLEXERIL) 10 MG tablet 30 tablet 1     Sig: Take 1 tablet by mouth nightly as needed for Muscle spasms     No orders of the defined types were placed in this encounter. Patient giveneducational materials - see patient instructions. Discussed use, benefit, and side effects of prescribed medications. All patient questions answered. Pt voiced understanding. Reviewed health maintenance. Patient agreedwith treatment plan. Follow up as directed. Valium refilled 5 mg every 8 as needed anxiety #90. OARRS report reviewed. Flexeril refilled 10 mg 1 p.o. nightly  30 1 refill. Albuterol HFA 2 puffs every 6 as needed. Continue medications as prescribed. Educational information on above diagnosis  provided per AVS.  Continue routine consultation with urology and psychiatry.   Follow-up 3 months     30 min  Electronically signed by DEYANIRA De La Paz CNP on 6/24/2022 at 8:11 AM

## 2022-08-10 ENCOUNTER — HOSPITAL ENCOUNTER (OUTPATIENT)
Dept: CT IMAGING | Age: 57
Discharge: HOME | End: 2022-08-10
Payer: MEDICARE

## 2022-08-10 DIAGNOSIS — R31.0 GROSS HEMATURIA: ICD-10-CM

## 2022-08-10 PROCEDURE — 74178 CT ABD&PLV WO CNTR FLWD CNTR: CPT | Performed by: NURSE PRACTITIONER

## 2022-08-10 PROCEDURE — 6360000004 HC RX CONTRAST MEDICATION: Performed by: NURSE PRACTITIONER

## 2022-08-10 RX ADMIN — IOPAMIDOL 85 ML: 755 INJECTION, SOLUTION INTRAVENOUS at 14:47

## 2022-08-12 ENCOUNTER — OFFICE VISIT (OUTPATIENT)
Dept: UROLOGY | Age: 57
End: 2022-08-12
Payer: MEDICARE

## 2022-08-12 VITALS
BODY MASS INDEX: 26.5 KG/M2 | DIASTOLIC BLOOD PRESSURE: 80 MMHG | SYSTOLIC BLOOD PRESSURE: 118 MMHG | WEIGHT: 135 LBS | HEIGHT: 60 IN

## 2022-08-12 DIAGNOSIS — N32.81 OAB (OVERACTIVE BLADDER): Primary | ICD-10-CM

## 2022-08-12 DIAGNOSIS — N39.41 URGE INCONTINENCE: ICD-10-CM

## 2022-08-12 DIAGNOSIS — R31.29 MICROSCOPIC HEMATURIA: ICD-10-CM

## 2022-08-12 LAB
BACTERIA: ABNORMAL
BILIRUBIN URINE: ABNORMAL
BILIRUBIN URINE: NEGATIVE
BLOOD URINE, POC: ABNORMAL
BLOOD, URINE: ABNORMAL
CASTS: ABNORMAL /LPF
CASTS: ABNORMAL /LPF
CHARACTER, URINE: ABNORMAL
CHARACTER, URINE: CLEAR
COLOR, URINE: YELLOW
COLOR: ABNORMAL
CRYSTALS: ABNORMAL
EPITHELIAL CELLS, UA: ABNORMAL /HPF
GLUCOSE URINE: NEGATIVE MG/DL
GLUCOSE, URINE: NEGATIVE MG/DL
KETONES, URINE: ABNORMAL
KETONES, URINE: ABNORMAL
LEUKOCYTE CLUMPS, URINE: ABNORMAL
LEUKOCYTE ESTERASE, URINE: ABNORMAL
MISCELLANEOUS LAB TEST RESULT: ABNORMAL
MUCUS: ABNORMAL
NITRITE, URINE: NEGATIVE
NITRITE, URINE: NEGATIVE
PH UA: 6 (ref 5–9)
PH, URINE: 6 (ref 5–9)
POST VOID RESIDUAL (PVR): 25 ML
PROTEIN UA: 30 MG/DL
PROTEIN, URINE: 30 MG/DL
RBC URINE: ABNORMAL /HPF
RENAL EPITHELIAL, UA: ABNORMAL
SPECIFIC GRAVITY UA: 1.03 (ref 1–1.03)
SPECIFIC GRAVITY, URINE: 1.02 (ref 1–1.03)
UROBILINOGEN, URINE: 0.2 EU/DL (ref 0–1)
UROBILINOGEN, URINE: 0.2 EU/DL (ref 0–1)
WBC UA: ABNORMAL /HPF
YEAST: ABNORMAL

## 2022-08-12 PROCEDURE — 1036F TOBACCO NON-USER: CPT | Performed by: NURSE PRACTITIONER

## 2022-08-12 PROCEDURE — 99214 OFFICE O/P EST MOD 30 MIN: CPT | Performed by: NURSE PRACTITIONER

## 2022-08-12 PROCEDURE — 51798 US URINE CAPACITY MEASURE: CPT | Performed by: NURSE PRACTITIONER

## 2022-08-12 PROCEDURE — 81003 URINALYSIS AUTO W/O SCOPE: CPT | Performed by: NURSE PRACTITIONER

## 2022-08-12 PROCEDURE — G8427 DOCREV CUR MEDS BY ELIG CLIN: HCPCS | Performed by: NURSE PRACTITIONER

## 2022-08-12 PROCEDURE — G8419 CALC BMI OUT NRM PARAM NOF/U: HCPCS | Performed by: NURSE PRACTITIONER

## 2022-08-12 PROCEDURE — 3017F COLORECTAL CA SCREEN DOC REV: CPT | Performed by: NURSE PRACTITIONER

## 2022-08-12 RX ORDER — POLYETHYLENE GLYCOL 3350 17 G/17G
POWDER, FOR SOLUTION ORAL
Qty: 578 G | Refills: 3 | Status: SHIPPED | OUTPATIENT
Start: 2022-08-12

## 2022-08-12 RX ORDER — SOLIFENACIN SUCCINATE 10 MG/1
10 TABLET, FILM COATED ORAL DAILY
Qty: 30 TABLET | Refills: 3 | Status: CANCELLED | OUTPATIENT
Start: 2022-08-12 | End: 2023-08-12

## 2022-08-12 RX ORDER — POLYETHYLENE GLYCOL 3350 17 G/17G
POWDER, FOR SOLUTION ORAL
COMMUNITY
Start: 2022-08-11 | End: 2022-08-12 | Stop reason: SDUPTHER

## 2022-08-12 NOTE — PROGRESS NOTES
[address] [facility].  [address] [place] [address]  Dept: [phone]  Loc: [phone]    Visit Date: 8/12/2022        HPI:     Eusebio Jin is a 56 y.o. female who presents today for:  Chief Complaint   Patient presents with    Follow-up     CT urogram prior        HPI  Pt seen in follow up for IC and incontinence. Pt reports a long-standing hx of incontinence, microscopic hematuria, and IC. Reports she has been told the blood in the urine is from her IC. She has tried interstim and required explantation, hydrodistension with bladder botox injections, pelvic floor therapy, and multiple medications. Most recently underwent cystoscopy with hydrodistension, instillation of DMSO cocktail, instillation of bladder botox 100 units, and removal of interstim device on 11/22/21 by Dr. Agustin Bergeron. Notes Elavil 10 mg helps with the pelvic pain. Was unable to tolerate 25 mg dose secondary to sleepiness. Notes chronic brown discoloration of urine \"for years\" and foul odor of urine for a long time. Reports she doesn't feel like she has an infection. Was started on Vesicare at last appt and reports urinary symptoms improved but chronic constipation markedly worsened. Current Outpatient Medications   Medication Sig Dispense Refill    polyethylene glycol (GLYCOLAX) 17 GM/SCOOP powder MIX 17 GRAMS OF POWDER IN FLUID AND DRINK ONCE DAILY      albuterol sulfate HFA (PROAIR HFA) 108 (90 Base) MCG/ACT inhaler Inhale 2 puffs into the lungs every 6 hours as needed for Wheezing 1 each 3    diazePAM (VALIUM) 5 MG tablet Take 1 tablet by mouth every 8 hours as needed for Anxiety or Sleep (spasms).  90 tablet 0    cyclobenzaprine (FLEXERIL) 10 MG tablet Take 1 tablet by mouth nightly as needed for Muscle spasms 30 tablet 1    lamoTRIgine (LAMICTAL) 200 MG tablet Take 1 tablet by mouth daily 90 tablet 0    buPROPion (WELLBUTRIN XL) 150 MG extended release tablet Take 3 tablets by mouth every morning 270 tablet 0    levothyroxine (SYNTHROID) 88 MCG tablet Take 1 tablet by mouth daily 90 tablet 1    amitriptyline (ELAVIL) 10 mg tablet Take 1 tablet by mouth nightly 90 tablet 1    solifenacin (VESICARE) 10 MG tablet Take 1 tablet by mouth daily 30 tablet 3    atenolol (TENORMIN) 25 MG tablet Take 1 tablet by mouth daily 90 tablet 3    Probiotic Product (PROBIOTIC PO) Take by mouth daily      montelukast (SINGULAIR) 10 MG tablet Take 1 tablet by mouth daily 30 tablet 5    cetirizine (ZYRTEC) 10 MG tablet Take 1 tablet by mouth daily 30 tablet 5     No current facility-administered medications for this visit. Past Medical History  Zia Richter  has a past medical history of Anxiety, Asthma, Autoimmune disease ([address] Bipolar 1 disorder ([facility] [address] Blood in urine, Bronchitis, Cataracts, bilateral, Degenerative cervical disc, Degenerative disc disease, Depression, Hypertension, Hypothyroidism, Interstitial cystitis, Iritis, Migraines, Osteopenia, PONV (postoperative nausea and vomiting), Shortness of breath, and Thyroid disease. Past Surgical History  The patient  has a past surgical history that includes Hysterectomy (4/02); cervical fusion (11/09); Fulguration Minor Bladder Lesion (with o (2011); Incontinence surgery; Cystoscopy (N/A, 4/25/2019); back surgery; Pain Block (Bilateral, 11/6/2019); spinal cord stimulator implantation (N/A, 11/20/2019); cervical fusion (11/20/2015); spinal cord stimulator implantation (N/A, 12/4/2019); Cystoscopy (N/A, 11/4/2021); Stimulator Surgery (N/A, 11/4/2021); and knee surgery. Family History  This patient's family history includes Cancer in her maternal grandfather; Colon Polyps in her mother; Diabetes in her father; High Blood Pressure in her brother, father, and sister; Thereasa Edinger in her maternal uncle; Other in her mother; Rheum Arthritis in her mother; Thyroid Disease in her brother and mother.     Social History  Zia Richter reports that she quit smoking about 15 years ago. Her smoking use included cigarettes. She started smoking about 35 years ago. She has a 5.00 pack-year smoking history. She has never used smokeless tobacco. She reports that she does not drink alcohol and does not use drugs. Subjective:      Review of Systems  Constitutional: Negative for activity change, appetite change, chills, diaphoresis, fatigue, fever and unexpected weight change. Gastrointestinal: Negative for abdominal pain, nausea and vomiting. Genitourinary: Positive for frequency, hematuria and urgency. Negative for decreased urine volume, difficulty urinating, dysuria and flank pain. Pelvic pain   Musculoskeletal: Negative for back pain. Objective:   Height: 5' (1.524 m), Weight: 135 lb (61.2 kg), BP: 118/80       Physical Exam  Vitals reviewed. Constitutional:       General: She is not in acute distress. Appearance: Normal appearance. She is well-developed. She is not ill-appearing or diaphoretic. HENT:     Head: Normocephalic and atraumatic. Right Ear: External ear normal.      Left Ear: External ear normal.      Nose: Nose normal.      Mouth/Throat:      Mouth: Mucous membranes are moist.   Eyes:     General: No scleral icterus. Right eye: No discharge. Left eye: No discharge. Neck:     Vascular: No JVD. Trachea: No tracheal deviation. Pulmonary:     Effort: Pulmonary effort is normal. No respiratory distress. Abdominal:      General: There is no distension. Tenderness: There is abdominal tenderness (suprapubic). There is no right CVA tenderness or left CVA tenderness. Musculoskeletal:         General: No tenderness. Normal range of motion. Neurological:     Mental Status: She is alert and oriented to person, place, and time. Mental status is at baseline. Psychiatric:         Mood and Affect: Mood normal.         Behavior: Behavior normal.         Thought Content:  Thought content normal.       POC  Results for POC orders placed in visit on 08/12/22   POCT Urinalysis No Micro (Auto)   Result Value Ref Range    Glucose, Ur Negative NEGATIVE mg/dl    Bilirubin Urine Small (A)     Ketones, Urine Trace (A) NEGATIVE    Specific Gravity, Urine 1.020 1.002 - 1.030    Blood, UA POC Large (A) NEGATIVE    pH, Urine 6.00 5.0 - 9.0    Protein, Urine 30 (A) NEGATIVE mg/dl    Urobilinogen, Urine 0.20 0.0 - 1.0 eu/dl    Nitrite, Urine Negative NEGATIVE    Leukocyte Clumps, Urine Trace (A) NEGATIVE    Color, Urine Yellow YELLOW-STRAW    Character, Urine Clear CLR-SL.CLOUD   poct post void residual   Result Value Ref Range    post void residual 25 ml         Patients recent PSA values are as follows  No results found for: PSA, PSADIA     Recent BUN/Creatinine:  Lab Results   Component Value Date/Time    BUN 15 02/01/2022 09:38 AM    CREATININE 0.6 02/01/2022 09:38 AM   Radiology  The patient has had a CT Urogram which I have reviewed along with its accompanying report. The study demonstrates hypodense lesions in left kidney more apparent than on the prior exam still too small to characterize but likely cysts. Tiny hypodense lesion upper pole R kidney. Assessment:   OAB with mixed incontinence  IC  Gross hematuria and hx of micro hematuria  Left renal lesions  Tiny R upper pole probable cyst  Chronic constipation  Hepatomegaly  Plan:      Continue Elavil 10 mg nightly. Pt having worsening constipation on Vesicare. Stop Vesicare. Discussed trial of other medication therapy but pt wants to hold off at this time. Continue Miralax. Pt reports she has never had colonoscopy. Referral to GI regarding constipation, screening colonoscopy. Pt reports brown urine. Long-term persistent hematuria. Send urine for micro, culture, cytology. Check CMP. Reviewed CT urogram with Sasha Reddy. Small probable bilateral renal cysts. Can consider US in 1 year.        Next appt with Dr. Kameron Cantu as pt considering another cysto+hydrodistension+botox injection.

## 2022-08-13 LAB
ORGANISM: ABNORMAL
URINE CULTURE, ROUTINE: ABNORMAL

## 2022-08-15 ENCOUNTER — TELEPHONE (OUTPATIENT)
Dept: UROLOGY | Age: 57
End: 2022-08-15

## 2022-08-15 NOTE — TELEPHONE ENCOUNTER
Please let Tyler Councilman know her urine cytology was negative. Urine micro with microscopic blood but please also let her know there was no bilirubin in her urine. Recommend urine guidance panel.

## 2022-08-16 ENCOUNTER — HOSPITAL ENCOUNTER (OUTPATIENT)
Age: 57
Discharge: HOME | End: 2022-08-16
Payer: MEDICARE

## 2022-08-16 DIAGNOSIS — K59.09 CHRONIC CONSTIPATION: Primary | ICD-10-CM

## 2022-08-16 DIAGNOSIS — N39.41 URGE INCONTINENCE: ICD-10-CM

## 2022-08-16 DIAGNOSIS — Z12.11 ENCOUNTER FOR SCREENING COLONOSCOPY: ICD-10-CM

## 2022-08-16 DIAGNOSIS — N32.81 OAB (OVERACTIVE BLADDER): ICD-10-CM

## 2022-08-16 DIAGNOSIS — R31.29 MICROSCOPIC HEMATURIA: ICD-10-CM

## 2022-08-16 LAB
ALBUMIN SERPL-MCNC: 4.9 G/DL (ref 3.5–5.1)
ALP BLD-CCNC: 82 U/L (ref 38–126)
ALT SERPL-CCNC: 19 U/L (ref 11–66)
ANION GAP SERPL CALCULATED.3IONS-SCNC: 9 MEQ/L (ref 8–16)
AST SERPL-CCNC: 18 U/L (ref 5–40)
BILIRUB SERPL-MCNC: 0.2 MG/DL (ref 0.3–1.2)
BUN BLDV-MCNC: 17 MG/DL (ref 7–22)
CALCIUM SERPL-MCNC: 9.8 MG/DL (ref 8.5–10.5)
CHLORIDE BLD-SCNC: 108 MEQ/L (ref 98–111)
CO2: 25 MEQ/L (ref 23–33)
CREAT SERPL-MCNC: 0.6 MG/DL (ref 0.4–1.2)
GFR SERPL CREATININE-BSD FRML MDRD: > 90 ML/MIN/1.73M2
GLUCOSE BLD-MCNC: 94 MG/DL (ref 70–108)
POTASSIUM SERPL-SCNC: 4.3 MEQ/L (ref 3.5–5.2)
SODIUM BLD-SCNC: 142 MEQ/L (ref 135–145)
TOTAL PROTEIN: 6.9 G/DL (ref 6.1–8)

## 2022-08-16 PROCEDURE — 80053 COMPREHEN METABOLIC PANEL: CPT

## 2022-08-16 PROCEDURE — 36415 COLL VENOUS BLD VENIPUNCTURE: CPT

## 2022-08-17 ENCOUNTER — NURSE ONLY (OUTPATIENT)
Dept: UROLOGY | Age: 57
End: 2022-08-17

## 2022-08-17 DIAGNOSIS — R31.29 MICROSCOPIC HEMATURIA: Primary | ICD-10-CM

## 2022-08-17 PROCEDURE — 99999 PR OFFICE/OUTPT VISIT,PROCEDURE ONLY: CPT | Performed by: UROLOGY

## 2022-08-23 ENCOUNTER — TELEPHONE (OUTPATIENT)
Dept: UROLOGY | Age: 57
End: 2022-08-23

## 2022-08-23 NOTE — TELEPHONE ENCOUNTER
Patient calling in for her results of blood work and urine from last week. She said the urine was done at the office and the blood work at [facility]. Please advise.

## 2022-08-24 RX ORDER — METRONIDAZOLE 500 MG/1
500 TABLET ORAL 2 TIMES DAILY
Qty: 14 TABLET | Refills: 0 | Status: SHIPPED | OUTPATIENT
Start: 2022-08-24 | End: 2022-08-31

## 2022-08-24 RX ORDER — NITROFURANTOIN 25; 75 MG/1; MG/1
100 CAPSULE ORAL 2 TIMES DAILY
Qty: 28 CAPSULE | Refills: 0 | Status: SHIPPED | OUTPATIENT
Start: 2022-08-24 | End: 2022-09-07

## 2022-08-24 NOTE — TELEPHONE ENCOUNTER
Start Macrobid 100 mg PO BID for 14 days and flagyl 500 mg PO BID for 7 days--do not drink alcohol while taking the flagyl.

## 2022-08-24 NOTE — TELEPHONE ENCOUNTER
Patient advised antibiotics were sent to the pharmacy. She voiced understanding not to drink alcohol while taking flagyl.

## 2022-08-24 NOTE — TELEPHONE ENCOUNTER
Patient stated she has pain in the bladder and middle lower back. She does have a history of herniated disc. The pain is mostly in the front lower abdomen. Pain rated 5 on scale of 0-10 and feels sore and achy in that area. She feels like it is an UTI. The urine is strong smelling and dark. The urgency and frequency has worsen and has more incontinence. She denies burning,fever, or chills. Patient aware of lab results. Please advise. Thank you.

## 2022-08-24 NOTE — TELEPHONE ENCOUNTER
Pt's urine guidance test resulted with 5 organisms consistent with mixed growth. This may indicate possible contamination of the sample with skin varinder. Is she having any symptoms of UTI? CMP blood work within normal limits. PAIN

## 2022-09-06 DIAGNOSIS — M96.1 FAILED NECK SYNDROME: ICD-10-CM

## 2022-09-06 DIAGNOSIS — F33.41 RECURRENT MAJOR DEPRESSIVE DISORDER, IN PARTIAL REMISSION (HCC): ICD-10-CM

## 2022-09-06 RX ORDER — DIAZEPAM 5 MG/1
5 TABLET ORAL EVERY 8 HOURS PRN
Qty: 90 TABLET | Refills: 0 | Status: SHIPPED | OUTPATIENT
Start: 2022-09-06 | End: 2023-09-06

## 2022-09-06 NOTE — TELEPHONE ENCOUNTER
----- Message from Patricia Rosado sent at 9/6/2022  8:59 AM EDT -----  Subject: Refill Request    QUESTIONS  Name of Medication? diazePAM (VALIUM) 5 MG tablet  Patient-reported dosage and instructions? as needed  How many days do you have left? 0  Preferred Pharmacy? [facility]  Pharmacy phone number (if available)? [phone]  ---------------------------------------------------------------------------  --------------  CALL BACK INFO  What is the best way for the office to contact you? OK to leave message on   voicemail  Preferred Call Back Phone Number? [phone]  ---------------------------------------------------------------------------  --------------  SCRIPT ANSWERS  Relationship to Patient?  Self

## 2022-09-08 ENCOUNTER — OFFICE VISIT (OUTPATIENT)
Dept: UROLOGY | Age: 57
End: 2022-09-08
Payer: MEDICARE

## 2022-09-08 VITALS
DIASTOLIC BLOOD PRESSURE: 78 MMHG | WEIGHT: 137 LBS | HEIGHT: 60 IN | BODY MASS INDEX: 26.9 KG/M2 | SYSTOLIC BLOOD PRESSURE: 128 MMHG

## 2022-09-08 DIAGNOSIS — R10.2 PELVIC PAIN: ICD-10-CM

## 2022-09-08 DIAGNOSIS — N39.46 MIXED STRESS AND URGE URINARY INCONTINENCE: Primary | ICD-10-CM

## 2022-09-08 LAB
BILIRUBIN URINE: NEGATIVE
BLOOD URINE, POC: ABNORMAL
CHARACTER, URINE: CLEAR
COLOR, URINE: YELLOW
GLUCOSE URINE: NEGATIVE MG/DL
KETONES, URINE: NEGATIVE
LEUKOCYTE CLUMPS, URINE: ABNORMAL
NITRITE, URINE: NEGATIVE
PH, URINE: 5 (ref 5–9)
PROTEIN, URINE: NEGATIVE MG/DL
SPECIFIC GRAVITY, URINE: <= 1.005 (ref 1–1.03)
UROBILINOGEN, URINE: 0.2 EU/DL (ref 0–1)

## 2022-09-08 PROCEDURE — G8427 DOCREV CUR MEDS BY ELIG CLIN: HCPCS | Performed by: UROLOGY

## 2022-09-08 PROCEDURE — G8419 CALC BMI OUT NRM PARAM NOF/U: HCPCS | Performed by: UROLOGY

## 2022-09-08 PROCEDURE — 81003 URINALYSIS AUTO W/O SCOPE: CPT | Performed by: UROLOGY

## 2022-09-08 PROCEDURE — 99214 OFFICE O/P EST MOD 30 MIN: CPT | Performed by: UROLOGY

## 2022-09-08 PROCEDURE — 1036F TOBACCO NON-USER: CPT | Performed by: UROLOGY

## 2022-09-08 PROCEDURE — 3017F COLORECTAL CA SCREEN DOC REV: CPT | Performed by: UROLOGY

## 2022-09-08 NOTE — PROGRESS NOTES
Tom [address] [place] [phone]  Dept: [phone]  Dept Fax: [phone]  Loc: [address] Urology Office Note -     Patient:  Bernarda King  YOB: 1965    The patient is a 56 y.o. female who presents today for evaluation of the following problems:   Chief Complaint   Patient presents with    Follow-up    Incontinence     mixed        History of Present Illness:    stress incontinence- stable. Hx of midurethral sling in past  oab- failed botox and interstim. interstim has been explanted. Having constipation with vesicare so this was recently discontinued  Pelvic pain/interstitial cystitis- occasional flank pain and lower abdominal pain. not improved with meds/hydrodistension with DMSO. Offered repeat intervention but pt would like to hold off at this time.          Requested/reviewed records from DEYANIRA Perkins CNP office and/or outside physician/EMR    (Patient's old records have been requested, reviewed and pertinent findings summarized in today's note.)    Procedures Today: N/A    Last several PSA's:  No results found for: PSA    Last total testosterone:  No results found for: TESTOSTERONE    Urinalysis today:  Results for POC orders placed in visit on 09/08/22   POCT Urinalysis No Micro (Auto)   Result Value Ref Range    Glucose, Ur Negative NEGATIVE mg/dl    Bilirubin Urine Negative     Ketones, Urine Negative NEGATIVE    Specific Gravity, Urine <= 1.005 1.002 - 1.030    Blood, UA POC Moderate (A) NEGATIVE    pH, Urine 5.00 5.0 - 9.0    Protein, Urine Negative NEGATIVE mg/dl    Urobilinogen, Urine 0.20 0.0 - 1.0 eu/dl    Nitrite, Urine Negative NEGATIVE    Leukocyte Clumps, Urine Trace (A) NEGATIVE    Color, Urine Yellow YELLOW-STRAW    Character, Urine Clear CLR-SL.CLOUD       Last BUN and creatinine:  Lab Results   Component Value Date    BUN 17 08/16/2022     Lab Results   Component Value Date    CREATININE 0.6 08/16/2022       Imaging Reviewed during this Office Visit:   Roxann Cole MD independently reviewed the images and verified the radiology reports from:    No results found.     PAST MEDICAL, FAMILY AND SOCIAL HISTORY:  Past Medical History:   Diagnosis Date    Anxiety     Asthma     Autoimmune disease ([facility] [address]     Bipolar 1 disorder ([facility] [address]     Blood in urine     Bronchitis     Cataracts, bilateral     Degenerative cervical disc     Degenerative disc disease     Depression     Hypertension     Hypothyroidism     Interstitial cystitis     Iritis 2013    OU      Migraines     Osteopenia     PONV (postoperative nausea and vomiting)     Shortness of breath     Thyroid disease      Past Surgical History:   Procedure Laterality Date    BACK SURGERY      multiple injections and nerve blocks at cervical and lumbar    CERVICAL FUSION  11/09    c 4,5,6    CERVICAL FUSION  11/20/2015    CYSTOSCOPY N/A 4/25/2019    CYSTOSCOPY WITH HYDRODISTENTION WITH INSTILLATION OF DMSO AND MARCAINE performed by Osito Tolentino MD at [facility] 19 N/A 11/4/2021    CYSTOSCOPY HYDRODISTENTION WITH DMSO, BLADDER BOTOX 100 UNITS performed by Bisi Pollard MD at [address] (W OR W/O BIOPSY)  2011    HC PAIN BLOCK Bilateral 11/6/2019    Third occipital nerve block bilateral. Left trigger point trapezius performed by Terrie Forbes MD at [facility] ([facility])  4/02    INCONTINENCE SURGERY      KNEE SURGERY      SPINAL CORD STIMULATOR IMPLANTATION N/A 11/20/2019    STAGE 1 / INSERTION OF INTERSTIM DEVICE performed by Osito Tolentino MD at [facility] 12/4/2019    STAGE II STIMULATOR INSERTION performed by Osito Tolentino MD at [facility] 91 N/A 11/4/2021    INTERSTIM REMOVAL performed by Bisi Pollard MD at [facility] History   Problem Relation Age of Onset    Rheum Arthritis Mother     Thyroid Disease Mother     Colon Polyps Mother         esophageal polyps    Other Mother     High Blood Pressure Father     Diabetes Father     High Blood Pressure Sister     Thyroid Disease Brother     High Blood Pressure Brother     Liver Cancer Maternal Uncle     Cancer Maternal Grandfather         pancreatic cancer    Colon Cancer Neg Hx      No outpatient medications have been marked as taking for the 9/8/22 encounter (Office Visit) with Adán Dalton MD.       Nurtec [rimegepant sulfate], Abilify [aripiprazole], Cephalexin, Codeine, Contrast [iodides], Excedrin migraine [aspirin-acetaminophen-caffeine], Neurontin [gabapentin], Marguerite Ditty [tapentadol hcl er], Percocet [oxycodone-acetaminophen], Tramadol, Tylenol with codeine #3 [acetaminophen-codeine], and Vicodin [hydrocodone-acetaminophen]  Social History     Tobacco Use   Smoking Status Former    Packs/day: 0.25    Years: 20.00    Pack years: 5.00    Types: Cigarettes    Start date: 28    Quit date: 2007    Years since quitting: 15.7   Smokeless Tobacco Never   Tobacco Comments    1 pack per week intermit for 20 yrs      (If patient a smoker, smoking cessation counseling offered)   Social History     Substance and Sexual Activity   Alcohol Use No    Alcohol/week: 0.0 standard drinks       REVIEW OF SYSTEMS:  Constitutional: negative  Eyes: negative  Respiratory: negative  Cardiovascular: negative  Gastrointestinal: negative  Genitourinary: see HPI  Musculoskeletal: negative  Skin: negative   Neurological: negative  Hematological/Lymphatic: negative  Psychological: negative        Physical Exam:    This a 56 y.o. female  Vitals:    09/08/22 1419   BP: 128/78     Body mass index is 26.76 kg/m². Constitutional: Patient in no acute distress;       Assessment and Plan        1. Mixed stress and urge urinary incontinence    2. Pelvic pain               Plan:        stress incontinence- stable.  Hx of midurethral sling in past  oab- failed botox and interstim. interstim has been explanted. Having constipation with vesicare so this was recently discontinued  Pelvic pain/interstitial cystitis- not improved with meds/hydrodistension with DMSO. Offered repeat intervention but pt would like to hold off at this time. Reviewed ct scan- small cysts hard to characterize  Pt would like to hold off intervention/surgery. Will reassess in six months        Prescriptions Ordered:  No orders of the defined types were placed in this encounter.      Orders Placed:  Orders Placed This Encounter   Procedures    POCT Urinalysis No Micro (Auto)            MILKA Sepulveda MD

## 2022-09-19 ENCOUNTER — TELEPHONE (OUTPATIENT)
Dept: FAMILY MEDICINE CLINIC | Age: 57
End: 2022-09-19

## 2022-09-19 DIAGNOSIS — E03.9 HYPOTHYROIDISM, UNSPECIFIED TYPE: Primary | ICD-10-CM

## 2022-09-19 DIAGNOSIS — R53.83 OTHER FATIGUE: ICD-10-CM

## 2022-09-19 DIAGNOSIS — I10 ESSENTIAL HYPERTENSION: ICD-10-CM

## 2022-09-26 ENCOUNTER — HOSPITAL ENCOUNTER (OUTPATIENT)
Age: 57
Discharge: HOME | End: 2022-09-26
Payer: MEDICARE

## 2022-09-26 ENCOUNTER — TELEPHONE (OUTPATIENT)
Dept: FAMILY MEDICINE CLINIC | Age: 57
End: 2022-09-26

## 2022-09-26 DIAGNOSIS — R53.83 OTHER FATIGUE: ICD-10-CM

## 2022-09-26 DIAGNOSIS — I10 ESSENTIAL HYPERTENSION: ICD-10-CM

## 2022-09-26 DIAGNOSIS — E03.9 HYPOTHYROIDISM, UNSPECIFIED TYPE: ICD-10-CM

## 2022-09-26 LAB
ANION GAP SERPL CALCULATED.3IONS-SCNC: 13 MEQ/L (ref 8–16)
BASOPHILS # BLD: 0.6 %
BASOPHILS ABSOLUTE: 0 THOU/MM3 (ref 0–0.1)
BUN BLDV-MCNC: 12 MG/DL (ref 7–22)
CALCIUM SERPL-MCNC: 10.1 MG/DL (ref 8.5–10.5)
CHLORIDE BLD-SCNC: 106 MEQ/L (ref 98–111)
CO2: 23 MEQ/L (ref 23–33)
CREAT SERPL-MCNC: 0.7 MG/DL (ref 0.4–1.2)
EOSINOPHIL # BLD: 3.1 %
EOSINOPHILS ABSOLUTE: 0.2 THOU/MM3 (ref 0–0.4)
ERYTHROCYTE [DISTWIDTH] IN BLOOD BY AUTOMATED COUNT: 12.4 % (ref 11.5–14.5)
ERYTHROCYTE [DISTWIDTH] IN BLOOD BY AUTOMATED COUNT: 41.5 FL (ref 35–45)
GFR SERPL CREATININE-BSD FRML MDRD: 87 ML/MIN/1.73M2
GLUCOSE BLD-MCNC: 90 MG/DL (ref 70–108)
HCT VFR BLD CALC: 42.9 % (ref 37–47)
HEMOGLOBIN: 14.1 GM/DL (ref 12–16)
IMMATURE GRANS (ABS): 0.02 THOU/MM3 (ref 0–0.07)
IMMATURE GRANULOCYTES: 0.4 %
LYMPHOCYTES # BLD: 29.8 %
LYMPHOCYTES ABSOLUTE: 1.6 THOU/MM3 (ref 1–4.8)
MCH RBC QN AUTO: 30.3 PG (ref 26–33)
MCHC RBC AUTO-ENTMCNC: 32.9 GM/DL (ref 32.2–35.5)
MCV RBC AUTO: 92.3 FL (ref 81–99)
MONOCYTES # BLD: 8.1 %
MONOCYTES ABSOLUTE: 0.4 THOU/MM3 (ref 0.4–1.3)
NUCLEATED RED BLOOD CELLS: 0 /100 WBC
PLATELET # BLD: 310 THOU/MM3 (ref 130–400)
PMV BLD AUTO: 10.8 FL (ref 9.4–12.4)
POTASSIUM SERPL-SCNC: 5.6 MEQ/L (ref 3.5–5.2)
RBC # BLD: 4.65 MILL/MM3 (ref 4.2–5.4)
SEG NEUTROPHILS: 58 %
SEGMENTED NEUTROPHILS ABSOLUTE COUNT: 3.1 THOU/MM3 (ref 1.8–7.7)
SODIUM BLD-SCNC: 142 MEQ/L (ref 135–145)
T4 FREE: 1.32 NG/DL (ref 0.93–1.76)
TSH SERPL DL<=0.05 MIU/L-ACNC: 0.54 UIU/ML (ref 0.4–4.2)
WBC # BLD: 5.4 THOU/MM3 (ref 4.8–10.8)

## 2022-09-26 PROCEDURE — 85025 COMPLETE CBC W/AUTO DIFF WBC: CPT

## 2022-09-26 PROCEDURE — 84443 ASSAY THYROID STIM HORMONE: CPT

## 2022-09-26 PROCEDURE — 36415 COLL VENOUS BLD VENIPUNCTURE: CPT

## 2022-09-26 PROCEDURE — 80048 BASIC METABOLIC PNL TOTAL CA: CPT

## 2022-09-26 PROCEDURE — 84439 ASSAY OF FREE THYROXINE: CPT

## 2022-09-26 RX ORDER — BUPROPION HYDROCHLORIDE 150 MG/1
450 TABLET ORAL EVERY MORNING
Qty: 270 TABLET | Refills: 0 | Status: SHIPPED | OUTPATIENT
Start: 2022-09-26

## 2022-09-26 RX ORDER — LAMOTRIGINE 200 MG/1
200 TABLET ORAL DAILY
Qty: 90 TABLET | Refills: 0 | Status: SHIPPED | OUTPATIENT
Start: 2022-09-26

## 2022-09-26 NOTE — TELEPHONE ENCOUNTER
Pascual Patel called requesting a refill on the following medications:  Requested Prescriptions     Pending Prescriptions Disp Refills    buPROPion (WELLBUTRIN XL) 150 MG extended release tablet 270 tablet 0     Sig: Take 3 tablets by mouth every morning    lamoTRIgine (LAMICTAL) 200 MG tablet 90 tablet 0     Sig: Take 1 tablet by mouth daily     She has enough medication to get her through this week.    Date of last visit: 6/22/2022  Date of next visit (if applicable):10/5/2022  [facility]

## 2022-10-05 ENCOUNTER — TELEPHONE (OUTPATIENT)
Dept: PSYCHOLOGY | Age: 57
End: 2022-10-05

## 2022-10-13 ENCOUNTER — TELEPHONE (OUTPATIENT)
Dept: FAMILY MEDICINE CLINIC | Age: 57
End: 2022-10-13

## 2022-10-13 DIAGNOSIS — Z11.52 ENCOUNTER FOR SCREENING FOR COVID-19: Primary | ICD-10-CM

## 2022-10-13 NOTE — TELEPHONE ENCOUNTER
Patient is going to [facility] next Mike 10/23 and needs a covid PCR test. Ok per verbal order from Igor Holt. Lab ordered for STR.

## 2022-10-22 ENCOUNTER — NURSE ONLY (OUTPATIENT)
Dept: LAB | Age: 57
End: 2022-10-22

## 2022-10-22 ENCOUNTER — HOSPITAL ENCOUNTER (OUTPATIENT)
Age: 57
Discharge: HOME | End: 2022-10-22
Payer: MEDICARE

## 2022-10-22 LAB
INFLUENZA A: NOT DETECTED
INFLUENZA B: NOT DETECTED
SARS-COV-2 RNA, RT PCR: NOT DETECTED

## 2022-10-22 PROCEDURE — 87636 SARSCOV2 & INF A&B AMP PRB: CPT

## 2022-11-21 ENCOUNTER — TELEPHONE (OUTPATIENT)
Dept: FAMILY MEDICINE CLINIC | Age: 57
End: 2022-11-21

## 2022-11-21 DIAGNOSIS — M96.1 FAILED NECK SYNDROME: ICD-10-CM

## 2022-11-21 DIAGNOSIS — F33.41 RECURRENT MAJOR DEPRESSIVE DISORDER, IN PARTIAL REMISSION (HCC): ICD-10-CM

## 2022-11-21 RX ORDER — DIAZEPAM 5 MG/1
5 TABLET ORAL EVERY 8 HOURS PRN
Qty: 90 TABLET | Refills: 0 | OUTPATIENT
Start: 2022-11-21 | End: 2023-11-21

## 2022-11-21 NOTE — TELEPHONE ENCOUNTER
----- Message from Ladene Opitz, MA sent at 11/21/2022 11:54 AM EST -----  Subject: Refill Request    QUESTIONS  Name of Medication? diazePAM (VALIUM) 5 MG tablet  Patient-reported dosage and instructions? Take 1 tablet by mouth every 8   hours as needed   How many days do you have left? 4  Preferred Pharmacy? [facility]  Pharmacy phone number (if available)? [phone]  ---------------------------------------------------------------------------  --------------  CALL BACK INFO  What is the best way for the office to contact you? OK to leave message on   voicemail  Preferred Call Back Phone Number? [phone]  ---------------------------------------------------------------------------  --------------  SCRIPT ANSWERS  Relationship to Patient?  Self

## 2022-11-23 ENCOUNTER — TELEMEDICINE (OUTPATIENT)
Dept: PSYCHIATRY | Age: 57
End: 2022-11-23
Payer: MEDICARE

## 2022-11-23 DIAGNOSIS — F31.81 BIPOLAR II DISORDER (HCC): Primary | ICD-10-CM

## 2022-11-23 DIAGNOSIS — F41.1 GAD (GENERALIZED ANXIETY DISORDER): ICD-10-CM

## 2022-11-23 PROCEDURE — G8484 FLU IMMUNIZE NO ADMIN: HCPCS | Performed by: NURSE PRACTITIONER

## 2022-11-23 PROCEDURE — 3017F COLORECTAL CA SCREEN DOC REV: CPT | Performed by: NURSE PRACTITIONER

## 2022-11-23 PROCEDURE — G8428 CUR MEDS NOT DOCUMENT: HCPCS | Performed by: NURSE PRACTITIONER

## 2022-11-23 PROCEDURE — G8419 CALC BMI OUT NRM PARAM NOF/U: HCPCS | Performed by: NURSE PRACTITIONER

## 2022-11-23 PROCEDURE — 1036F TOBACCO NON-USER: CPT | Performed by: NURSE PRACTITIONER

## 2022-11-23 PROCEDURE — 99214 OFFICE O/P EST MOD 30 MIN: CPT | Performed by: NURSE PRACTITIONER

## 2022-11-23 RX ORDER — BUPROPION HYDROCHLORIDE 150 MG/1
450 TABLET ORAL EVERY MORNING
Qty: 270 TABLET | Refills: 0 | Status: SHIPPED | OUTPATIENT
Start: 2022-11-23

## 2022-11-23 RX ORDER — LAMOTRIGINE 200 MG/1
200 TABLET ORAL DAILY
Qty: 90 TABLET | Refills: 0 | Status: SHIPPED | OUTPATIENT
Start: 2022-11-23

## 2022-11-23 NOTE — PROGRESS NOTES
Mikael Pillai, was evaluated through a synchronous (real-time) audio-video encounter. The patient (or guardian if applicable) is aware that this is a billable service, which includes applicable co-pays. This Virtual Visit was conducted with patient's (and/or legal guardian's) consent. The visit was conducted pursuant to the emergency declaration under the [facility], [address] [place] [address] authority and the [employer] and [employer] General Act. Patient identification was verified, and a caregiver was present when appropriate. The patient was located at [facility]  Provider was located at [facility]       Total time spent for this encounter: BHUMI    --DEYANIRA Corcoran CNP on 11/23/2022 at 5:57 PM    An electronic signature was used to authenticate this note. Progress Note  11-                                                                          CC: Bipolar II D/O;  ZOE     HPI  Cr Vital is seen virtually for follow up and  medication management. States her meds continue to work well for her. Denies having  side effects. .Denies having a rash. Good med compliance is still  reported. Denies having depression or anxiety. Denies feelings of harm towards self or others. Reports still  dealing with  pain and feels this is still her main issue. Reports being in [address] Sept 2022 and reports this didn't help her pain. Reports has not seen Dr Felisha Dickens for counseling but has appt set up. Reports appetite and sleep are better. States she still spends time with her grandchildren. States she went on a mission trip with her [employer]. Recently. Labs CBC, TSH, done 9-26-22 and CMP done 8-  No critical abnormals noted. Reports still  being on Valium Rxed by PCP for anxiety and sleep. Client advised again Valium would not be Rxed from this office. Past Medical Hx:  Reports allergies to Tramadol.  Codeine, Percocets, Vicodin  Reports having Hypothyroidism, Migraine Headaches, HTN. DDD, Asthma, Osteoarthritis  Reports partial hysterectomy 2001     Past Psychiatric Hx:  Denies any past psych hospitalizations. Denies any past suicidal gestures. Denies ever being under care of psychiatrist. Reports receives [facility] at [facility]. Currently xanax Rxed by PCP and Valium Rxed by PCP. 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 MARITAL STATUS: Currently  2nd marriage. Reports relationship is good. OCCUPATION: Currently on Disability. Last worked at [employer] left there 2015  [address]: Reports having associates degree in [employer]. LIVING SITUATION: [place] Has 2 grown children. LEGAL:  Reports past 2 DUI's. Last was 1994     MSE:  Level of consciousness: Alert  Appearance:  fair grooming   Behavior/Motor: no abnormalities noted   Attitude toward examiner: cooperative   Speech: Normal volume, goal directed, NRR  Mood: Euthymic  Affect: Reactive  Thought processes: Linear and goal directed   Suicidal Ideation: Denies suicidal ideations  Homicidal ideation: Denies homicidal ideations  Delusions: No evidence of delusions is observed  Perceptual Disturbance: Denies AH/VH;  No evidence of psychosis is observed. Cognition: Oriented to person, place, time and situation   Concentration fair   Memory intact   Insight: Fair  Judgment: Fair     ROS:  Constitutional: Negative for fever, chills and fatigue. HENT: Negative for ear pain, congestion, rhinorrhea and neck pain. Eyes: Negative for pain and discharge. Respiratory: Negative for cough, chest tightness and wheezing. Repots being on Advair for asthma  Cardiovascular: Negative for chest pain, palpitations and leg swelling.    Gastrointestinal:

## 2022-11-29 ENCOUNTER — OFFICE VISIT (OUTPATIENT)
Dept: FAMILY MEDICINE CLINIC | Age: 57
End: 2022-11-29
Payer: MEDICARE

## 2022-11-29 VITALS
TEMPERATURE: 97.8 F | DIASTOLIC BLOOD PRESSURE: 80 MMHG | WEIGHT: 137 LBS | SYSTOLIC BLOOD PRESSURE: 128 MMHG | BODY MASS INDEX: 26.76 KG/M2 | HEART RATE: 76 BPM | OXYGEN SATURATION: 99 % | RESPIRATION RATE: 16 BRPM

## 2022-11-29 DIAGNOSIS — M96.1 FAILED NECK SYNDROME: ICD-10-CM

## 2022-11-29 DIAGNOSIS — F33.41 RECURRENT MAJOR DEPRESSIVE DISORDER, IN PARTIAL REMISSION (HCC): ICD-10-CM

## 2022-11-29 DIAGNOSIS — E03.9 HYPOTHYROIDISM, UNSPECIFIED TYPE: ICD-10-CM

## 2022-11-29 PROCEDURE — G8419 CALC BMI OUT NRM PARAM NOF/U: HCPCS | Performed by: NURSE PRACTITIONER

## 2022-11-29 PROCEDURE — 3074F SYST BP LT 130 MM HG: CPT | Performed by: NURSE PRACTITIONER

## 2022-11-29 PROCEDURE — 3078F DIAST BP <80 MM HG: CPT | Performed by: NURSE PRACTITIONER

## 2022-11-29 PROCEDURE — 99213 OFFICE O/P EST LOW 20 MIN: CPT | Performed by: NURSE PRACTITIONER

## 2022-11-29 PROCEDURE — 1036F TOBACCO NON-USER: CPT | Performed by: NURSE PRACTITIONER

## 2022-11-29 PROCEDURE — 3017F COLORECTAL CA SCREEN DOC REV: CPT | Performed by: NURSE PRACTITIONER

## 2022-11-29 PROCEDURE — G8484 FLU IMMUNIZE NO ADMIN: HCPCS | Performed by: NURSE PRACTITIONER

## 2022-11-29 PROCEDURE — G8427 DOCREV CUR MEDS BY ELIG CLIN: HCPCS | Performed by: NURSE PRACTITIONER

## 2022-11-29 RX ORDER — AMITRIPTYLINE HYDROCHLORIDE 10 MG/1
10 TABLET, FILM COATED ORAL NIGHTLY
Qty: 90 TABLET | Refills: 1 | Status: SHIPPED | OUTPATIENT
Start: 2022-11-29

## 2022-11-29 RX ORDER — CYCLOBENZAPRINE HCL 10 MG
10 TABLET ORAL NIGHTLY PRN
Qty: 30 TABLET | Refills: 1 | Status: SHIPPED | OUTPATIENT
Start: 2022-11-29

## 2022-11-29 RX ORDER — DIAZEPAM 5 MG/1
5 TABLET ORAL EVERY 8 HOURS PRN
Qty: 90 TABLET | Refills: 0 | Status: SHIPPED | OUTPATIENT
Start: 2022-11-29 | End: 2023-11-29

## 2022-11-29 RX ORDER — LEVOTHYROXINE SODIUM 88 UG/1
88 TABLET ORAL DAILY
Qty: 90 TABLET | Refills: 1 | Status: SHIPPED | OUTPATIENT
Start: 2022-11-29

## 2022-11-29 SDOH — ECONOMIC STABILITY: FOOD INSECURITY: WITHIN THE PAST 12 MONTHS, THE FOOD YOU BOUGHT JUST DIDN'T LAST AND YOU DIDN'T HAVE MONEY TO GET MORE.: NEVER TRUE

## 2022-11-29 SDOH — ECONOMIC STABILITY: FOOD INSECURITY: WITHIN THE PAST 12 MONTHS, YOU WORRIED THAT YOUR FOOD WOULD RUN OUT BEFORE YOU GOT MONEY TO BUY MORE.: NEVER TRUE

## 2022-11-29 ASSESSMENT — ENCOUNTER SYMPTOMS
ABDOMINAL PAIN: 0
TROUBLE SWALLOWING: 0
SORE THROAT: 0
DIARRHEA: 0
COUGH: 0
ALLERGIC/IMMUNOLOGIC NEGATIVE: 1
RHINORRHEA: 0
WHEEZING: 0
CONSTIPATION: 0
EYE PAIN: 0
BACK PAIN: 0
SHORTNESS OF BREATH: 0
VOMITING: 0
EYE REDNESS: 0
EYE DISCHARGE: 0
NAUSEA: 0

## 2022-11-29 ASSESSMENT — SOCIAL DETERMINANTS OF HEALTH (SDOH): HOW HARD IS IT FOR YOU TO PAY FOR THE VERY BASICS LIKE FOOD, HOUSING, MEDICAL CARE, AND HEATING?: NOT HARD AT ALL

## 2022-11-29 NOTE — PROGRESS NOTES
[address] [facility]  [address] [facility] [address]  Dept: [phone]  Dept Fax: [phone]  Loc: [phone]     Visit Date:  11/29/2022      Patient:  William Nash  YOB: 1965    HPI:     Chief Complaint   Patient presents with    Fatigue     Chills, headache since yesterday. 3 Month Follow-Up       Patient here for 3-month regular periodic follow-up and refills on anxiety management and treatment with benzodiazepines. Patient states current dosing is adequate for the control desired without indication of side effect such as sedation, lethargy, addiction or more anxiety. Fatigue  Associated symptoms include fatigue. Pertinent negatives include no abdominal pain, arthralgias, congestion, coughing, fever, headaches, myalgias, nausea, rash, sore throat, vomiting or weakness. Medications    Current Outpatient Medications:     diazePAM (VALIUM) 5 MG tablet, Take 1 tablet by mouth every 8 hours as needed for Anxiety or Sleep (spasms). , Disp: 90 tablet, Rfl: 0    levothyroxine (SYNTHROID) 88 MCG tablet, Take 1 tablet by mouth daily, Disp: 90 tablet, Rfl: 1    cyclobenzaprine (FLEXERIL) 10 MG tablet, Take 1 tablet by mouth nightly as needed for Muscle spasms, Disp: 30 tablet, Rfl: 1    amitriptyline (ELAVIL) 10 MG tablet, Take 1 tablet by mouth nightly, Disp: 90 tablet, Rfl: 1    buPROPion (WELLBUTRIN XL) 150 MG extended release tablet, Take 3 tablets by mouth every morning, Disp: 270 tablet, Rfl: 0    lamoTRIgine (LAMICTAL) 200 MG tablet, Take 1 tablet by mouth daily, Disp: 90 tablet, Rfl: 0    polyethylene glycol (GLYCOLAX) 17 GM/SCOOP powder, MIX 17 GRAMS OF POWDER IN FLUID AND DRINK ONCE DAILY, Disp: 578 g, Rfl: 3    albuterol sulfate HFA (PROAIR HFA) 108 (90 Base) MCG/ACT inhaler, Inhale 2 puffs into the lungs every 6 hours as needed for Wheezing, Disp: 1 each, Rfl: 3    solifenacin (VESICARE) 10 MG tablet, Take 1 tablet by mouth daily, Disp: 30 tablet, Rfl: 3    atenolol (TENORMIN) 25 MG tablet, Take 1 tablet by mouth daily, Disp: 90 tablet, Rfl: 3    Probiotic Product (PROBIOTIC PO), Take by mouth daily, Disp: , Rfl:     montelukast (SINGULAIR) 10 MG tablet, Take 1 tablet by mouth daily, Disp: 30 tablet, Rfl: 5    The patient is allergic to nurtec [rimegepant sulfate], abilify [aripiprazole], cephalexin, codeine, contrast [iodides], excedrin migraine [aspirin-acetaminophen-caffeine], neurontin [gabapentin], nucynta [tapentadol hcl er], percocet [oxycodone-acetaminophen], tramadol, tylenol with codeine #3 [acetaminophen-codeine], and vicodin [hydrocodone-acetaminophen]. Past Medical History  Marko Hernandez  has a past medical history of Anxiety, Asthma, Autoimmune disease ([address] Bipolar 1 disorder ([facility] [address] Blood in urine, Bronchitis, Cataracts, bilateral, Degenerative cervical disc, Degenerative disc disease, Depression, Hypertension, Hypothyroidism, Interstitial cystitis, Iritis, Migraines, Osteopenia, PONV (postoperative nausea and vomiting), Shortness of breath, and Thyroid disease. Subjective:      Review of Systems   Constitutional:  Positive for fatigue. Negative for activity change and fever. HENT:  Negative for congestion, ear pain, rhinorrhea, sore throat and trouble swallowing. Eyes:  Negative for pain, discharge and redness. Respiratory:  Negative for cough, shortness of breath and wheezing. Cardiovascular: Negative. Gastrointestinal:  Negative for abdominal pain, constipation, diarrhea, nausea and vomiting. Endocrine: Negative. Genitourinary:  Negative for dysuria, frequency and urgency. Musculoskeletal:  Negative for arthralgias, back pain and myalgias. Skin:  Negative for rash. Allergic/Immunologic: Negative. Neurological:  Negative for dizziness, tremors, weakness and headaches. Hematological: Negative. Psychiatric/Behavioral:  Negative for dysphoric mood and sleep disturbance.  The patient is not nervous/anxious. Objective:     /80   Pulse 76   Temp 97.8 °F (36.6 °C) (Oral)   Resp 16   Wt 137 lb (62.1 kg)   SpO2 99%   BMI 26.76 kg/m²     Physical Exam  Constitutional:       General: She is not in acute distress. Appearance: Normal appearance. She is well-developed. She is not diaphoretic. HENT:      Right Ear: Hearing and external ear normal.      Left Ear: Hearing and external ear normal.      Nose: Nose normal. No mucosal edema or rhinorrhea. Mouth/Throat:      Mouth: Mucous membranes are moist.      Dentition: Normal dentition. Pharynx: Uvula midline. No posterior oropharyngeal erythema. Tonsils: No tonsillar exudate. 0 on the right. 0 on the left. Eyes:      General: Lids are normal.         Right eye: No discharge. Left eye: No discharge. Conjunctiva/sclera: Conjunctivae normal.      Right eye: Right conjunctiva is not injected. No chemosis. Left eye: No chemosis. Pupils: Pupils are equal, round, and reactive to light. Neck:      Vascular: No JVD. Trachea: Trachea normal.   Cardiovascular:      Rate and Rhythm: Normal rate and regular rhythm. Heart sounds: Normal heart sounds. No murmur heard. Pulmonary:      Effort: Pulmonary effort is normal. No respiratory distress. Breath sounds: Normal breath sounds. No stridor. Musculoskeletal:         General: No tenderness or deformity. Normal range of motion. Cervical back: Full passive range of motion without pain and normal range of motion. Skin:     General: Skin is warm. Capillary Refill: Capillary refill takes less than 2 seconds. Findings: No rash. Neurological:      Mental Status: She is alert and oriented to person, place, and time. GCS: GCS eye subscore is 4. GCS verbal subscore is 5. GCS motor subscore is 6. Cranial Nerves: No cranial nerve deficit.       Coordination: Coordination normal.      Gait: Gait normal.   Psychiatric:         Mood and Affect: Mood is anxious and depressed. Speech: Speech normal.         Behavior: Behavior normal. Behavior is not withdrawn or hyperactive. Behavior is cooperative. Thought Content: Thought content normal.         Judgment: Judgment normal.       Assessment/Plan:      Russ Hartman was seen today for fatigue and 3 month follow-up. Diagnoses and all orders for this visit:    Recurrent major depressive disorder, in partial remission ([facility] [address]  -     diazePAM (VALIUM) 5 MG tablet; Take 1 tablet by mouth every 8 hours as needed for Anxiety or Sleep (spasms). -     amitriptyline (ELAVIL) 10 MG tablet; Take 1 tablet by mouth nightly    Failed neck syndrome  -     diazePAM (VALIUM) 5 MG tablet; Take 1 tablet by mouth every 8 hours as needed for Anxiety or Sleep (spasms). -     cyclobenzaprine (FLEXERIL) 10 MG tablet; Take 1 tablet by mouth nightly as needed for Muscle spasms    Hypothyroidism, unspecified type  -     levothyroxine (SYNTHROID) 88 MCG tablet; Take 1 tablet by mouth daily    In reviewing the patient's chart, it is noted that the patient's medication regimen is split between her regular PCP and psychiatry. I also noted to her that she is taking Valium, Elavil, and Flexeril during the same day and these are significant \"downers\". In the context of her complaining regularly about exhaustion and fatigue, I reviewed with her her normal daily medication regimen. She states she takes Valium in the morning, Flexeril once later in the afternoon, then amitriptyline at bedtime. Patient states she thinks her current symptoms are are from a upper respiratory infection and she is not concerned about that. Return in about 3 months (around 2/28/2023). Patient instructions given gladys.         Electronically signed by DEYANIRA Sarkar CNP on 11/29/2022 at 1:50 PM

## 2023-02-13 ENCOUNTER — OFFICE VISIT (OUTPATIENT)
Dept: FAMILY MEDICINE CLINIC | Age: 58
End: 2023-02-13

## 2023-02-13 VITALS
SYSTOLIC BLOOD PRESSURE: 124 MMHG | TEMPERATURE: 98.9 F | WEIGHT: 141.8 LBS | BODY MASS INDEX: 27.69 KG/M2 | OXYGEN SATURATION: 98 % | RESPIRATION RATE: 16 BRPM | HEART RATE: 99 BPM | DIASTOLIC BLOOD PRESSURE: 74 MMHG

## 2023-02-13 DIAGNOSIS — N30.10 IC (INTERSTITIAL CYSTITIS): ICD-10-CM

## 2023-02-13 DIAGNOSIS — F33.1 MODERATE EPISODE OF RECURRENT MAJOR DEPRESSIVE DISORDER (HCC): ICD-10-CM

## 2023-02-13 DIAGNOSIS — G44.86 CERVICOGENIC HEADACHE: ICD-10-CM

## 2023-02-13 DIAGNOSIS — F31.70 BIPOLAR DISORDER IN PARTIAL REMISSION, MOST RECENT EPISODE UNSPECIFIED TYPE (HCC): ICD-10-CM

## 2023-02-13 DIAGNOSIS — M96.1 FAILED NECK SYNDROME: ICD-10-CM

## 2023-02-13 DIAGNOSIS — J40 BRONCHITIS: Primary | ICD-10-CM

## 2023-02-13 DIAGNOSIS — F41.9 ANXIETY: ICD-10-CM

## 2023-02-13 DIAGNOSIS — R10.30 LOWER ABDOMINAL PAIN: ICD-10-CM

## 2023-02-13 DIAGNOSIS — F33.41 RECURRENT MAJOR DEPRESSIVE DISORDER, IN PARTIAL REMISSION (HCC): ICD-10-CM

## 2023-02-13 LAB
BILIRUBIN, POC: ABNORMAL
BLOOD URINE, POC: ABNORMAL
CLARITY, POC: CLEAR
COLOR, POC: YELLOW
GLUCOSE URINE, POC: ABNORMAL
KETONES, POC: ABNORMAL
LEUKOCYTE EST, POC: ABNORMAL
NITRITE, POC: ABNORMAL
PH, POC: 7
PROTEIN, POC: 100
SPECIFIC GRAVITY, POC: 1.02
UROBILINOGEN, POC: 0.2

## 2023-02-13 RX ORDER — CIPROFLOXACIN 500 MG/1
500 TABLET, FILM COATED ORAL 2 TIMES DAILY
Qty: 20 TABLET | Refills: 0 | Status: SHIPPED | OUTPATIENT
Start: 2023-02-13 | End: 2023-02-23

## 2023-02-13 RX ORDER — ALBUTEROL SULFATE 90 UG/1
2 AEROSOL, METERED RESPIRATORY (INHALATION) EVERY 6 HOURS PRN
Qty: 1 EACH | Refills: 3 | Status: SHIPPED | OUTPATIENT
Start: 2023-02-13

## 2023-02-13 RX ORDER — DIAZEPAM 5 MG/1
5 TABLET ORAL EVERY 8 HOURS PRN
Qty: 90 TABLET | Refills: 0 | Status: SHIPPED | OUTPATIENT
Start: 2023-02-13 | End: 2024-02-13

## 2023-02-13 RX ORDER — PREDNISONE 10 MG/1
TABLET ORAL
Qty: 30 TABLET | Refills: 0 | Status: SHIPPED | OUTPATIENT
Start: 2023-02-13 | End: 2023-02-23

## 2023-02-13 RX ORDER — DEXTROMETHORPHAN HYDROBROMIDE AND PROMETHAZINE HYDROCHLORIDE 15; 6.25 MG/5ML; MG/5ML
5 SYRUP ORAL 3 TIMES DAILY PRN
Qty: 120 ML | Refills: 0 | Status: SHIPPED | OUTPATIENT
Start: 2023-02-13 | End: 2023-02-20

## 2023-02-13 SDOH — ECONOMIC STABILITY: FOOD INSECURITY: WITHIN THE PAST 12 MONTHS, YOU WORRIED THAT YOUR FOOD WOULD RUN OUT BEFORE YOU GOT MONEY TO BUY MORE.: NEVER TRUE

## 2023-02-13 SDOH — ECONOMIC STABILITY: INCOME INSECURITY: HOW HARD IS IT FOR YOU TO PAY FOR THE VERY BASICS LIKE FOOD, HOUSING, MEDICAL CARE, AND HEATING?: NOT HARD AT ALL

## 2023-02-13 SDOH — ECONOMIC STABILITY: HOUSING INSECURITY
IN THE LAST 12 MONTHS, WAS THERE A TIME WHEN YOU DID NOT HAVE A STEADY PLACE TO SLEEP OR SLEPT IN A SHELTER (INCLUDING NOW)?: NO

## 2023-02-13 SDOH — ECONOMIC STABILITY: FOOD INSECURITY: WITHIN THE PAST 12 MONTHS, THE FOOD YOU BOUGHT JUST DIDN'T LAST AND YOU DIDN'T HAVE MONEY TO GET MORE.: NEVER TRUE

## 2023-02-13 ASSESSMENT — PATIENT HEALTH QUESTIONNAIRE - PHQ9
3. TROUBLE FALLING OR STAYING ASLEEP: 3
7. TROUBLE CONCENTRATING ON THINGS, SUCH AS READING THE NEWSPAPER OR WATCHING TELEVISION: 2
9. THOUGHTS THAT YOU WOULD BE BETTER OFF DEAD, OR OF HURTING YOURSELF: 0
4. FEELING TIRED OR HAVING LITTLE ENERGY: 3
SUM OF ALL RESPONSES TO PHQ QUESTIONS 1-9: 11
SUM OF ALL RESPONSES TO PHQ9 QUESTIONS 1 & 2: 2
10. IF YOU CHECKED OFF ANY PROBLEMS, HOW DIFFICULT HAVE THESE PROBLEMS MADE IT FOR YOU TO DO YOUR WORK, TAKE CARE OF THINGS AT HOME, OR GET ALONG WITH OTHER PEOPLE: 1
SUM OF ALL RESPONSES TO PHQ QUESTIONS 1-9: 11
8. MOVING OR SPEAKING SO SLOWLY THAT OTHER PEOPLE COULD HAVE NOTICED. OR THE OPPOSITE, BEING SO FIGETY OR RESTLESS THAT YOU HAVE BEEN MOVING AROUND A LOT MORE THAN USUAL: 0
SUM OF ALL RESPONSES TO PHQ QUESTIONS 1-9: 11
2. FEELING DOWN, DEPRESSED OR HOPELESS: 1
1. LITTLE INTEREST OR PLEASURE IN DOING THINGS: 1
SUM OF ALL RESPONSES TO PHQ QUESTIONS 1-9: 11
6. FEELING BAD ABOUT YOURSELF - OR THAT YOU ARE A FAILURE OR HAVE LET YOURSELF OR YOUR FAMILY DOWN: 0
5. POOR APPETITE OR OVEREATING: 1

## 2023-02-13 ASSESSMENT — ENCOUNTER SYMPTOMS
CHEST TIGHTNESS: 1
ABDOMINAL DISTENTION: 0
SHORTNESS OF BREATH: 0
WHEEZING: 0
ABDOMINAL PAIN: 0
COUGH: 1
BACK PAIN: 0

## 2023-02-13 NOTE — PROGRESS NOTES
[address] [place] [facility] [address]  Dept: [phone]  Dept Fax: [phone]  Loc: [phone]  PROGRESS NOTE      VisitDate: 2/13/2023    Azeb Bustos is a 56 y.o. female who presents today for:     Chief Complaint   Patient presents with    URI     Symptoms x1wk, cough-worse at night or while laying down, achey, denies fevertaking liv seltzer at home    Urinary Tract Infection     Lower abd pain         Subjective:  Patient presents with complaint of cough chest congestion chest tightness wheezing past week. Patient also complains of some mild dysuria pelvic pressure past couple of days. Denies fever chest pain pain hematuria flank pain. Medical history reviewed. Patient does report worsening depressive mood frequently tearful. Take medications as prescribed. Currently consults with [facility] for psychiatric care. Review of Systems   Constitutional:  Positive for fatigue. Negative for activity change, appetite change, chills and fever. HENT:  Positive for congestion. Eyes:  Negative for visual disturbance. Respiratory:  Positive for cough and chest tightness. Negative for shortness of breath and wheezing. Cardiovascular:  Negative for chest pain, palpitations and leg swelling. Gastrointestinal:  Negative for abdominal distention and abdominal pain. Genitourinary:  Negative for dysuria. Musculoskeletal:  Negative for arthralgias, back pain and neck pain. Skin: Negative. Negative for rash. Neurological:  Negative for dizziness, light-headedness and headaches. Hematological:  Negative for adenopathy. Psychiatric/Behavioral:  Positive for dysphoric mood. Negative for decreased concentration. All other systems reviewed and are negative.   Past Medical History:   Diagnosis Date    Anxiety     Asthma     Autoimmune disease ([facility] [address]     Bipolar 1 disorder ([facility] [address]     Blood in urine     Bronchitis Cataracts, bilateral     Degenerative cervical disc     Degenerative disc disease     Depression     Hypertension     Hypothyroidism     Interstitial cystitis     Iritis 2013    OU      Migraines     Osteopenia     PONV (postoperative nausea and vomiting)     Shortness of breath     Thyroid disease       Past Surgical History:   Procedure Laterality Date    BACK SURGERY      multiple injections and nerve blocks at cervical and lumbar    CERVICAL FUSION  11/09    c 4,5,6    CERVICAL FUSION  11/20/2015    CYSTOSCOPY N/A 4/25/2019    CYSTOSCOPY WITH HYDRODISTENTION WITH INSTILLATION OF DMSO AND MARCAINE performed by James Perez MD at [address] N/A 11/4/2021    CYSTOSCOPY HYDRODISTENTION WITH DMSO, BLADDER BOTOX 100 UNITS performed by Zev Yañez MD at [address] (W OR W/O BIOPSY)  2011    HC PAIN BLOCK Bilateral 11/6/2019    Third occipital nerve block bilateral. Left trigger point trapezius performed by Mark Jeffrey MD at [facility] ([facility])  4/02    INCONTINENCE SURGERY      KNEE SURGERY      SPINAL CORD STIMULATOR IMPLANTATION N/A 11/20/2019    STAGE 1 / INSERTION OF INTERSTIM DEVICE performed by James Perez MD at [address] N/A 12/4/2019    STAGE II STIMULATOR INSERTION performed by James Perez MD at [facility] [address] N/A 11/4/2021    INTERSTIM REMOVAL performed by Zev Yañez MD at [address] [facility] History   Problem Relation Age of Onset    Rheum Arthritis Mother     Thyroid Disease Mother     Colon Polyps Mother         esophageal polyps    Other Mother     High Blood Pressure Father     Diabetes Father     High Blood Pressure Sister     Thyroid Disease Brother     High Blood Pressure Brother     Liver Cancer Maternal Uncle     Cancer Maternal Grandfather         pancreatic cancer    Colon Cancer Neg Hx      Social History     Tobacco Use Smoking status: Former     Packs/day: 0.25     Years: 20.00     Pack years: 5.00     Types: Cigarettes     Start date:      Quit date:      Years since quittin.1    Smokeless tobacco: Never    Tobacco comments:     1 pack per week intermit for 20 yrs   Substance Use Topics    Alcohol use: No     Alcohol/week: 0.0 standard drinks      Current Outpatient Medications   Medication Sig Dispense Refill    albuterol sulfate HFA (PROAIR HFA) 108 (90 Base) MCG/ACT inhaler Inhale 2 puffs into the lungs every 6 hours as needed for Wheezing 1 each 3    ciprofloxacin (CIPRO) 500 MG tablet Take 1 tablet by mouth 2 times daily for 10 days 20 tablet 0    predniSONE (DELTASONE) 10 MG tablet 4 po qd for 3 days, then 3 po qd for 3 days, then 2 po qd for 3 days, then 1 po qd for 3 days 30 tablet 0    promethazine-dextromethorphan (PROMETHAZINE-DM) 6.25-15 MG/5ML syrup Take 5 mLs by mouth 3 times daily as needed for Cough 120 mL 0    diazePAM (VALIUM) 5 MG tablet Take 1 tablet by mouth every 8 hours as needed for Anxiety or Sleep (spasms).  90 tablet 0    levothyroxine (SYNTHROID) 88 MCG tablet Take 1 tablet by mouth daily 90 tablet 1    cyclobenzaprine (FLEXERIL) 10 MG tablet Take 1 tablet by mouth nightly as needed for Muscle spasms 30 tablet 1    amitriptyline (ELAVIL) 10 MG tablet Take 1 tablet by mouth nightly 90 tablet 1    buPROPion (WELLBUTRIN XL) 150 MG extended release tablet Take 3 tablets by mouth every morning 270 tablet 0    lamoTRIgine (LAMICTAL) 200 MG tablet Take 1 tablet by mouth daily 90 tablet 0    polyethylene glycol (GLYCOLAX) 17 GM/SCOOP powder MIX 17 GRAMS OF POWDER IN FLUID AND DRINK ONCE DAILY 578 g 3    solifenacin (VESICARE) 10 MG tablet Take 1 tablet by mouth daily 30 tablet 3    atenolol (TENORMIN) 25 MG tablet Take 1 tablet by mouth daily 90 tablet 3    Probiotic Product (PROBIOTIC PO) Take by mouth daily      montelukast (SINGULAIR) 10 MG tablet Take 1 tablet by mouth daily 30 tablet 5     No current facility-administered medications for this visit. Allergies   Allergen Reactions    Nurtec [Rimegepant Sulfate] Other (See Comments)     Makes her jittery    Abilify [Aripiprazole] Other (See Comments)     Vivid dreams    Cephalexin Nausea And Vomiting    Codeine Nausea And Vomiting    Contrast [Iodides] Nausea And Vomiting     Pt stated she felt very sick to her stomach    Excedrin Migraine [Aspirin-Acetaminophen-Caffeine] Nausea And Vomiting    Neurontin [Gabapentin] Anxiety    Nucynta [Tapentadol Hcl Er] Other (See Comments)     Terrible headache    Percocet [Oxycodone-Acetaminophen] Nausea And Vomiting    Tramadol Nausea And Vomiting    Tylenol With Codeine #3 [Acetaminophen-Codeine]      Nausea     Vicodin [Hydrocodone-Acetaminophen] Nausea And Vomiting     Health Maintenance   Topic Date Due    COVID-19 Vaccine (1) Never done    Pneumococcal 0-64 years Vaccine (1 - PCV) Never done    Hepatitis C screen  Never done    DTaP/Tdap/Td vaccine (1 - Tdap) Never done    Shingles vaccine (1 of 2) Never done    A1C test (Diabetic or Prediabetic)  12/12/2017    Annual Wellness Visit (AWV)  Never done    Flu vaccine (1) Never done    Depression Monitoring  01/31/2023    Breast cancer screen  12/29/2023    Lipids  06/23/2027    Colorectal Cancer Screen  08/06/2030    HIV screen  Completed    Hepatitis A vaccine  Aged Out    Hib vaccine  Aged Out    Meningococcal (ACWY) vaccine  Aged Out         Objective:     Physical Exam  Vitals and nursing note reviewed. Constitutional:       Appearance: Normal appearance. She is well-developed and normal weight. HENT:      Head: Normocephalic. Right Ear: Tympanic membrane and ear canal normal.      Left Ear: Tympanic membrane and ear canal normal.      Nose: Nose normal.      Mouth/Throat:      Pharynx: Oropharynx is clear. Eyes:      Extraocular Movements: Extraocular movements intact.       Conjunctiva/sclera: Conjunctivae normal.   Cardiovascular:      Rate and Rhythm: Normal rate and regular rhythm. Pulses: Normal pulses. Heart sounds: Normal heart sounds. Pulmonary:      Effort: Pulmonary effort is normal.      Breath sounds: Wheezing and rhonchi present. Abdominal:      General: Bowel sounds are normal.      Palpations: Abdomen is soft. Musculoskeletal:         General: Normal range of motion. Cervical back: Neck supple. Skin:     General: Skin is warm and dry. Neurological:      General: No focal deficit present. Mental Status: She is alert and oriented to person, place, and time. Psychiatric:         Mood and Affect: Mood normal.         Thought Content: Thought content normal.         Judgment: Judgment normal.     /74 (Site: Right Upper Arm)   Pulse 99   Temp 98.9 °F (37.2 °C) (Oral)   Resp 16   Wt 141 lb 12.8 oz (64.3 kg)   SpO2 98%   BMI 27.69 kg/m²       Impression/Plan:  1. Bronchitis    2. Lower abdominal pain    3. Moderate episode of recurrent major depressive disorder ([facility] [address]    4. IC (interstitial cystitis)    5. Failed neck syndrome    6. Recurrent major depressive disorder, in partial remission ([address]       Diagnosis Orders   1. Bronchitis        2. Lower abdominal pain  POCT Urinalysis No Micro (Auto)      3. Moderate episode of recurrent major depressive disorder ([address]        4. IC (interstitial cystitis)        5. Failed neck syndrome  diazePAM (VALIUM) 5 MG tablet      6. Recurrent major depressive disorder, in partial remission ([facility])  diazePAM (VALIUM) 5 MG tablet      7. Bipolar disorder in partial remission, most recent episode unspecified type ([address]        8. Cervicogenic headache        9.  Anxiety          23 1158     Color, UA yellow    Clarity, UA clear    Glucose, UA POC neg    Bilirubin, UA neg    Ketones, UA neg    Spec Grav, UA 1.020    Blood, UA POC moderate    pH, UA 7.0    Protein, UA     Urobilinogen, UA 0.2    Leukocytes, UA neg    Nitrite, UA neg               Specimen Collected: 23 11:58 EST Last Resulted: 23 11:58 EST        Lab Flowsheet     Order Details     View Encounter     Lab and Collection Details     Routing     Result History     View Encounter Conversation           Result Care Coordination      Patient Communication     Not Released  Not seen Back to Top           Result Information    Flag: Abnormal Abnormal   Status: Final result (Collected: 2023 11:58) Provider Status: Reviewed     All Reviewers List    JohnDEYANIRA Heredia CNP on 2023 13:36       POCT Urinalysis No Micro (Auto): Patient Communication     Not Released  Not seen     Routing History    Priority Sent On From To Message Type    2023 11:58 AM Siobhan Neumann, APRN - CNP Results     Click to Print Result  View SmartLink Info    POCT Urinalysis No Micro [employer]) (Order #[id number]) on 23     Order Report     Order Details  Requested Prescriptions     Signed Prescriptions Disp Refills    albuterol sulfate HFA (PROAIR HFA) 108 (90 Base) MCG/ACT inhaler 1 each 3     Sig: Inhale 2 puffs into the lungs every 6 hours as needed for Wheezing    ciprofloxacin (CIPRO) 500 MG tablet 20 tablet 0     Sig: Take 1 tablet by mouth 2 times daily for 10 days    predniSONE (DELTASONE) 10 MG tablet 30 tablet 0     Si po qd for 3 days, then 3 po qd for 3 days, then 2 po qd for 3 days, then 1 po qd for 3 days    promethazine-dextromethorphan (PROMETHAZINE-DM) 6.25-15 MG/5ML syrup 120 mL 0     Sig: Take 5 mLs by mouth 3 times daily as needed for Cough    diazePAM (VALIUM) 5 MG tablet 90 tablet 0     Sig: Take 1 tablet by mouth every 8 hours as needed for Anxiety or Sleep (spasms). Orders Placed This Encounter   Procedures    POCT Urinalysis No Micro (Auto)     30 min  Patient giveneducational materials - see patient instructions. Discussed use, benefit, and side effects of prescribed medications. All patient questions answered. Pt voiced understanding. Reviewed health maintenance. Patient agreedwith treatment plan. Follow up as directed. Albuterol HFA refilled.   Cipro 500 twice daily for 10 days prednisone taper prescribed Promethazine DM cough syrup 5 mL 3 times daily as needed cough Valium 5 mg every 8 as needed #90 OARRS report reviewed       Electronically signed by DEYANIRA Gifford CNP on 2/13/2023 at 2:41 PM

## 2023-02-24 ENCOUNTER — TELEPHONE (OUTPATIENT)
Dept: FAMILY MEDICINE CLINIC | Age: 58
End: 2023-02-24

## 2023-02-24 RX ORDER — PREDNISONE 10 MG/1
TABLET ORAL
Qty: 30 TABLET | Refills: 0 | Status: SHIPPED | OUTPATIENT
Start: 2023-02-24 | End: 2023-03-06

## 2023-02-24 RX ORDER — DEXTROMETHORPHAN HYDROBROMIDE AND PROMETHAZINE HYDROCHLORIDE 15; 6.25 MG/5ML; MG/5ML
5 SYRUP ORAL 3 TIMES DAILY PRN
Qty: 120 ML | Refills: 0 | Status: SHIPPED | OUTPATIENT
Start: 2023-02-24 | End: 2023-03-03

## 2023-02-24 RX ORDER — AZITHROMYCIN 250 MG/1
TABLET, FILM COATED ORAL
Qty: 6 TABLET | Refills: 0 | Status: SHIPPED | OUTPATIENT
Start: 2023-02-24 | End: 2023-03-06

## 2023-02-24 NOTE — TELEPHONE ENCOUNTER
----- Message from Phyllis Lang sent at 2/24/2023 10:13 AM EST -----  Subject: Message to Provider    QUESTIONS  Information for Provider? Patient called in she was seen in the office   last week she is still having the issues with coughing . she stated that   all of her medication is currently gone and was wanting to know if the   doctor could call her something else in. for the cough. and she has not   gotten her voice back yet . she is also having chest discomfort due to the   cough.   ---------------------------------------------------------------------------  --------------  Ammon Segovia NXF  [phone]; OK to leave message on voicemail  ---------------------------------------------------------------------------  --------------  SCRIPT ANSWERS  Relationship to Patient?  Self

## 2023-03-15 ENCOUNTER — OFFICE VISIT (OUTPATIENT)
Dept: PSYCHIATRY | Age: 58
End: 2023-03-15
Payer: MEDICARE

## 2023-03-15 DIAGNOSIS — F41.1 GAD (GENERALIZED ANXIETY DISORDER): ICD-10-CM

## 2023-03-15 DIAGNOSIS — F31.81 BIPOLAR II DISORDER (HCC): Primary | ICD-10-CM

## 2023-03-15 PROCEDURE — 3017F COLORECTAL CA SCREEN DOC REV: CPT | Performed by: NURSE PRACTITIONER

## 2023-03-15 PROCEDURE — G8419 CALC BMI OUT NRM PARAM NOF/U: HCPCS | Performed by: NURSE PRACTITIONER

## 2023-03-15 PROCEDURE — 1036F TOBACCO NON-USER: CPT | Performed by: NURSE PRACTITIONER

## 2023-03-15 PROCEDURE — G8428 CUR MEDS NOT DOCUMENT: HCPCS | Performed by: NURSE PRACTITIONER

## 2023-03-15 PROCEDURE — G8484 FLU IMMUNIZE NO ADMIN: HCPCS | Performed by: NURSE PRACTITIONER

## 2023-03-15 PROCEDURE — 99214 OFFICE O/P EST MOD 30 MIN: CPT | Performed by: NURSE PRACTITIONER

## 2023-03-15 RX ORDER — LAMOTRIGINE 200 MG/1
200 TABLET ORAL DAILY
Qty: 90 TABLET | Refills: 0 | Status: SHIPPED | OUTPATIENT
Start: 2023-03-15

## 2023-03-15 RX ORDER — BUPROPION HYDROCHLORIDE 150 MG/1
450 TABLET ORAL EVERY MORNING
Qty: 270 TABLET | Refills: 0 | Status: SHIPPED | OUTPATIENT
Start: 2023-03-15
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
## 2023-05-08 ENCOUNTER — OFFICE VISIT (OUTPATIENT)
Dept: FAMILY MEDICINE CLINIC | Age: 58
End: 2023-05-08

## 2023-05-08 VITALS
BODY MASS INDEX: 27.5 KG/M2 | SYSTOLIC BLOOD PRESSURE: 128 MMHG | HEART RATE: 66 BPM | RESPIRATION RATE: 16 BRPM | WEIGHT: 140.8 LBS | TEMPERATURE: 98.4 F | DIASTOLIC BLOOD PRESSURE: 86 MMHG

## 2023-05-08 DIAGNOSIS — F31.70 BIPOLAR DISORDER IN PARTIAL REMISSION, MOST RECENT EPISODE UNSPECIFIED TYPE (HCC): Primary | ICD-10-CM

## 2023-05-08 DIAGNOSIS — M54.50 CHRONIC MIDLINE LOW BACK PAIN, UNSPECIFIED WHETHER SCIATICA PRESENT: ICD-10-CM

## 2023-05-08 DIAGNOSIS — G89.29 CHRONIC MIDLINE LOW BACK PAIN, UNSPECIFIED WHETHER SCIATICA PRESENT: ICD-10-CM

## 2023-05-08 DIAGNOSIS — E03.9 HYPOTHYROIDISM, UNSPECIFIED TYPE: ICD-10-CM

## 2023-05-08 DIAGNOSIS — G44.86 CERVICOGENIC HEADACHE: ICD-10-CM

## 2023-05-08 DIAGNOSIS — F33.41 RECURRENT MAJOR DEPRESSIVE DISORDER, IN PARTIAL REMISSION (HCC): ICD-10-CM

## 2023-05-08 DIAGNOSIS — Z13.220 SCREENING CHOLESTEROL LEVEL: ICD-10-CM

## 2023-05-08 DIAGNOSIS — F41.9 ANXIETY: ICD-10-CM

## 2023-05-08 DIAGNOSIS — N30.10 IC (INTERSTITIAL CYSTITIS): ICD-10-CM

## 2023-05-08 DIAGNOSIS — M96.1 FAILED NECK SYNDROME: ICD-10-CM

## 2023-05-08 RX ORDER — DIAZEPAM 5 MG/1
5 TABLET ORAL EVERY 8 HOURS PRN
Qty: 90 TABLET | Refills: 0 | Status: SHIPPED | OUTPATIENT
Start: 2023-05-08 | End: 2024-05-07

## 2023-05-08 RX ORDER — LEVOTHYROXINE SODIUM 88 UG/1
88 TABLET ORAL DAILY
Qty: 90 TABLET | Refills: 1 | Status: SHIPPED | OUTPATIENT
Start: 2023-05-08

## 2023-05-10 ASSESSMENT — ENCOUNTER SYMPTOMS
SHORTNESS OF BREATH: 0
ABDOMINAL DISTENTION: 0
COUGH: 0
BACK PAIN: 1
WHEEZING: 0
CHEST TIGHTNESS: 0
ABDOMINAL PAIN: 0

## 2023-05-10 NOTE — PROGRESS NOTES
General: Skin is warm and dry. Neurological:      General: No focal deficit present. Mental Status: She is alert and oriented to person, place, and time. Psychiatric:         Mood and Affect: Mood normal.         Thought Content: Thought content normal.         Judgment: Judgment normal.     /86 (Site: Right Upper Arm)   Pulse 66   Temp 98.4 °F (36.9 °C) (Oral)   Resp 16   Wt 140 lb 12.8 oz (63.9 kg)   BMI 27.50 kg/m²       Impression/Plan:  1. Bipolar disorder in partial remission, most recent episode unspecified type ([facility]    2. Hypothyroidism, unspecified type    3. Anxiety    4. Failed neck syndrome    5. IC (interstitial cystitis)    6. Chronic midline low back pain, unspecified whether sciatica present    7. Cervicogenic headache    8. Recurrent major depressive disorder, in partial remission ([facility]    9. Screening cholesterol level      Requested Prescriptions     Signed Prescriptions Disp Refills    levothyroxine (SYNTHROID) 88 MCG tablet 90 tablet 1     Sig: Take 1 tablet by mouth daily    diazePAM (VALIUM) 5 MG tablet 90 tablet 0     Sig: Take 1 tablet by mouth every 8 hours as needed for Anxiety or Sleep (spasms). Orders Placed This Encounter   Procedures    CBC with Auto Differential     Standing Status:   Future     Standing Expiration Date:   5/7/2024    Comprehensive Metabolic Panel     Standing Status:   Future     Standing Expiration Date:   5/7/2024    T4, Free     Standing Status:   Future     Standing Expiration Date:   5/7/2024    TSH     Standing Status:   Future     Standing Expiration Date:   5/7/2024    Lipid Panel     Standing Status:   Future     Standing Expiration Date:   5/7/2024     Order Specific Question:   Is Patient Fasting?/# of Hours     Answer:   yes/12   cBC CMP FLP TSH free T4 ordered. Valium refilled 5 mg every 8 #90 OARRS report reviewed. Synthroid refilled 88 mcg p.o. daily. Tdap provided in office today. Seen today for wellness visit.   Discussed

## 2023-05-15 ENCOUNTER — HOSPITAL ENCOUNTER (OUTPATIENT)
Age: 58
Discharge: HOME | End: 2023-05-15
Payer: MEDICARE

## 2023-05-15 ENCOUNTER — TELEPHONE (OUTPATIENT)
Dept: FAMILY MEDICINE CLINIC | Age: 58
End: 2023-05-15

## 2023-05-15 DIAGNOSIS — Z13.220 SCREENING CHOLESTEROL LEVEL: ICD-10-CM

## 2023-05-15 DIAGNOSIS — E03.9 HYPOTHYROIDISM, UNSPECIFIED TYPE: Primary | ICD-10-CM

## 2023-05-15 DIAGNOSIS — G44.86 CERVICOGENIC HEADACHE: ICD-10-CM

## 2023-05-15 DIAGNOSIS — F31.70 BIPOLAR DISORDER IN PARTIAL REMISSION, MOST RECENT EPISODE UNSPECIFIED TYPE (HCC): ICD-10-CM

## 2023-05-15 DIAGNOSIS — E03.9 HYPOTHYROIDISM, UNSPECIFIED TYPE: ICD-10-CM

## 2023-05-15 LAB
ALBUMIN SERPL BCG-MCNC: 5.1 G/DL (ref 3.5–5.1)
ALP SERPL-CCNC: 86 U/L (ref 38–126)
ALT SERPL W/O P-5'-P-CCNC: 23 U/L (ref 11–66)
ANION GAP SERPL CALC-SCNC: 12 MEQ/L (ref 8–16)
ANION GAP SERPL CALC-SCNC: 14 MEQ/L (ref 8–16)
AST SERPL-CCNC: 22 U/L (ref 5–40)
BASOPHILS ABSOLUTE: 0 THOU/MM3 (ref 0–0.1)
BASOPHILS NFR BLD AUTO: 0.7 %
BILIRUB SERPL-MCNC: 0.2 MG/DL (ref 0.3–1.2)
BUN SERPL-MCNC: 12 MG/DL (ref 7–22)
BUN SERPL-MCNC: 12 MG/DL (ref 7–22)
CALCIUM SERPL-MCNC: 9.3 MG/DL (ref 8.5–10.5)
CALCIUM SERPL-MCNC: 9.4 MG/DL (ref 8.5–10.5)
CHLORIDE SERPL-SCNC: 106 MEQ/L (ref 98–111)
CHLORIDE SERPL-SCNC: 107 MEQ/L (ref 98–111)
CHOLEST SERPL-MCNC: 204 MG/DL (ref 100–199)
CO2 SERPL-SCNC: 24 MEQ/L (ref 23–33)
CO2 SERPL-SCNC: 25 MEQ/L (ref 23–33)
CREAT SERPL-MCNC: 0.7 MG/DL (ref 0.4–1.2)
CREAT SERPL-MCNC: 0.7 MG/DL (ref 0.4–1.2)
DEPRECATED RDW RBC AUTO: 50.7 FL (ref 35–45)
EOSINOPHIL NFR BLD AUTO: 4.4 %
EOSINOPHILS ABSOLUTE: 0.3 THOU/MM3 (ref 0–0.4)
ERYTHROCYTE [DISTWIDTH] IN BLOOD BY AUTOMATED COUNT: 14 % (ref 11.5–14.5)
GFR SERPL CREATININE-BSD FRML MDRD: > 60 ML/MIN/1.73M2
GFR SERPL CREATININE-BSD FRML MDRD: > 60 ML/MIN/1.73M2
GLUCOSE SERPL-MCNC: 90 MG/DL (ref 70–108)
GLUCOSE SERPL-MCNC: 92 MG/DL (ref 70–108)
HCT VFR BLD AUTO: 42.7 % (ref 37–47)
HDLC SERPL-MCNC: 87 MG/DL
HGB BLD-MCNC: 13.3 GM/DL (ref 12–16)
IMM GRANULOCYTES # BLD AUTO: 0.03 THOU/MM3 (ref 0–0.07)
IMM GRANULOCYTES NFR BLD AUTO: 0.4 %
LDLC SERPL CALC-MCNC: 88 MG/DL
LYMPHOCYTES ABSOLUTE: 1.5 THOU/MM3 (ref 1–4.8)
LYMPHOCYTES NFR BLD AUTO: 22.4 %
MCH RBC QN AUTO: 30.5 PG (ref 26–33)
MCHC RBC AUTO-ENTMCNC: 31.1 GM/DL (ref 32.2–35.5)
MCV RBC AUTO: 97.9 FL (ref 81–99)
MONOCYTES ABSOLUTE: 0.5 THOU/MM3 (ref 0.4–1.3)
MONOCYTES NFR BLD AUTO: 7.1 %
NEUTROPHILS NFR BLD AUTO: 65 %
NRBC BLD AUTO-RTO: 0 /100 WBC
PLATELET # BLD AUTO: 353 THOU/MM3 (ref 130–400)
PMV BLD AUTO: 10.8 FL (ref 9.4–12.4)
POTASSIUM SERPL-SCNC: 4.1 MEQ/L (ref 3.5–5.2)
POTASSIUM SERPL-SCNC: 4.2 MEQ/L (ref 3.5–5.2)
PROT SERPL-MCNC: 7.3 G/DL (ref 6.1–8)
RBC # BLD AUTO: 4.36 MILL/MM3 (ref 4.2–5.4)
SEGMENTED NEUTROPHILS ABSOLUTE COUNT: 4.5 THOU/MM3 (ref 1.8–7.7)
SODIUM SERPL-SCNC: 143 MEQ/L (ref 135–145)
SODIUM SERPL-SCNC: 145 MEQ/L (ref 135–145)
T4 FREE SERPL-MCNC: 1.28 NG/DL (ref 0.93–1.76)
TRIGL SERPL-MCNC: 146 MG/DL (ref 0–199)
TSH SERPL DL<=0.005 MIU/L-ACNC: 3.73 UIU/ML (ref 0.4–4.2)
WBC # BLD AUTO: 6.9 THOU/MM3 (ref 4.8–10.8)

## 2023-05-15 PROCEDURE — 93010 ELECTROCARDIOGRAM REPORT: CPT | Performed by: INTERNAL MEDICINE

## 2023-05-15 PROCEDURE — 93005 ELECTROCARDIOGRAM TRACING: CPT | Performed by: ORTHOPAEDIC SURGERY

## 2023-05-15 PROCEDURE — 80061 LIPID PANEL: CPT

## 2023-05-15 PROCEDURE — 85025 COMPLETE CBC W/AUTO DIFF WBC: CPT

## 2023-05-15 PROCEDURE — 80053 COMPREHEN METABOLIC PANEL: CPT

## 2023-05-15 PROCEDURE — 84443 ASSAY THYROID STIM HORMONE: CPT

## 2023-05-15 PROCEDURE — 84439 ASSAY OF FREE THYROXINE: CPT

## 2023-05-15 PROCEDURE — 36415 COLL VENOUS BLD VENIPUNCTURE: CPT

## 2023-05-15 RX ORDER — LEVOTHYROXINE SODIUM 0.1 MG/1
100 TABLET ORAL DAILY
Qty: 30 TABLET | Refills: 1 | Status: SHIPPED | OUTPATIENT
Start: 2023-05-15

## 2023-05-15 NOTE — TELEPHONE ENCOUNTER
----- Message from DEYANIRA Maher - CNP sent at 5/15/2023  3:40 PM EDT -----  cBC CMP FLP within normal limits. TSH on the high end of normal 3.7 from 0.5.   Recommend increase Synthroid to 100 mcg p.o. daily repeat TSH free T4 6 weeks

## 2023-05-15 NOTE — TELEPHONE ENCOUNTER
Notified of lab results. She would like new dosage of levothyroxine sent to [facility]. Labs are mailed to her to repeat in 6 weeks. Medication list adjusted.

## 2023-05-18 LAB
EKG ATRIAL RATE: 70 BPM
EKG P AXIS: 81 DEGREES
EKG P-R INTERVAL: 160 MS
EKG Q-T INTERVAL: 420 MS
EKG QRS DURATION: 86 MS
EKG QTC CALCULATION (BAZETT): 453 MS
EKG R AXIS: 47 DEGREES
EKG T AXIS: 71 DEGREES
EKG VENTRICULAR RATE: 70 BPM

## 2023-07-03 ENCOUNTER — NURSE ONLY (OUTPATIENT)
Dept: LAB | Age: 58
End: 2023-07-03

## 2023-07-03 DIAGNOSIS — E03.9 HYPOTHYROIDISM, UNSPECIFIED TYPE: ICD-10-CM

## 2023-07-03 LAB
T4 FREE SERPL-MCNC: 1.45 NG/DL (ref 0.93–1.76)
TSH SERPL DL<=0.005 MIU/L-ACNC: 0.68 UIU/ML (ref 0.4–4.2)

## 2023-07-05 RX ORDER — BUPROPION HYDROCHLORIDE 150 MG/1
450 TABLET ORAL EVERY MORNING
Qty: 270 TABLET | Refills: 0 | Status: SHIPPED | OUTPATIENT
Start: 2023-07-05

## 2023-07-05 RX ORDER — LEVOTHYROXINE SODIUM 0.1 MG/1
TABLET ORAL
Qty: 30 TABLET | Refills: 2 | Status: SHIPPED | OUTPATIENT
Start: 2023-07-05

## 2023-07-05 RX ORDER — LAMOTRIGINE 200 MG/1
200 TABLET ORAL DAILY
Qty: 90 TABLET | Refills: 0 | Status: SHIPPED | OUTPATIENT
Start: 2023-07-05

## 2023-07-05 NOTE — TELEPHONE ENCOUNTER
Patient called in requesting the following medications:    Bupropion XL 150mg  #270 with no refills to the [facility] DR ZHANNA TAN on [employer] [facility] [email or web address]. Previous Rx sent 03/15/23. Lamotrigine 200mg  #90 with no refills to the [facility] DR ZHANNA TAN on [employer] [facility] [email or web address]. Previous Rx sent 03/15/23. Patient stated she is out of both medications and going on vacation tomorrow. Last visit 03/15/23  Next visit 10/04/23    Pending your approval for a 90 day supply with no refills.

## 2023-08-15 ENCOUNTER — OFFICE VISIT (OUTPATIENT)
Dept: FAMILY MEDICINE CLINIC | Age: 58
End: 2023-08-15
Payer: MEDICARE

## 2023-08-15 VITALS
HEART RATE: 76 BPM | SYSTOLIC BLOOD PRESSURE: 130 MMHG | DIASTOLIC BLOOD PRESSURE: 88 MMHG | WEIGHT: 134 LBS | TEMPERATURE: 98.1 F | BODY MASS INDEX: 26.17 KG/M2 | RESPIRATION RATE: 16 BRPM

## 2023-08-15 DIAGNOSIS — M96.1 FAILED NECK SYNDROME: ICD-10-CM

## 2023-08-15 DIAGNOSIS — F41.9 ANXIETY: Primary | ICD-10-CM

## 2023-08-15 DIAGNOSIS — E03.9 HYPOTHYROIDISM, UNSPECIFIED TYPE: ICD-10-CM

## 2023-08-15 DIAGNOSIS — Z12.31 ENCOUNTER FOR SCREENING MAMMOGRAM FOR MALIGNANT NEOPLASM OF BREAST: ICD-10-CM

## 2023-08-15 DIAGNOSIS — F33.41 RECURRENT MAJOR DEPRESSIVE DISORDER, IN PARTIAL REMISSION (HCC): ICD-10-CM

## 2023-08-15 PROCEDURE — 3017F COLORECTAL CA SCREEN DOC REV: CPT | Performed by: NURSE PRACTITIONER

## 2023-08-15 PROCEDURE — G8419 CALC BMI OUT NRM PARAM NOF/U: HCPCS | Performed by: NURSE PRACTITIONER

## 2023-08-15 PROCEDURE — 99214 OFFICE O/P EST MOD 30 MIN: CPT | Performed by: NURSE PRACTITIONER

## 2023-08-15 PROCEDURE — 3078F DIAST BP <80 MM HG: CPT | Performed by: NURSE PRACTITIONER

## 2023-08-15 PROCEDURE — G8427 DOCREV CUR MEDS BY ELIG CLIN: HCPCS | Performed by: NURSE PRACTITIONER

## 2023-08-15 PROCEDURE — 3074F SYST BP LT 130 MM HG: CPT | Performed by: NURSE PRACTITIONER

## 2023-08-15 PROCEDURE — 1036F TOBACCO NON-USER: CPT | Performed by: NURSE PRACTITIONER

## 2023-08-15 RX ORDER — LEVOTHYROXINE SODIUM 0.1 MG/1
100 TABLET ORAL DAILY
Qty: 90 TABLET | Refills: 3 | Status: SHIPPED | OUTPATIENT
Start: 2023-08-15

## 2023-08-15 RX ORDER — DIAZEPAM 5 MG/1
5 TABLET ORAL EVERY 8 HOURS PRN
Qty: 90 TABLET | Refills: 0 | Status: SHIPPED | OUTPATIENT
Start: 2023-08-15 | End: 2024-08-14

## 2023-08-15 RX ORDER — HYDROCODONE BITARTRATE AND ACETAMINOPHEN 5; 325 MG/1; MG/1
TABLET ORAL
COMMUNITY
Start: 2023-05-19

## 2023-08-16 ASSESSMENT — ENCOUNTER SYMPTOMS
COUGH: 0
SHORTNESS OF BREATH: 0
ABDOMINAL PAIN: 0
CHEST TIGHTNESS: 0
BACK PAIN: 1
WHEEZING: 0
ABDOMINAL DISTENTION: 0

## 2023-08-16 NOTE — PROGRESS NOTES
vomiting)     Shortness of breath     Thyroid disease       Past Surgical History:   Procedure Laterality Date    BACK SURGERY      multiple injections and nerve blocks at cervical and lumbar    CERVICAL FUSION      c 4,5,6    CERVICAL FUSION  2015    CYSTOSCOPY N/A 2019    CYSTOSCOPY WITH HYDRODISTENTION WITH INSTILLATION OF DMSO AND MARCAINE performed by Julieth Leach MD at [facility] N/A 2021    CYSTOSCOPY HYDRODISTENTION WITH DMSO, BLADDER BOTOX 100 UNITS performed by Renia Brunner MD at [address] [place] [address] (W OR W/O BIOPSY)      HC PAIN BLOCK Bilateral 2019    Third occipital nerve block bilateral. Left trigger point trapezius performed by Jason Zamudio MD at [facility] (On license of [facility])      INCONTINENCE SURGERY      KNEE SURGERY      SPINAL CORD STIMULATOR IMPLANTATION N/A 2019    STAGE 1 / INSERTION OF INTERSTIM DEVICE performed by Julieth Leach MD at [facility] N/A 2019    STAGE II STIMULATOR INSERTION performed by Julieth Leach MD at [facility] N/A 2021    INTERSTIM REMOVAL performed by Reina Brunner MD at [address] [place] [address] Drive History   Problem Relation Age of Onset    Rheum Arthritis Mother     Thyroid Disease Mother     Colon Polyps Mother         esophageal polyps    Other Mother     High Blood Pressure Father     Diabetes Father     High Blood Pressure Sister     Thyroid Disease Brother     High Blood Pressure Brother     Liver Cancer Maternal Uncle     Cancer Maternal Grandfather         pancreatic cancer    Colon Cancer Neg Hx      Social History     Tobacco Use    Smoking status: Former     Packs/day: 0.25     Years: 20.00     Pack years: 5.00     Types: Cigarettes     Start date:      Quit date:      Years since quittin.6    Smokeless tobacco: Never    Tobacco comments:     1 pack per week intermit for 20

## 2023-08-23 ENCOUNTER — HOSPITAL ENCOUNTER (EMERGENCY)
Age: 58
Discharge: HOME | End: 2023-08-23
Payer: MEDICARE

## 2023-08-23 VITALS
HEART RATE: 78 BPM | TEMPERATURE: 98.3 F | SYSTOLIC BLOOD PRESSURE: 159 MMHG | DIASTOLIC BLOOD PRESSURE: 93 MMHG | RESPIRATION RATE: 18 BRPM | OXYGEN SATURATION: 99 %
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
## 2023-08-23 PROCEDURE — 99213 OFFICE O/P EST LOW 20 MIN: CPT | Performed by: NURSE PRACTITIONER

## 2023-08-23 PROCEDURE — 99213 OFFICE O/P EST LOW 20 MIN: CPT

## 2023-08-23 RX ORDER — DIPHENHYDRAMINE HCL 25 MG
25 CAPSULE ORAL EVERY 6 HOURS PRN
Refills: 0 | COMMUNITY
Start: 2023-08-23 | End: 2023-09-02

## 2023-08-23 ASSESSMENT — ENCOUNTER SYMPTOMS
SHORTNESS OF BREATH: 0
COUGH: 0
WHEEZING: 0
CHOKING: 0
COLOR CHANGE: 1
APNEA: 0
CHEST TIGHTNESS: 0
STRIDOR: 0

## 2023-08-23 ASSESSMENT — PAIN - FUNCTIONAL ASSESSMENT: PAIN_FUNCTIONAL_ASSESSMENT: NONE - DENIES PAIN

## 2023-08-23 NOTE — ED PROVIDER NOTES
[address]  Urgent Care Encounter      CHIEF COMPLAINT     No chief complaint on file. Nurses Notes reviewed and I agree except as noted in the HPI. HISTORY OFPRESENT ILLNESS   Forilan Farmer is a 66 y.o. The history is provided by the patient. No  was used. Animal Bite  Contact animal:  Insect  Location:  Torso, pelvis and shoulder/arm  Shoulder/arm injury location:  R upper arm  Torso injury location:  L flank  Pelvic injury location:  Groin  Pain details:     Quality:  Itching    Severity:  Mild    Timing:  Constant    Progression:  Worsening  Incident location:  Unable to specify (riding ouside last week, new cough and pillows at home)  Provoked: unprovoked    Notifications:  None  Animal's rabies vaccination status:  Unknown  Animal in possession: no    Tetanus status:  Unknown  Relieved by:  Nothing  Worsened by: Activity  Ineffective treatments:  None tried  Associated symptoms: no fever, no numbness, no rash and no swelling      REVIEW OF SYSTEMS     Review of Systems   Constitutional:  Negative for activity change, appetite change, chills, diaphoresis, fatigue and fever. Respiratory:  Negative for apnea, cough, choking, chest tightness, shortness of breath, wheezing and stridor. Cardiovascular:  Negative for chest pain, palpitations and leg swelling. Skin:  Positive for color change. Negative for rash. Neurological:  Negative for numbness.      PAST MEDICAL HISTORY         Diagnosis Date    Anxiety     Asthma     Autoimmune disease ([address])     Bipolar 1 disorder ([address])     Blood in urine     Bronchitis     Cataracts, bilateral     Degenerative cervical disc     Degenerative disc disease     Depression     Hypertension     Hypothyroidism     Interstitial cystitis     Iritis 2013    OU      Migraines     Osteopenia     PONV (postoperative nausea and vomiting)     Shortness of breath     Thyroid disease        SURGICAL HISTORY     Patient  has

## 2023-08-30 ENCOUNTER — TELEPHONE (OUTPATIENT)
Dept: FAMILY MEDICINE CLINIC | Age: 58
End: 2023-08-30

## 2023-09-05 ENCOUNTER — OFFICE VISIT (OUTPATIENT)
Dept: FAMILY MEDICINE CLINIC | Age: 58
End: 2023-09-05
Payer: MEDICARE

## 2023-09-05 VITALS
BODY MASS INDEX: 25.68 KG/M2 | OXYGEN SATURATION: 98 % | HEART RATE: 76 BPM | SYSTOLIC BLOOD PRESSURE: 172 MMHG | RESPIRATION RATE: 16 BRPM | HEIGHT: 61 IN | WEIGHT: 136 LBS | DIASTOLIC BLOOD PRESSURE: 100 MMHG

## 2023-09-05 DIAGNOSIS — F33.2 SEVERE EPISODE OF RECURRENT MAJOR DEPRESSIVE DISORDER, WITHOUT PSYCHOTIC FEATURES (HCC): Primary | ICD-10-CM

## 2023-09-05 PROCEDURE — G8419 CALC BMI OUT NRM PARAM NOF/U: HCPCS | Performed by: NURSE PRACTITIONER

## 2023-09-05 PROCEDURE — 3017F COLORECTAL CA SCREEN DOC REV: CPT | Performed by: NURSE PRACTITIONER

## 2023-09-05 PROCEDURE — 3078F DIAST BP <80 MM HG: CPT | Performed by: NURSE PRACTITIONER

## 2023-09-05 PROCEDURE — G8427 DOCREV CUR MEDS BY ELIG CLIN: HCPCS | Performed by: NURSE PRACTITIONER

## 2023-09-05 PROCEDURE — 1036F TOBACCO NON-USER: CPT | Performed by: NURSE PRACTITIONER

## 2023-09-05 PROCEDURE — 99213 OFFICE O/P EST LOW 20 MIN: CPT | Performed by: NURSE PRACTITIONER

## 2023-09-05 PROCEDURE — 3074F SYST BP LT 130 MM HG: CPT | Performed by: NURSE PRACTITIONER

## 2023-09-06 ASSESSMENT — ENCOUNTER SYMPTOMS
ABDOMINAL DISTENTION: 0
COUGH: 0
BACK PAIN: 1
CHEST TIGHTNESS: 0
SHORTNESS OF BREATH: 0
ABDOMINAL PAIN: 0
WHEEZING: 0

## 2023-09-06 NOTE — PROGRESS NOTES
Packs/day: 0.25     Years: 20.00     Pack years: 5.00     Types: Cigarettes     Start date:      Quit date:      Years since quittin.6    Smokeless tobacco: Never    Tobacco comments:     1 pack per week intermit for 20 yrs   Substance Use Topics    Alcohol use: No     Alcohol/week: 0.0 standard drinks      Current Outpatient Medications   Medication Sig Dispense Refill    diazePAM (VALIUM) 5 MG tablet Take 1 tablet by mouth every 8 hours as needed for Anxiety or Sleep (spasms). 90 tablet 0    levothyroxine (SYNTHROID) 100 MCG tablet Take 1 tablet by mouth daily 90 tablet 3    lamoTRIgine (LAMICTAL) 200 MG tablet Take 1 tablet by mouth daily 90 tablet 0    buPROPion (WELLBUTRIN XL) 150 MG extended release tablet Take 3 tablets by mouth every morning 270 tablet 0    albuterol sulfate HFA (PROAIR HFA) 108 (90 Base) MCG/ACT inhaler Inhale 2 puffs into the lungs every 6 hours as needed for Wheezing 1 each 3    cyclobenzaprine (FLEXERIL) 10 MG tablet Take 1 tablet by mouth nightly as needed for Muscle spasms 30 tablet 1    polyethylene glycol (GLYCOLAX) 17 GM/SCOOP powder MIX 17 GRAMS OF POWDER IN FLUID AND DRINK ONCE DAILY 578 g 3    Probiotic Product (PROBIOTIC PO) Take by mouth daily       No current facility-administered medications for this visit.      Allergies   Allergen Reactions    Nurtec [Rimegepant Sulfate] Other (See Comments)     Makes her jittery    Abilify [Aripiprazole] Other (See Comments)     Vivid dreams    Cephalexin Nausea And Vomiting    Codeine Nausea And Vomiting    Contrast [Iodides] Nausea And Vomiting     Pt stated she felt very sick to her stomach    Excedrin Migraine [Aspirin-Acetaminophen-Caffeine] Nausea And Vomiting    Neurontin [Gabapentin] Anxiety    Nucynta [Tapentadol Hcl Er] Other (See Comments)     Terrible headache    Percocet [Oxycodone-Acetaminophen] Nausea And Vomiting    Tramadol Nausea And Vomiting    Tylenol With Codeine #3 [Acetaminophen-Codeine]      Nausea

## 2023-10-04 ENCOUNTER — OFFICE VISIT (OUTPATIENT)
Dept: PSYCHIATRY | Age: 58
End: 2023-10-04
Payer: MEDICARE

## 2023-10-04 DIAGNOSIS — F31.81 BIPOLAR II DISORDER (HCC): Primary | ICD-10-CM

## 2023-10-04 DIAGNOSIS — F41.1 GAD (GENERALIZED ANXIETY DISORDER): ICD-10-CM

## 2023-10-04 PROCEDURE — 99214 OFFICE O/P EST MOD 30 MIN: CPT | Performed by: NURSE PRACTITIONER

## 2023-10-04 PROCEDURE — G8484 FLU IMMUNIZE NO ADMIN: HCPCS | Performed by: NURSE PRACTITIONER

## 2023-10-04 PROCEDURE — 3017F COLORECTAL CA SCREEN DOC REV: CPT | Performed by: NURSE PRACTITIONER

## 2023-10-04 PROCEDURE — G8428 CUR MEDS NOT DOCUMENT: HCPCS | Performed by: NURSE PRACTITIONER

## 2023-10-04 PROCEDURE — G8419 CALC BMI OUT NRM PARAM NOF/U: HCPCS | Performed by: NURSE PRACTITIONER

## 2023-10-04 PROCEDURE — 1036F TOBACCO NON-USER: CPT | Performed by: NURSE PRACTITIONER

## 2023-10-04 RX ORDER — LAMOTRIGINE 200 MG/1
200 TABLET ORAL DAILY
Qty: 90 TABLET | Refills: 0 | Status: SHIPPED | OUTPATIENT
Start: 2023-10-04

## 2023-10-04 RX ORDER — BUPROPION HYDROCHLORIDE 150 MG/1
450 TABLET ORAL EVERY MORNING
Qty: 270 TABLET | Refills: 0 | Status: SHIPPED | OUTPATIENT
Start: 2023-10-04

## 2023-10-04 NOTE — PROGRESS NOTES
Progress Note     10-4-2023                                                                         CC: Bipolar II D/O;  ZOE     HPI  Pablo Cary is seen in person for follow up and  medication management. States her meds are working [facility] . Denies having side effects. .Denies having a rash. Good med compliance continues to be reported. Denies having depression or anxiety. Denies feelings of harm towards self or others. Reports countinues to deal with  pain and reports is still her primary issue. Reports appetite is good but sleep is still fair due to pain. States she still spends time with her grandchildren. Reports has been going on mission trips with [employer]. Labs CBC, CMP, Lipids, TSH, reviewed drawn 5-15-23. No critical abnormals noted. Reports still  being on Valium Rxed by PCP for anxiety and sleep. Client advised again Valium would not be Rxed from this office. Pt states she wants counseling but needs sooner than this office can provide. States eill try and make appt Dr Bhavana Mitchell, Psychologist.      Past Medical Hx:  Reports allergies to Tramadol. Codeine, Percocets, Vicodin  Reports having Hypothyroidism, Migraine Headaches, HTN. DDD, Asthma, Osteoarthritis  Reports partial hysterectomy 2001     Past Psychiatric Hx:  Denies any past psych hospitalizations. Denies any past suicidal gestures. Denies ever being under care of psychiatrist. Reports receives counseling at [facility]. Currently xanax Rxed by PCP and Valium Rxed by PCP. 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 MARITAL STATUS: Currently  2nd marriage. Reports relationship is good. OCCUPATION: Currently on Disability.  Last worked at [employer] left there 2015  [address]: Reports having associates degree in [place]

## 2023-10-17 DIAGNOSIS — F33.41 RECURRENT MAJOR DEPRESSIVE DISORDER, IN PARTIAL REMISSION (HCC): ICD-10-CM

## 2023-10-17 DIAGNOSIS — M96.1 FAILED NECK SYNDROME: ICD-10-CM

## 2023-10-17 RX ORDER — ALBUTEROL SULFATE 90 UG/1
2 AEROSOL, METERED RESPIRATORY (INHALATION) EVERY 6 HOURS PRN
Qty: 1 EACH | Refills: 3 | Status: SHIPPED | OUTPATIENT
Start: 2023-10-17

## 2023-10-17 RX ORDER — DIAZEPAM 5 MG/1
5 TABLET ORAL EVERY 8 HOURS PRN
Qty: 90 TABLET | Refills: 0 | Status: SHIPPED | OUTPATIENT
Start: 2023-10-17 | End: 2024-10-16

## 2023-12-19 ENCOUNTER — HOSPITAL ENCOUNTER (EMERGENCY)
Age: 58
Discharge: HOME | End: 2023-12-19
Payer: MEDICARE

## 2023-12-19 VITALS
OXYGEN SATURATION: 98 % | TEMPERATURE: 97.8 F | DIASTOLIC BLOOD PRESSURE: 96 MMHG | RESPIRATION RATE: 18 BRPM | HEIGHT: 60 IN | SYSTOLIC BLOOD PRESSURE: 134 MMHG | BODY MASS INDEX: 27.09 KG/M2 | HEART RATE: 103 BPM | WEIGHT: 138 LBS

## 2023-12-19 DIAGNOSIS — Z20.822 COVID-19 RULED OUT BY LABORATORY TESTING: ICD-10-CM

## 2023-12-19 DIAGNOSIS — R89.4 INFLUENZA A VIRUS NOT DETECTED: ICD-10-CM

## 2023-12-19 DIAGNOSIS — H66.93 BILATERAL OTITIS MEDIA, UNSPECIFIED OTITIS MEDIA TYPE: Primary | ICD-10-CM

## 2023-12-19 LAB
FLUAV AG SPEC QL: NEGATIVE
FLUBV AG SPEC QL: NEGATIVE
SARS-COV-2 RDRP RESP QL NAA+PROBE: NOT  DETECTED

## 2023-12-19 PROCEDURE — 99213 OFFICE O/P EST LOW 20 MIN: CPT

## 2023-12-19 PROCEDURE — 87635 SARS-COV-2 COVID-19 AMP PRB: CPT

## 2023-12-19 PROCEDURE — 6370000000 HC RX 637 (ALT 250 FOR IP): Performed by: NURSE PRACTITIONER

## 2023-12-19 PROCEDURE — 99213 OFFICE O/P EST LOW 20 MIN: CPT | Performed by: NURSE PRACTITIONER

## 2023-12-19 PROCEDURE — 87804 INFLUENZA ASSAY W/OPTIC: CPT

## 2023-12-19 RX ORDER — AZITHROMYCIN 250 MG/1
TABLET, FILM COATED ORAL
Qty: 1 PACKET | Refills: 0 | Status: SHIPPED | OUTPATIENT
Start: 2023-12-19 | End: 2023-12-23

## 2023-12-19 RX ORDER — ACETAMINOPHEN 325 MG/1
650 TABLET ORAL ONCE
Status: COMPLETED | OUTPATIENT
Start: 2023-12-19 | End: 2023-12-19

## 2023-12-19 RX ORDER — CETIRIZINE HYDROCHLORIDE 10 MG/1
10 TABLET ORAL DAILY
Qty: 30 TABLET | Refills: 0 | Status: SHIPPED | OUTPATIENT
Start: 2023-12-19 | End: 2024-01-18

## 2023-12-19 RX ADMIN — ACETAMINOPHEN 650 MG: 325 TABLET ORAL at 12:10

## 2023-12-19 ASSESSMENT — PAIN DESCRIPTION - DESCRIPTORS: DESCRIPTORS: DULL

## 2023-12-19 ASSESSMENT — PAIN - FUNCTIONAL ASSESSMENT: PAIN_FUNCTIONAL_ASSESSMENT: 0-10

## 2023-12-19 ASSESSMENT — PAIN DESCRIPTION - LOCATION: LOCATION: GENERALIZED;HEAD

## 2023-12-19 ASSESSMENT — PAIN SCALES - GENERAL: PAINLEVEL_OUTOF10: 8

## 2023-12-19 NOTE — ED NOTES
Pt with complaints of a cough, nasal congestion, diarrhea, body aches, vomiting and loss of taste and smell that started on 12/14. States she has tried decongestants but they have not helped. States she has had an exposure to strep. Denies any sore throat.      Alireza Louis LPN  03/55/80 [id number]

## 2023-12-19 NOTE — ED PROVIDER NOTES
[facility]  Urgent Care Encounter      CHIEF COMPLAINT       Chief Complaint   Patient presents with    Cough    Nasal Congestion    Diarrhea    Generalized Body Aches    Emesis       Nurses Notes reviewed and I agree except as noted in the HPI. HISTORY OFPRESENT ILLNESS   Leeann Ramirez is a 66 y.o. The history is provided by the patient. No  was used. URI  Presenting symptoms: congestion, cough, ear pain and rhinorrhea    Presenting symptoms: no facial pain, no fatigue, no fever and no sore throat    Severity:  Severe  Onset quality:  Sudden  Duration:  2 days  Timing:  Constant  Progression:  Unchanged  Chronicity:  New  Relieved by:  Nothing  Worsened by:  Certain positions  Ineffective treatments:  OTC medications  Associated symptoms: headaches    Associated symptoms: no arthralgias, no myalgias, no neck pain, no sinus pain, no sneezing, no swollen glands and no wheezing    Risk factors: sick contacts    Risk factors: not elderly, no chronic cardiac disease, no chronic kidney disease, no chronic respiratory disease, no diabetes mellitus, no immunosuppression, no recent illness and no recent travel        REVIEW OF SYSTEMS     Review of Systems   Constitutional:  Positive for activity change and appetite change. Negative for chills, diaphoresis, fatigue and fever. HENT:  Positive for congestion, ear pain, postnasal drip and rhinorrhea. Negative for sinus pain, sneezing and sore throat. Respiratory:  Positive for cough. Negative for apnea, choking, chest tightness, shortness of breath, wheezing and stridor. Cardiovascular:  Negative for chest pain, palpitations and leg swelling. Musculoskeletal:  Negative for arthralgias, myalgias and neck pain. Neurological:  Positive for headaches.        PAST MEDICAL HISTORY         Diagnosis Date    Anxiety     Asthma     Autoimmune disease ([address])     Bipolar 1 disorder ([address])     Blood in urine     Bronchitis oropharyngeal erythema.   Eyes:      Extraocular Movements: Extraocular movements intact.   Pulmonary:      Effort: Pulmonary effort is normal. No respiratory distress.      Breath sounds: Normal breath sounds. No stridor. No wheezing, rhonchi or rales.   Chest:      Chest wall: No tenderness.   Musculoskeletal:         General: Normal range of motion.      Cervical back: Normal range of motion.   Skin:     General: Skin is warm.   Neurological:      General: No focal deficit present.      Mental Status: She is alert and oriented to person, place, and time.   Psychiatric:         Mood and Affect: Mood normal.         Behavior: Behavior normal. Behavior is cooperative.         Thought Content: Thought content normal.         Judgment: Judgment normal.         DIAGNOSTIC RESULTS   Labs:  Results for orders placed or performed during the hospital encounter of 12/19/23   COVID-19, Rapid   Result Value Ref Range    SARS-CoV-2, ATIYA NOT  DETECTED NOT DETECTED   Rapid influenza A/B antigens   Result Value Ref Range    Flu A Antigen Negative NEGATIVE    Influenza B Ag, EIA Negative NEGATIVE       IMAGING:  No orders to display     URGENT CARE COURSE:     Vitals:    12/19/23 1148   BP: (!) 134/96   Pulse: (!) 103   Resp: 18   Temp: 97.8 °F (36.6 °C)   TempSrc: Temporal   SpO2: 98%   Weight: 62.6 kg (138 lb)   Height: 1.524 m (5')       Medications   acetaminophen (TYLENOL) tablet 650 mg (650 mg Oral Given 12/19/23 1210)     PROCEDURES:  None  FINAL IMPRESSION      1. Bilateral otitis media, unspecified otitis media type    2. COVID-19 ruled out by laboratory testing    3. Influenza A virus not detected        DISPOSITION/PLAN   Decision To Discharge     The parent or patient representative was advised that at this point the patient can be treated safely at home, the parent or Patient representative should be aware of following interventions and  advised to the watch for the following:  #1.  Any increasing pain not controlled

## 2024-01-08 NOTE — TELEPHONE ENCOUNTER
Patient called in requesting a refill of the following medication refills:    Patient stated she ran out on 01/06/24.    Bupropion 150mg Extended Release  #270 with no refills to the [facility] on [address]. Previous Rx sent on 10/04/23 for #270 with no refills.    Lamotrigine 200mg #90 with no refills to the [facility] on [address]. Previous Rx sent 10/04/23 for #90 with no refills.     Last visit 10/04/23 (No Show for 01/03/23 visit )  Next visit 01/31/24    Pending your approval.

## 2024-01-08 NOTE — TELEPHONE ENCOUNTER
Per provider Lamotrigine and Bupropion called into the [facility] on [address] at 09:28 on 01/08/24 for a 30 day supply. Patient notified that she must attend her next appointment on 01/31/24. Patient verbally understood.    Rx pending as phoned in.

## 2024-01-10 RX ORDER — LAMOTRIGINE 200 MG/1
200 TABLET ORAL DAILY
Qty: 30 TABLET | Refills: 0 | OUTPATIENT
Start: 2024-01-10

## 2024-01-10 RX ORDER — BUPROPION HYDROCHLORIDE 150 MG/1
450 TABLET ORAL EVERY MORNING
Qty: 90 TABLET | Refills: 0 | OUTPATIENT
Start: 2024-01-10

## 2024-01-17 ENCOUNTER — TELEPHONE (OUTPATIENT)
Dept: FAMILY MEDICINE CLINIC | Age: 59
End: 2024-01-17

## 2024-01-17 DIAGNOSIS — F33.41 RECURRENT MAJOR DEPRESSIVE DISORDER, IN PARTIAL REMISSION (HCC): ICD-10-CM

## 2024-01-17 DIAGNOSIS — M96.1 FAILED NECK SYNDROME: ICD-10-CM

## 2024-01-17 NOTE — TELEPHONE ENCOUNTER
----- Message from Radha Layton sent at 1/17/2024  1:28 PM EST -----  Subject: Refill Request    QUESTIONS  Name of Medication? diazePAM (VALIUM) 5 MG tablet  Patient-reported dosage and instructions? as needed  How many days do you have left? 0  Preferred Pharmacy? [facility]  Pharmacy phone number (if available)? [phone]  ---------------------------------------------------------------------------  --------------  CALL BACK INFO  What is the best way for the office to contact you? OK to leave message on   voicemail  Preferred Call Back Phone Number? [phone]  ---------------------------------------------------------------------------  --------------  SCRIPT ANSWERS  Relationship to Patient? Self

## 2024-01-22 ENCOUNTER — OFFICE VISIT (OUTPATIENT)
Dept: FAMILY MEDICINE CLINIC | Age: 59
End: 2024-01-22
Payer: MEDICARE

## 2024-01-22 VITALS
BODY MASS INDEX: 28.08 KG/M2 | WEIGHT: 143.8 LBS | SYSTOLIC BLOOD PRESSURE: 130 MMHG | HEART RATE: 75 BPM | OXYGEN SATURATION: 99 % | DIASTOLIC BLOOD PRESSURE: 80 MMHG | TEMPERATURE: 98.7 F

## 2024-01-22 DIAGNOSIS — F33.2 SEVERE EPISODE OF RECURRENT MAJOR DEPRESSIVE DISORDER, WITHOUT PSYCHOTIC FEATURES (HCC): ICD-10-CM

## 2024-01-22 DIAGNOSIS — N30.10 IC (INTERSTITIAL CYSTITIS): ICD-10-CM

## 2024-01-22 DIAGNOSIS — G44.86 CERVICOGENIC HEADACHE: ICD-10-CM

## 2024-01-22 DIAGNOSIS — F33.41 RECURRENT MAJOR DEPRESSIVE DISORDER, IN PARTIAL REMISSION (HCC): ICD-10-CM

## 2024-01-22 DIAGNOSIS — Z12.31 ENCOUNTER FOR SCREENING MAMMOGRAM FOR MALIGNANT NEOPLASM OF BREAST: ICD-10-CM

## 2024-01-22 DIAGNOSIS — M96.1 FAILED NECK SYNDROME: Primary | ICD-10-CM

## 2024-01-22 DIAGNOSIS — E03.9 HYPOTHYROIDISM, UNSPECIFIED TYPE: ICD-10-CM

## 2024-01-22 DIAGNOSIS — J45.20 MILD INTERMITTENT REACTIVE AIRWAY DISEASE WITHOUT COMPLICATION: ICD-10-CM

## 2024-01-22 DIAGNOSIS — R35.0 URINARY FREQUENCY: ICD-10-CM

## 2024-01-22 DIAGNOSIS — F41.9 ANXIETY: ICD-10-CM

## 2024-01-22 DIAGNOSIS — F31.70 BIPOLAR DISORDER IN PARTIAL REMISSION, MOST RECENT EPISODE UNSPECIFIED TYPE (HCC): ICD-10-CM

## 2024-01-22 LAB
BILIRUBIN, POC: NORMAL
BLOOD URINE, POC: NORMAL
CLARITY, POC: CLEAR
COLOR, POC: YELLOW
GLUCOSE URINE, POC: NORMAL
KETONES, POC: NORMAL
LEUKOCYTE EST, POC: NORMAL
NITRITE, POC: NORMAL
PH, POC: 6
PROTEIN, POC: NORMAL
SPECIFIC GRAVITY, POC: >=1.03
UROBILINOGEN, POC: 0.2

## 2024-01-22 PROCEDURE — 99214 OFFICE O/P EST MOD 30 MIN: CPT | Performed by: NURSE PRACTITIONER

## 2024-01-22 PROCEDURE — 3079F DIAST BP 80-89 MM HG: CPT | Performed by: NURSE PRACTITIONER

## 2024-01-22 PROCEDURE — G8427 DOCREV CUR MEDS BY ELIG CLIN: HCPCS | Performed by: NURSE PRACTITIONER

## 2024-01-22 PROCEDURE — G8484 FLU IMMUNIZE NO ADMIN: HCPCS | Performed by: NURSE PRACTITIONER

## 2024-01-22 PROCEDURE — G8419 CALC BMI OUT NRM PARAM NOF/U: HCPCS | Performed by: NURSE PRACTITIONER

## 2024-01-22 PROCEDURE — 1036F TOBACCO NON-USER: CPT | Performed by: NURSE PRACTITIONER

## 2024-01-22 PROCEDURE — 3075F SYST BP GE 130 - 139MM HG: CPT | Performed by: NURSE PRACTITIONER

## 2024-01-22 PROCEDURE — 3017F COLORECTAL CA SCREEN DOC REV: CPT | Performed by: NURSE PRACTITIONER

## 2024-01-22 PROCEDURE — 81003 URINALYSIS AUTO W/O SCOPE: CPT | Performed by: NURSE PRACTITIONER

## 2024-01-22 RX ORDER — CIPROFLOXACIN 500 MG/1
500 TABLET, FILM COATED ORAL 2 TIMES DAILY
Qty: 14 TABLET | Refills: 0 | Status: SHIPPED | OUTPATIENT
Start: 2024-01-22 | End: 2024-01-29

## 2024-01-22 RX ORDER — DIAZEPAM 5 MG/1
5 TABLET ORAL EVERY 8 HOURS PRN
Qty: 90 TABLET | Refills: 0 | Status: SHIPPED | OUTPATIENT
Start: 2024-01-22 | End: 2025-01-21

## 2024-01-22 ASSESSMENT — ENCOUNTER SYMPTOMS
ABDOMINAL DISTENTION: 0
COUGH: 0
SHORTNESS OF BREATH: 0
BACK PAIN: 1
WHEEZING: 0
CHEST TIGHTNESS: 0
ABDOMINAL PAIN: 0

## 2024-01-22 ASSESSMENT — PATIENT HEALTH QUESTIONNAIRE - PHQ9
8. MOVING OR SPEAKING SO SLOWLY THAT OTHER PEOPLE COULD HAVE NOTICED. OR THE OPPOSITE, BEING SO FIGETY OR RESTLESS THAT YOU HAVE BEEN MOVING AROUND A LOT MORE THAN USUAL: 0
2. FEELING DOWN, DEPRESSED OR HOPELESS: 1
SUM OF ALL RESPONSES TO PHQ QUESTIONS 1-9: 10
SUM OF ALL RESPONSES TO PHQ QUESTIONS 1-9: 10
3. TROUBLE FALLING OR STAYING ASLEEP: 3
4. FEELING TIRED OR HAVING LITTLE ENERGY: 1
9. THOUGHTS THAT YOU WOULD BE BETTER OFF DEAD, OR OF HURTING YOURSELF: 0
SUM OF ALL RESPONSES TO PHQ QUESTIONS 1-9: 10
5. POOR APPETITE OR OVEREATING: 3
SUM OF ALL RESPONSES TO PHQ9 QUESTIONS 1 & 2: 3
10. IF YOU CHECKED OFF ANY PROBLEMS, HOW DIFFICULT HAVE THESE PROBLEMS MADE IT FOR YOU TO DO YOUR WORK, TAKE CARE OF THINGS AT HOME, OR GET ALONG WITH OTHER PEOPLE: 0
7. TROUBLE CONCENTRATING ON THINGS, SUCH AS READING THE NEWSPAPER OR WATCHING TELEVISION: 0
SUM OF ALL RESPONSES TO PHQ QUESTIONS 1-9: 10
1. LITTLE INTEREST OR PLEASURE IN DOING THINGS: 2

## 2024-01-22 NOTE — PROGRESS NOTES
motion.      Lumbar back: Tenderness and bony tenderness present. Decreased range of motion.   Skin:     General: Skin is warm and dry.   Neurological:      General: No focal deficit present.      Mental Status: She is alert and oriented to person, place, and time.   Psychiatric:         Mood and Affect: Mood normal.         Thought Content: Thought content normal.         Judgment: Judgment normal.       /80 (Site: Right Upper Arm)   Pulse 75   Temp 98.7 °F (37.1 °C) (Oral)   Wt 65.2 kg (143 lb 12.8 oz)   SpO2 99%   BMI 28.08 kg/m²       Impression/Plan:  1. Failed neck syndrome    2. Recurrent major depressive disorder, in partial remission ([facility])    3. Severe episode of recurrent major depressive disorder, without psychotic features ([facility])    4. IC (interstitial cystitis)    5. Anxiety    6. Bipolar disorder in partial remission, most recent episode unspecified type ([facility])    7. Cervicogenic headache    8. Hypothyroidism, unspecified type    9. Mild intermittent reactive airway disease without complication    10. Encounter for screening mammogram for malignant neoplasm of breast    11. Urinary frequency      Requested Prescriptions     Signed Prescriptions Disp Refills    diazePAM (VALIUM) 5 MG tablet 90 tablet 0     Sig: Take 1 tablet by mouth every 8 hours as needed for Anxiety or Sleep (spasms).    ciprofloxacin (CIPRO) 500 MG tablet 14 tablet 0     Sig: Take 1 tablet by mouth 2 times daily for 7 days     Orders Placed This Encounter   Procedures    CHRISTIANO DIGITAL SCREEN W OR WO CAD BILATERAL     Standing Status:   Future     Standing Expiration Date:   1/21/2025    POCT Urinalysis No Micro (Auto)     Mammogram ordered Valium refilled 5 mg every 8 #90 OARRS report reviewed.  Cipro 500 twice daily for 7 days.  D-mannose over-the-counter.  Letter provided stating patient able to work 2 hours daily limited capacity no standing climbing lifting.  Report reviewed  Patient giveneducational materials - see

## 2024-01-31 ENCOUNTER — OFFICE VISIT (OUTPATIENT)
Dept: PSYCHIATRY | Age: 59
End: 2024-01-31
Payer: MEDICARE

## 2024-01-31 DIAGNOSIS — F31.81 BIPOLAR II DISORDER (HCC): Primary | ICD-10-CM

## 2024-01-31 DIAGNOSIS — F41.1 GAD (GENERALIZED ANXIETY DISORDER): ICD-10-CM

## 2024-01-31 PROCEDURE — G8428 CUR MEDS NOT DOCUMENT: HCPCS | Performed by: NURSE PRACTITIONER

## 2024-01-31 PROCEDURE — G8419 CALC BMI OUT NRM PARAM NOF/U: HCPCS | Performed by: NURSE PRACTITIONER

## 2024-01-31 PROCEDURE — 1036F TOBACCO NON-USER: CPT | Performed by: NURSE PRACTITIONER

## 2024-01-31 PROCEDURE — 3017F COLORECTAL CA SCREEN DOC REV: CPT | Performed by: NURSE PRACTITIONER

## 2024-01-31 PROCEDURE — 99214 OFFICE O/P EST MOD 30 MIN: CPT | Performed by: NURSE PRACTITIONER

## 2024-01-31 PROCEDURE — G8484 FLU IMMUNIZE NO ADMIN: HCPCS | Performed by: NURSE PRACTITIONER

## 2024-01-31 RX ORDER — LAMOTRIGINE 200 MG/1
200 TABLET ORAL DAILY
Qty: 30 TABLET | Refills: 3 | Status: SHIPPED | OUTPATIENT
Start: 2024-01-31

## 2024-01-31 RX ORDER — BUPROPION HYDROCHLORIDE 150 MG/1
450 TABLET ORAL EVERY MORNING
Qty: 90 TABLET | Refills: 3 | Status: SHIPPED | OUTPATIENT
Start: 2024-01-31

## 2024-01-31 NOTE — PROGRESS NOTES
Progress Note      1-                                                                         CC: Bipolar II D/O;  ZOE     HPI  Anastasiya is seen in person for follow up and  medication management.  Reports her meds are continuing to work well.  Denies having side effects. .Denies having a rash. Good med compliance continues to be reported. Denies having depression or anxiety.  Denies feelings of harm towards self or others. Reports still dealing with with  pain and  is still her primary issue. Reports still eating well. Reports sleep is still fair due to pain. States she still spends time with her grandchildren. Reports has  mission trips with planned [employer]. Reports was unable to obtain counseling and will just use God as he comfort.  Labs CBC, CMP, Lipids, TSH, reviewed drawn 5-15-23.  No critical abnormals noted.   Reports still  being on Valium Rxed by PCP for anxiety and sleep. Client advised again Valium would not be Rxed from this office. Pt states she wants counseling but needs sooner than this office can provide.      Past Medical Hx:  Reports allergies to Tramadol. Codeine, Percocets, Vicodin  Reports having Hypothyroidism, Migraine Headaches, HTN. DDD, Asthma, Osteoarthritis  Reports partial hysterectomy 2001     Past Psychiatric Hx:  Denies any past psych hospitalizations. Denies any past suicidal gestures. Denies ever being under care of psychiatrist. Reports receives counseling at [facility] . Currently xanax Rxed by PCP and Valium Rxed by PCP. 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  MARITAL STATUS: Currently  2nd marriage. Reports relationship is good.  OCCUPATION: Currently on Disability. Last worked at [employer] left there 2015  LEVEL OF EDUCATION: Reports having

## 2024-02-27 ENCOUNTER — HOSPITAL ENCOUNTER (EMERGENCY)
Age: 59
Discharge: HOME | End: 2024-02-27
Payer: MEDICARE

## 2024-02-27 VITALS
TEMPERATURE: 99.7 F | HEART RATE: 100 BPM | HEIGHT: 60 IN | BODY MASS INDEX: 25.13 KG/M2 | WEIGHT: 128 LBS | DIASTOLIC BLOOD PRESSURE: 79 MMHG | SYSTOLIC BLOOD PRESSURE: 132 MMHG | OXYGEN SATURATION: 96 % | RESPIRATION RATE: 16 BRPM

## 2024-02-27 DIAGNOSIS — J01.10 ACUTE NON-RECURRENT FRONTAL SINUSITIS: Primary | ICD-10-CM

## 2024-02-27 PROCEDURE — 99213 OFFICE O/P EST LOW 20 MIN: CPT | Performed by: NURSE PRACTITIONER

## 2024-02-27 PROCEDURE — 99213 OFFICE O/P EST LOW 20 MIN: CPT

## 2024-02-27 RX ORDER — FLUTICASONE PROPIONATE 50 MCG
2 SPRAY, SUSPENSION (ML) NASAL DAILY
Qty: 16 G | Refills: 0 | Status: SHIPPED | OUTPATIENT
Start: 2024-02-27

## 2024-02-27 RX ORDER — AZITHROMYCIN 250 MG/1
TABLET, FILM COATED ORAL
Qty: 1 PACKET | Refills: 0 | Status: SHIPPED | OUTPATIENT
Start: 2024-02-27 | End: 2024-03-02

## 2024-02-27 ASSESSMENT — PAIN DESCRIPTION - DESCRIPTORS: DESCRIPTORS: PRESSURE

## 2024-02-27 ASSESSMENT — ENCOUNTER SYMPTOMS
COUGH: 1
SINUS PRESSURE: 1
SHORTNESS OF BREATH: 0
SINUS PAIN: 1
SORE THROAT: 0

## 2024-02-27 ASSESSMENT — PAIN SCALES - GENERAL: PAINLEVEL_OUTOF10: 9

## 2024-02-27 ASSESSMENT — PAIN DESCRIPTION - FREQUENCY: FREQUENCY: CONTINUOUS

## 2024-02-27 ASSESSMENT — PAIN DESCRIPTION - ONSET: ONSET: GRADUAL

## 2024-02-27 ASSESSMENT — PAIN DESCRIPTION - PAIN TYPE: TYPE: ACUTE PAIN

## 2024-02-27 ASSESSMENT — PAIN - FUNCTIONAL ASSESSMENT
PAIN_FUNCTIONAL_ASSESSMENT: 0-10
PAIN_FUNCTIONAL_ASSESSMENT: ACTIVITIES ARE NOT PREVENTED

## 2024-02-27 ASSESSMENT — PAIN DESCRIPTION - LOCATION: LOCATION: FACE

## 2024-02-27 NOTE — ED PROVIDER NOTES
[facility] URGENT CARE  Urgent Care Encounter       CHIEF COMPLAINT       Chief Complaint   Patient presents with    Facial Pain     pressure       Nurses Notes reviewed and I agree except as noted in the HPI.  HISTORY OF PRESENT ILLNESS   Anastasiya Gillespie is a 58 y.o. female who presents with complaints of sinus congestion, headache, pressure behind her eyes and pain.  This is a new problem that started 2 to 3 days ago.  She has tried [place] cold and flu, NyQuil, and an antihistamine.  The treatment has been ineffective.  She denies any fever.  No known exposure to illness.    The history is provided by the patient.       REVIEW OF SYSTEMS     Review of Systems   Constitutional:  Negative for fatigue and fever.   HENT:  Positive for congestion, sinus pressure and sinus pain. Negative for postnasal drip and sore throat.    Respiratory:  Positive for cough. Negative for shortness of breath.    Cardiovascular:  Negative for chest pain.   Musculoskeletal:  Negative for myalgias.   Neurological:  Positive for headaches.       PAST MEDICAL HISTORY         Diagnosis Date    Anxiety     Asthma     Autoimmune disease (HCC)     Bipolar 1 disorder (HCC)     Blood in urine     Bronchitis     Cataracts, bilateral     Degenerative cervical disc     Degenerative disc disease     Depression     Hypertension     Hypothyroidism     Interstitial cystitis     Iritis 2013    OU      Migraines     Osteopenia     PONV (postoperative nausea and vomiting)     Shortness of breath     Thyroid disease        SURGICALHISTORY     Patient  has a past surgical history that includes Hysterectomy (4/02); cervical fusion (11/09); Fulguration Minor Bladder Lesion (with o (2011); Incontinence surgery; Cystoscopy (N/A, 4/25/2019); back surgery; Pain Block (Bilateral, 11/6/2019); spinal cord stimulator implantation (N/A, 11/20/2019); cervical fusion (11/20/2015); spinal cord stimulator implantation (N/A, 12/4/2019); Cystoscopy  tobacco. She reports that she does not drink alcohol and does not use drugs.    PHYSICAL EXAM     ED TRIAGE VITALS  BP: 132/79, Temp: 99.7 °F (37.6 °C), Pulse: 100, Respirations: 16, SpO2: 96 %,Estimated body mass index is 25 kg/m² as calculated from the following:    Height as of this encounter: 1.524 m (5').    Weight as of this encounter: 58.1 kg (128 lb).,No LMP recorded. Patient has had a hysterectomy.    Physical Exam  Vitals and nursing note reviewed.   Constitutional:       General: She is not in acute distress.     Appearance: She is ill-appearing.   HENT:      Head: Normocephalic.      Right Ear: Tympanic membrane, ear canal and external ear normal.      Left Ear: Tympanic membrane, ear canal and external ear normal.      Nose: Congestion and rhinorrhea present.      Right Sinus: Frontal sinus tenderness present.      Left Sinus: Frontal sinus tenderness present.      Mouth/Throat:      Mouth: Mucous membranes are moist.      Pharynx: No posterior oropharyngeal erythema.   Cardiovascular:      Rate and Rhythm: Normal rate and regular rhythm.      Heart sounds: Normal heart sounds.   Pulmonary:      Effort: Pulmonary effort is normal.      Breath sounds: Normal breath sounds.   Lymphadenopathy:      Cervical: No cervical adenopathy.   Skin:     General: Skin is warm and dry.   Neurological:      Mental Status: She is alert and oriented to person, place, and time.       DIAGNOSTIC RESULTS     Labs:No results found for this visit on 02/27/24.    IMAGING:  None    EKG:  None    URGENT CARE COURSE:     Vitals:    02/27/24 0808   BP: 132/79   Pulse: 100   Resp: 16   Temp: 99.7 °F (37.6 °C)   TempSrc: Oral   SpO2: 96%   Weight: 58.1 kg (128 lb)   Height: 1.524 m (5')       Medications - No data to display       PROCEDURES:  None    FINAL IMPRESSION      1. Acute non-recurrent frontal sinusitis      DISPOSITION/ PLAN   DISPOSITION Decision To Discharge 02/27/2024 08:23:17 AM     Clinical exam consistent with acute

## 2024-03-20 ENCOUNTER — OFFICE VISIT (OUTPATIENT)
Dept: FAMILY MEDICINE CLINIC | Age: 59
End: 2024-03-20

## 2024-03-20 VITALS
DIASTOLIC BLOOD PRESSURE: 68 MMHG | BODY MASS INDEX: 27.54 KG/M2 | SYSTOLIC BLOOD PRESSURE: 134 MMHG | HEART RATE: 97 BPM | RESPIRATION RATE: 16 BRPM | WEIGHT: 141 LBS | TEMPERATURE: 98 F | OXYGEN SATURATION: 99 %

## 2024-03-20 DIAGNOSIS — R06.83 SNORING: Primary | ICD-10-CM

## 2024-03-20 DIAGNOSIS — M96.1 FAILED NECK SYNDROME: ICD-10-CM

## 2024-03-20 DIAGNOSIS — M46.98 ARTHRITIS OF COCCYX (HCC): ICD-10-CM

## 2024-03-20 DIAGNOSIS — I10 ESSENTIAL HYPERTENSION: ICD-10-CM

## 2024-03-20 DIAGNOSIS — F33.2 SEVERE EPISODE OF RECURRENT MAJOR DEPRESSIVE DISORDER, WITHOUT PSYCHOTIC FEATURES (HCC): ICD-10-CM

## 2024-03-20 DIAGNOSIS — F41.9 ANXIETY: ICD-10-CM

## 2024-03-20 DIAGNOSIS — G89.29 CHRONIC MIDLINE LOW BACK PAIN, UNSPECIFIED WHETHER SCIATICA PRESENT: ICD-10-CM

## 2024-03-20 DIAGNOSIS — G44.86 CERVICOGENIC HEADACHE: ICD-10-CM

## 2024-03-20 DIAGNOSIS — J45.20 MILD INTERMITTENT REACTIVE AIRWAY DISEASE WITHOUT COMPLICATION: ICD-10-CM

## 2024-03-20 DIAGNOSIS — M54.50 CHRONIC MIDLINE LOW BACK PAIN, UNSPECIFIED WHETHER SCIATICA PRESENT: ICD-10-CM

## 2024-03-20 DIAGNOSIS — F31.70 BIPOLAR DISORDER IN PARTIAL REMISSION, MOST RECENT EPISODE UNSPECIFIED TYPE (HCC): ICD-10-CM

## 2024-03-20 DIAGNOSIS — F33.41 RECURRENT MAJOR DEPRESSIVE DISORDER, IN PARTIAL REMISSION (HCC): ICD-10-CM

## 2024-03-20 RX ORDER — ALBUTEROL SULFATE 90 UG/1
2 AEROSOL, METERED RESPIRATORY (INHALATION) EVERY 6 HOURS PRN
Qty: 1 EACH | Refills: 3 | Status: SHIPPED | OUTPATIENT
Start: 2024-03-20

## 2024-03-20 SDOH — ECONOMIC STABILITY: FOOD INSECURITY: WITHIN THE PAST 12 MONTHS, YOU WORRIED THAT YOUR FOOD WOULD RUN OUT BEFORE YOU GOT MONEY TO BUY MORE.: NEVER TRUE

## 2024-03-20 SDOH — ECONOMIC STABILITY: FOOD INSECURITY: WITHIN THE PAST 12 MONTHS, THE FOOD YOU BOUGHT JUST DIDN'T LAST AND YOU DIDN'T HAVE MONEY TO GET MORE.: NEVER TRUE

## 2024-03-20 SDOH — ECONOMIC STABILITY: INCOME INSECURITY: HOW HARD IS IT FOR YOU TO PAY FOR THE VERY BASICS LIKE FOOD, HOUSING, MEDICAL CARE, AND HEATING?: NOT HARD AT ALL

## 2024-03-20 ASSESSMENT — ENCOUNTER SYMPTOMS
WHEEZING: 0
ABDOMINAL DISTENTION: 0
SHORTNESS OF BREATH: 0
COUGH: 0
CHEST TIGHTNESS: 0
BACK PAIN: 1
ABDOMINAL PAIN: 0

## 2024-03-20 NOTE — PROGRESS NOTES
[facility]  [facility]  [address] [address]  Dept: [phone]  Dept Fax: [phone]  Loc: [phone]  PROGRESS NOTE      VisitDate: 3/20/2024    Anastasiya Gillespie is a 58 y.o. female who presents today for:     Chief Complaint   Patient presents with    Forms     Encounter for disability forms, discuss limitations    Tailbone Pain     Arthritis in coccyx area    Snoring     Has been snoring loud for a few months         Subjective:  Patient to request additional disability forms to be completed.  Reports that her previous disability forms were inadvertently sent to the wrong HR department.  Her .  Requesting additional disability form completion.  Patient also complains of continued tailbone pain for excuse consult with Ortho.  Past history is significant arthritis lower back coccyx region.  Patient also complains of worsened snoring for past several months.  Unsure of apneic episodes.  History of failed back failed back asthma anxiety depression bipolar disorder hypertension hypothyroid interstitial cystitis migraines thyroid disease.  Patient reports she is 100% unable to work in any capacity mentally and physically.          Review of Systems   Constitutional:  Positive for fatigue. Negative for activity change, appetite change, chills and fever.   Eyes:  Negative for visual disturbance.   Respiratory:  Negative for cough, chest tightness, shortness of breath and wheezing.    Cardiovascular:  Negative for chest pain, palpitations and leg swelling.   Gastrointestinal:  Negative for abdominal distention and abdominal pain.   Genitourinary:  Negative for dysuria.   Musculoskeletal:  Positive for arthralgias, back pain, neck pain and neck stiffness.   Skin: Negative.  Negative for rash.   Neurological:  Positive for headaches. Negative for dizziness and light-headedness.   Hematological:  Negative for adenopathy.

## 2024-04-22 DIAGNOSIS — F33.41 RECURRENT MAJOR DEPRESSIVE DISORDER, IN PARTIAL REMISSION (HCC): ICD-10-CM

## 2024-04-22 DIAGNOSIS — M96.1 FAILED NECK SYNDROME: ICD-10-CM

## 2024-04-22 RX ORDER — DIAZEPAM 5 MG/1
5 TABLET ORAL EVERY 8 HOURS PRN
Qty: 90 TABLET | Refills: 0 | Status: SHIPPED | OUTPATIENT
Start: 2024-04-22 | End: 2024-05-22

## 2024-05-01 ENCOUNTER — OFFICE VISIT (OUTPATIENT)
Dept: PSYCHIATRY | Age: 59
End: 2024-05-01
Payer: MEDICARE

## 2024-05-01 DIAGNOSIS — F31.81 BIPOLAR II DISORDER (HCC): Primary | ICD-10-CM

## 2024-05-01 DIAGNOSIS — F41.1 GAD (GENERALIZED ANXIETY DISORDER): ICD-10-CM

## 2024-05-01 PROCEDURE — 1036F TOBACCO NON-USER: CPT | Performed by: NURSE PRACTITIONER

## 2024-05-01 PROCEDURE — G8428 CUR MEDS NOT DOCUMENT: HCPCS | Performed by: NURSE PRACTITIONER

## 2024-05-01 PROCEDURE — NBSRV NON-BILLABLE SERVICE: Performed by: NURSE PRACTITIONER

## 2024-05-01 PROCEDURE — G8419 CALC BMI OUT NRM PARAM NOF/U: HCPCS | Performed by: NURSE PRACTITIONER

## 2024-05-01 PROCEDURE — 99214 OFFICE O/P EST MOD 30 MIN: CPT | Performed by: NURSE PRACTITIONER

## 2024-05-01 PROCEDURE — 3017F COLORECTAL CA SCREEN DOC REV: CPT | Performed by: NURSE PRACTITIONER

## 2024-05-01 RX ORDER — BUPROPION HYDROCHLORIDE 150 MG/1
450 TABLET ORAL EVERY MORNING
Qty: 90 TABLET | Refills: 3 | Status: SHIPPED | OUTPATIENT
Start: 2024-05-01

## 2024-05-01 RX ORDER — LAMOTRIGINE 200 MG/1
200 TABLET ORAL DAILY
Qty: 30 TABLET | Refills: 3 | Status: SHIPPED | OUTPATIENT
Start: 2024-05-01

## 2024-05-01 NOTE — PROGRESS NOTES
Progress Note      5-1-2024                                                                         CC: Bipolar II D/O;  ZOE     HPI  Anastasiya is seen in person for follow up and  medication management.  States her meds continue to work well.  Denies having side effects. .Denies having a rash. Good med compliance continues to be reported. Denies having depression or anxiety.  Denies feelings of harm towards self or others. Reports still dealing with with  pain and continues to report this is  her primary issue. Reports still eating well. Reports sleep is still fair due to pain. Reports has  mission trip planned in 3 weeks to [facility].  Denies need for counseling. Reeports \"God\" is her couinselor. Reports sukumar attends [employer] regularly.  Reports cullen is getting a divorce and is worried about her grandkids and plans to get them in counseling. Labs CBC, CMP, Lipids, TSH, reviewed drawn 5-15-23.  No critical abnormals noted.  Reports has labs ordered from PCP and will have sent to this office.  Reports still  being on Valium Rxed by PCP for anxiety and sleep. Client advised again Valium would not be Rxed from this office.     Past Medical Hx:  Reports allergies to Tramadol. Codeine, Percocets, Vicodin  Reports having Hypothyroidism, Migraine Headaches, HTN. DDD, Asthma, Osteoarthritis  Reports partial hysterectomy 2001     Past Psychiatric Hx:  Denies any past psych hospitalizations. Denies any past suicidal gestures. Denies ever being under care of psychiatrist. Reports receives counseling at [facility] . Currently xanax Rxed by PCP and Valium Rxed by PCP. 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  MARITAL STATUS: Currently  2nd marriage. Reports relationship is good.  OCCUPATION: Currently on

## 2024-05-14 ENCOUNTER — OFFICE VISIT (OUTPATIENT)
Dept: FAMILY MEDICINE CLINIC | Age: 59
End: 2024-05-14

## 2024-05-14 VITALS
WEIGHT: 142 LBS | HEART RATE: 92 BPM | BODY MASS INDEX: 27.73 KG/M2 | RESPIRATION RATE: 14 BRPM | DIASTOLIC BLOOD PRESSURE: 90 MMHG | TEMPERATURE: 98.8 F | SYSTOLIC BLOOD PRESSURE: 140 MMHG

## 2024-05-14 DIAGNOSIS — J40 BRONCHITIS: ICD-10-CM

## 2024-05-14 DIAGNOSIS — F31.70 BIPOLAR DISORDER IN PARTIAL REMISSION, MOST RECENT EPISODE UNSPECIFIED TYPE (HCC): ICD-10-CM

## 2024-05-14 DIAGNOSIS — J01.90 ACUTE SINUSITIS, RECURRENCE NOT SPECIFIED, UNSPECIFIED LOCATION: Primary | ICD-10-CM

## 2024-05-14 DIAGNOSIS — J02.9 ACUTE PHARYNGITIS, UNSPECIFIED ETIOLOGY: ICD-10-CM

## 2024-05-14 DIAGNOSIS — L65.9 HAIR LOSS: ICD-10-CM

## 2024-05-14 DIAGNOSIS — J45.20 MILD INTERMITTENT REACTIVE AIRWAY DISEASE WITHOUT COMPLICATION: ICD-10-CM

## 2024-05-14 DIAGNOSIS — Z13.220 SCREENING CHOLESTEROL LEVEL: ICD-10-CM

## 2024-05-14 DIAGNOSIS — E03.9 HYPOTHYROIDISM, UNSPECIFIED TYPE: ICD-10-CM

## 2024-05-14 DIAGNOSIS — M96.1 FAILED NECK SYNDROME: ICD-10-CM

## 2024-05-14 DIAGNOSIS — L60.3 BRITTLE NAILS: ICD-10-CM

## 2024-05-14 DIAGNOSIS — F33.41 RECURRENT MAJOR DEPRESSIVE DISORDER, IN PARTIAL REMISSION (HCC): ICD-10-CM

## 2024-05-14 DIAGNOSIS — H65.03 BILATERAL ACUTE SEROUS OTITIS MEDIA, RECURRENCE NOT SPECIFIED: ICD-10-CM

## 2024-05-14 DIAGNOSIS — I10 ESSENTIAL HYPERTENSION: ICD-10-CM

## 2024-05-14 DIAGNOSIS — G44.86 CERVICOGENIC HEADACHE: ICD-10-CM

## 2024-05-14 DIAGNOSIS — R53.83 OTHER FATIGUE: ICD-10-CM

## 2024-05-14 RX ORDER — FLUOCINOLONE ACETONIDE, HYDROQUINONE, AND TRETINOIN .1; 40; .5 MG/G; MG/G; MG/G
CREAM TOPICAL
COMMUNITY
Start: 2024-04-30

## 2024-05-14 RX ORDER — PREDNISONE 10 MG/1
10 TABLET ORAL 2 TIMES DAILY
Qty: 10 TABLET | Refills: 0 | Status: SHIPPED | OUTPATIENT
Start: 2024-05-14 | End: 2024-05-19

## 2024-05-14 RX ORDER — BENZONATATE 200 MG/1
200 CAPSULE ORAL 3 TIMES DAILY PRN
Qty: 30 CAPSULE | Refills: 0 | Status: SHIPPED | OUTPATIENT
Start: 2024-05-14 | End: 2024-05-21

## 2024-05-14 RX ORDER — AZITHROMYCIN 250 MG/1
TABLET, FILM COATED ORAL
Qty: 6 TABLET | Refills: 0 | Status: SHIPPED | OUTPATIENT
Start: 2024-05-14 | End: 2024-05-24

## 2024-05-15 ENCOUNTER — NURSE ONLY (OUTPATIENT)
Dept: LAB | Age: 59
End: 2024-05-15

## 2024-05-15 DIAGNOSIS — R53.83 OTHER FATIGUE: ICD-10-CM

## 2024-05-15 DIAGNOSIS — F31.70 BIPOLAR DISORDER IN PARTIAL REMISSION, MOST RECENT EPISODE UNSPECIFIED TYPE (HCC): ICD-10-CM

## 2024-05-15 DIAGNOSIS — Z13.220 SCREENING CHOLESTEROL LEVEL: ICD-10-CM

## 2024-05-15 DIAGNOSIS — I10 ESSENTIAL HYPERTENSION: ICD-10-CM

## 2024-05-15 DIAGNOSIS — E03.9 HYPOTHYROIDISM, UNSPECIFIED TYPE: ICD-10-CM

## 2024-05-15 LAB
ALBUMIN SERPL BCG-MCNC: 4.6 G/DL (ref 3.5–5.1)
ALP SERPL-CCNC: 86 U/L (ref 38–126)
ALT SERPL W/O P-5'-P-CCNC: 32 U/L (ref 11–66)
ANION GAP SERPL CALC-SCNC: 13 MEQ/L (ref 8–16)
AST SERPL-CCNC: 28 U/L (ref 5–40)
BASOPHILS ABSOLUTE: 0 THOU/MM3 (ref 0–0.1)
BASOPHILS NFR BLD AUTO: 0.5 %
BILIRUB SERPL-MCNC: 0.2 MG/DL (ref 0.3–1.2)
BUN SERPL-MCNC: 12 MG/DL (ref 7–22)
CALCIUM SERPL-MCNC: 9.6 MG/DL (ref 8.5–10.5)
CHLORIDE SERPL-SCNC: 106 MEQ/L (ref 98–111)
CHOLEST SERPL-MCNC: 201 MG/DL (ref 100–199)
CO2 SERPL-SCNC: 22 MEQ/L (ref 23–33)
CREAT SERPL-MCNC: 0.5 MG/DL (ref 0.4–1.2)
DEPRECATED RDW RBC AUTO: 45.2 FL (ref 35–45)
EOSINOPHIL NFR BLD AUTO: 0.4 %
EOSINOPHILS ABSOLUTE: 0 THOU/MM3 (ref 0–0.4)
ERYTHROCYTE [DISTWIDTH] IN BLOOD BY AUTOMATED COUNT: 13.2 % (ref 11.5–14.5)
FOLATE SERPL-MCNC: 19.6 NG/ML (ref 4.8–24.2)
GFR SERPL CREATININE-BSD FRML MDRD: > 90 ML/MIN/1.73M2
GLUCOSE SERPL-MCNC: 97 MG/DL (ref 70–108)
HCT VFR BLD AUTO: 39.9 % (ref 37–47)
HDLC SERPL-MCNC: 73 MG/DL
HGB BLD-MCNC: 13 GM/DL (ref 12–16)
IMM GRANULOCYTES # BLD AUTO: 0.02 THOU/MM3 (ref 0–0.07)
IMM GRANULOCYTES NFR BLD AUTO: 0.4 %
LDLC SERPL CALC-MCNC: 107 MG/DL
LYMPHOCYTES ABSOLUTE: 1.7 THOU/MM3 (ref 1–4.8)
LYMPHOCYTES NFR BLD AUTO: 30.5 %
MCH RBC QN AUTO: 30.7 PG (ref 26–33)
MCHC RBC AUTO-ENTMCNC: 32.6 GM/DL (ref 32.2–35.5)
MCV RBC AUTO: 94.1 FL (ref 81–99)
MONOCYTES ABSOLUTE: 0.6 THOU/MM3 (ref 0.4–1.3)
MONOCYTES NFR BLD AUTO: 10.7 %
NEUTROPHILS ABSOLUTE: 3.2 THOU/MM3 (ref 1.8–7.7)
NEUTROPHILS NFR BLD AUTO: 57.5 %
NRBC BLD AUTO-RTO: 0 /100 WBC
PLATELET # BLD AUTO: 309 THOU/MM3 (ref 130–400)
PMV BLD AUTO: 10.7 FL (ref 9.4–12.4)
POTASSIUM SERPL-SCNC: 3.8 MEQ/L (ref 3.5–5.2)
PROT SERPL-MCNC: 7.1 G/DL (ref 6.1–8)
RBC # BLD AUTO: 4.24 MILL/MM3 (ref 4.2–5.4)
SODIUM SERPL-SCNC: 141 MEQ/L (ref 135–145)
T4 FREE SERPL-MCNC: 1.22 NG/DL (ref 0.93–1.68)
TRIGL SERPL-MCNC: 104 MG/DL (ref 0–199)
TSH SERPL DL<=0.005 MIU/L-ACNC: 0.56 UIU/ML (ref 0.4–4.2)
VIT B12 SERPL-MCNC: 401 PG/ML (ref 211–911)
WBC # BLD AUTO: 5.5 THOU/MM3 (ref 4.8–10.8)

## 2024-05-15 ASSESSMENT — ENCOUNTER SYMPTOMS
WHEEZING: 0
SORE THROAT: 1
SHORTNESS OF BREATH: 0
CHEST TIGHTNESS: 1
COUGH: 1
ABDOMINAL DISTENTION: 0

## 2024-05-15 NOTE — PROGRESS NOTES
[email or web address]    Smokeless tobacco: Never    Tobacco comments:     1 pack per week intermit for 20 yrs   Substance Use Topics    Alcohol use: No     Alcohol/week: 0.0 standard drinks of alcohol      Current Outpatient Medications   Medication Sig Dispense Refill    TRI-SAM 0.01-4-0.05 % cream APPLY CREAM TOPICALLY TO AFFECTED AREA AT BEDTIME . APPLY A VERY SMALL AMOUNT TO DARK SPOTS ON FACE AT BEDTIME UNTIL BETTER      predniSONE (DELTASONE) 10 MG tablet Take 1 tablet by mouth 2 times daily for 5 days 10 tablet 0    azithromycin (ZITHROMAX Z-SIENNA) 250 MG tablet 2 pills orally for 1 day, then 1 pill orally for 4 days 6 tablet 0    benzonatate (TESSALON) 200 MG capsule Take 1 capsule by mouth 3 times daily as needed for Cough 30 capsule 0    buPROPion (WELLBUTRIN XL) 150 MG extended release tablet Take 3 tablets by mouth every morning 90 tablet 3    lamoTRIgine (LAMICTAL) 200 MG tablet Take 1 tablet by mouth daily 30 tablet 3    diazePAM (VALIUM) 5 MG tablet Take 1 tablet by mouth every 8 hours as needed for Anxiety or Sleep (spasms) for up to 30 days. Max Daily Amount: 15 mg 90 tablet 0    albuterol sulfate HFA (PROAIR HFA) 108 (90 Base) MCG/ACT inhaler Inhale 2 puffs into the lungs every 6 hours as needed for Wheezing 1 each 3    fluticasone (FLONASE) 50 MCG/ACT nasal spray 2 sprays by Each Nostril route daily 16 g 0    levothyroxine (SYNTHROID) 100 MCG tablet Take 1 tablet by mouth daily 90 tablet 3    cyclobenzaprine (FLEXERIL) 10 MG tablet Take 1 tablet by mouth nightly as needed for Muscle spasms 30 tablet 1    polyethylene glycol (GLYCOLAX) 17 GM/SCOOP powder MIX 17 GRAMS OF POWDER IN FLUID AND DRINK ONCE DAILY 578 g 3    Probiotic Product (PROBIOTIC PO) Take by mouth daily       No current facility-administered medications for this visit.     Allergies   Allergen Reactions    Nurtec [Rimegepant Sulfate] Other (See Comments)     Makes her jittery    Abilify [Aripiprazole] Other (See Comments)     Vivid dreams

## 2024-06-17 ENCOUNTER — TELEPHONE (OUTPATIENT)
Dept: FAMILY MEDICINE CLINIC | Age: 59
End: 2024-06-17

## 2024-06-17 NOTE — TELEPHONE ENCOUNTER
Patient is requesting a renewal on her disability parking placard. Requesting a permanent one.     Mail to patient, no need to call

## 2024-06-28 ENCOUNTER — HOSPITAL ENCOUNTER (EMERGENCY)
Age: 59
Discharge: HOME | End: 2024-06-28
Payer: MEDICARE

## 2024-06-28 VITALS
WEIGHT: 140 LBS | SYSTOLIC BLOOD PRESSURE: 117 MMHG | OXYGEN SATURATION: 98 % | TEMPERATURE: 98.7 F | BODY MASS INDEX: 27.34 KG/M2 | DIASTOLIC BLOOD PRESSURE: 76 MMHG | RESPIRATION RATE: 20 BRPM | HEART RATE: 83 BPM

## 2024-06-28 DIAGNOSIS — L23.7 POISON IVY DERMATITIS: Primary | ICD-10-CM

## 2024-06-28 PROCEDURE — 99213 OFFICE O/P EST LOW 20 MIN: CPT | Performed by: NURSE PRACTITIONER

## 2024-06-28 PROCEDURE — 99213 OFFICE O/P EST LOW 20 MIN: CPT

## 2024-06-28 RX ORDER — HYDROXYZINE HYDROCHLORIDE 25 MG/1
25 TABLET, FILM COATED ORAL EVERY 8 HOURS PRN
Qty: 30 TABLET | Refills: 0 | Status: SHIPPED | OUTPATIENT
Start: 2024-06-28 | End: 2024-07-08

## 2024-06-28 RX ORDER — PREDNISONE 10 MG/1
TABLET ORAL
Qty: 40 TABLET | Refills: 0 | Status: SHIPPED | OUTPATIENT
Start: 2024-06-28 | End: 2024-07-08

## 2024-06-28 ASSESSMENT — PAIN - FUNCTIONAL ASSESSMENT: PAIN_FUNCTIONAL_ASSESSMENT: NONE - DENIES PAIN

## 2024-06-28 NOTE — ED PROVIDER NOTES
extended release tablet Take 3 tablets by mouth every morning, Disp-90 tablet, R-3Normal      lamoTRIgine (LAMICTAL) 200 MG tablet Take 1 tablet by mouth daily, Disp-30 tablet, R-3Normal      albuterol sulfate HFA (PROAIR HFA) 108 (90 Base) MCG/ACT inhaler Inhale 2 puffs into the lungs every 6 hours as needed for Wheezing, Disp-1 each, R-3Normal      levothyroxine (SYNTHROID) 100 MCG tablet Take 1 tablet by mouth daily, Disp-90 tablet, R-3Normal      cyclobenzaprine (FLEXERIL) 10 MG tablet Take 1 tablet by mouth nightly as needed for Muscle spasms, Disp-30 tablet, R-1Normal      polyethylene glycol (GLYCOLAX) 17 GM/SCOOP powder MIX 17 GRAMS OF POWDER IN FLUID AND DRINK ONCE DAILY, Disp-578 g, R-3Normal      Probiotic Product (PROBIOTIC PO) Take by mouth dailyHistorical Med             ALLERGIES     Patient is is allergic to nurtec [rimegepant sulfate], abilify [aripiprazole], cephalexin, codeine, contrast [iodides], excedrin migraine [aspirin-acetaminophen-caffeine], neurontin [gabapentin], nucynta [tapentadol hcl er], percocet [oxycodone-acetaminophen], tramadol, tylenol with codeine #3 [acetaminophen-codeine], and vicodin [hydrocodone-acetaminophen].    FAMILY HISTORY     Patient'sfamily history includes Cancer in her maternal grandfather; Colon Polyps in her mother; Diabetes in her father; High Blood Pressure in her brother, father, and sister; Liver Cancer in her maternal uncle; Other in her mother; Rheum Arthritis in her mother; Thyroid Disease in her brother and mother.    SOCIAL HISTORY     Patient  reports that she quit smoking about 17 years ago. Her smoking use included cigarettes. She started smoking about 37 years ago. She has a 5.0 pack-year smoking history. She has never used smokeless tobacco. She reports that she does not drink alcohol and does not use drugs.    PHYSICAL EXAM     ED TRIAGE VITALS  BP: 117/76, Temp: 98.7 °F (37.1 °C), Pulse: 83, Respirations: 20, SpO2: 98 %  Physical Exam  Vitals and  ivy dermatitis.  Will plan to start on prednisone and hydroxyzine.  Recommend close follow-up with primary care provider.  She denies any questions.    PATIENT REFERRED TO:  Aris Peralta, DEYANIRA - CNP  [address] [facility]  [phone]          DISCHARGE MEDICATIONS:  Discharge Medication List as of 6/28/2024  4:41 PM        START taking these medications    Details   predniSONE (DELTASONE) 10 MG tablet 40 mg's po x 4 days, then 30 mg's po x 4 days, 20 mg's x 4 days, the 10 mgs po x 4 days, Disp-40 tablet, R-0Normal      hydrOXYzine HCl (ATARAX) 25 MG tablet Take 1 tablet by mouth every 8 hours as needed for Itching, Disp-30 tablet, R-0Normal           Discharge Medication List as of 6/28/2024  4:41 PM          DEYANIRA George - SONIA Chirinos, DEYANIRA Fowler CNP  06/28/24 0711

## 2024-06-28 NOTE — DISCHARGE INSTRUCTIONS
Medications as prescribed.     Follow up with primary care provider as needed.      Report to ED with new or severe symptoms.  
Discharged

## 2024-06-28 NOTE — ED TRIAGE NOTES
Patient to room with c/o red, raised, itchy rash to left side of neck beginning one week ago. States possible exposure to poison ivy.

## 2024-07-30 ENCOUNTER — OFFICE VISIT (OUTPATIENT)
Dept: FAMILY MEDICINE CLINIC | Age: 59
End: 2024-07-30
Payer: MEDICARE

## 2024-07-30 VITALS
WEIGHT: 145 LBS | SYSTOLIC BLOOD PRESSURE: 136 MMHG | RESPIRATION RATE: 16 BRPM | HEART RATE: 88 BPM | BODY MASS INDEX: 28.32 KG/M2 | DIASTOLIC BLOOD PRESSURE: 82 MMHG | TEMPERATURE: 98.2 F

## 2024-07-30 DIAGNOSIS — F33.41 RECURRENT MAJOR DEPRESSIVE DISORDER, IN PARTIAL REMISSION (HCC): ICD-10-CM

## 2024-07-30 DIAGNOSIS — I10 ESSENTIAL HYPERTENSION: ICD-10-CM

## 2024-07-30 DIAGNOSIS — J34.3 NASAL TURBINATE HYPERTROPHY: ICD-10-CM

## 2024-07-30 DIAGNOSIS — G44.86 CERVICOGENIC HEADACHE: ICD-10-CM

## 2024-07-30 DIAGNOSIS — M96.1 FAILED NECK SYNDROME: ICD-10-CM

## 2024-07-30 DIAGNOSIS — E03.9 HYPOTHYROIDISM, UNSPECIFIED TYPE: ICD-10-CM

## 2024-07-30 DIAGNOSIS — F31.70 BIPOLAR DISORDER IN PARTIAL REMISSION, MOST RECENT EPISODE UNSPECIFIED TYPE (HCC): Primary | ICD-10-CM

## 2024-07-30 DIAGNOSIS — R06.83 SNORING: ICD-10-CM

## 2024-07-30 PROCEDURE — 3075F SYST BP GE 130 - 139MM HG: CPT | Performed by: NURSE PRACTITIONER

## 2024-07-30 PROCEDURE — 1036F TOBACCO NON-USER: CPT | Performed by: NURSE PRACTITIONER

## 2024-07-30 PROCEDURE — 3079F DIAST BP 80-89 MM HG: CPT | Performed by: NURSE PRACTITIONER

## 2024-07-30 PROCEDURE — 99214 OFFICE O/P EST MOD 30 MIN: CPT | Performed by: NURSE PRACTITIONER

## 2024-07-30 PROCEDURE — G8419 CALC BMI OUT NRM PARAM NOF/U: HCPCS | Performed by: NURSE PRACTITIONER

## 2024-07-30 PROCEDURE — 3017F COLORECTAL CA SCREEN DOC REV: CPT | Performed by: NURSE PRACTITIONER

## 2024-07-30 PROCEDURE — G8427 DOCREV CUR MEDS BY ELIG CLIN: HCPCS | Performed by: NURSE PRACTITIONER

## 2024-07-30 RX ORDER — DIAZEPAM 5 MG/1
5 TABLET ORAL EVERY 8 HOURS PRN
Qty: 90 TABLET | Refills: 0 | Status: SHIPPED | OUTPATIENT
Start: 2024-07-30 | End: 2024-08-29

## 2024-07-30 RX ORDER — LEVOTHYROXINE SODIUM 0.1 MG/1
100 TABLET ORAL DAILY
Qty: 90 TABLET | Refills: 3 | Status: SHIPPED | OUTPATIENT
Start: 2024-07-30

## 2024-07-30 RX ORDER — CYCLOBENZAPRINE HCL 10 MG
10 TABLET ORAL NIGHTLY PRN
Qty: 30 TABLET | Refills: 1 | Status: SHIPPED | OUTPATIENT
Start: 2024-07-30

## 2024-07-30 ASSESSMENT — ENCOUNTER SYMPTOMS
SHORTNESS OF BREATH: 0
COUGH: 0
CHEST TIGHTNESS: 0
ABDOMINAL PAIN: 0
ABDOMINAL DISTENTION: 0
BACK PAIN: 1
WHEEZING: 0

## 2024-07-30 NOTE — PROGRESS NOTES
[facility]  [facility]  [address] [id number]  Dept: [phone]  Dept Fax: [phone]  Loc: [phone]  PROGRESS NOTE      VisitDate: 7/30/2024    Anastasiya Gillespie is a 59 y.o. female who presents today for:     Chief Complaint   Patient presents with    Medication Refill         Subjective:  Routine 3-month/medication refills.  Medical history reviewed.  History of failed back failed back depression bipolar disorder hypertension hypothyroid.  Patient requesting consult with sleep clinic due to excessive snoring as well as consult with ENT.          Review of Systems   Constitutional:  Negative for activity change, appetite change, chills, fatigue and fever.   Eyes:  Negative for visual disturbance.   Respiratory:  Negative for cough, chest tightness, shortness of breath and wheezing.    Cardiovascular:  Negative for chest pain, palpitations and leg swelling.   Gastrointestinal:  Negative for abdominal distention and abdominal pain.   Genitourinary:  Negative for dysuria.   Musculoskeletal:  Positive for back pain, neck pain and neck stiffness. Negative for arthralgias.   Skin: Negative.  Negative for rash.   Neurological:  Negative for dizziness, light-headedness and headaches.   Hematological:  Negative for adenopathy.   Psychiatric/Behavioral:  Positive for decreased concentration and dysphoric mood.    All other systems reviewed and are negative.    Past Medical History:   Diagnosis Date    Anxiety     Asthma     Autoimmune disease (HCC)     Bipolar 1 disorder (HCC)     Blood in urine     Bronchitis     Cataracts, bilateral     Degenerative cervical disc     Degenerative disc disease     Depression     Hypertension     Hypothyroidism     Interstitial cystitis     Iritis 2013    OU      Migraines     Osteopenia     PONV (postoperative nausea and vomiting)     Shortness of breath     Thyroid disease       Past Surgical History:

## 2024-08-14 ENCOUNTER — OFFICE VISIT (OUTPATIENT)
Dept: PSYCHIATRY | Age: 59
End: 2024-08-14

## 2024-08-14 DIAGNOSIS — F31.81 BIPOLAR II DISORDER (HCC): Primary | ICD-10-CM

## 2024-08-14 DIAGNOSIS — F41.1 GAD (GENERALIZED ANXIETY DISORDER): ICD-10-CM

## 2024-08-14 RX ORDER — BUPROPION HYDROCHLORIDE 150 MG/1
450 TABLET ORAL EVERY MORNING
Qty: 90 TABLET | Refills: 3 | Status: SHIPPED | OUTPATIENT
Start: 2024-08-14

## 2024-08-14 RX ORDER — LAMOTRIGINE 200 MG/1
200 TABLET ORAL DAILY
Qty: 30 TABLET | Refills: 3 | Status: SHIPPED | OUTPATIENT
Start: 2024-08-14

## 2024-08-14 NOTE — PROGRESS NOTES
Progress Note      8-                                                                         CC: Bipolar II D/O;  ZEO     HPI  Anastasiya is seen in person for follow up and  medication management.  Reports her meds continue to work well.  Denies having side effects. .Denies having a rash. Good med compliance continues to be reported. Denies having depression or anxiety.  Denies feelings of harm towards self or others. Continues to report pain is her primary issues. .Reports still eating well. Reports sleep is still fair due to pain. Reports will be getting sleep evaluation done. Reports went on vacation to [place] with her 8 y/o granddaughter. CBC, CMP, TSH, Lipids reviewed drawn 5-15-24 reflect no critical abnormals.  being on Valium Rxed by PCP for anxiety and sleep. Client advised again Valium would not be Rxed from this office.     Past Medical Hx:  Reports allergies to Tramadol. Codeine, Percocets, Vicodin  Reports having Hypothyroidism, Migraine Headaches, HTN. DDD, Asthma, Osteoarthritis  Reports partial hysterectomy 2001     Past Psychiatric Hx:  Denies any past psych hospitalizations. Denies any past suicidal gestures. Denies ever being under care of psychiatrist. Reports receives counseling at [facility] . Currently xanax Rxed by PCP and Valium Rxed by PCP. 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  MARITAL STATUS: Currently  2nd marriage. Reports relationship is good.  OCCUPATION: Currently on Disability. Last worked at [employer] left there 2015  LEVEL OF EDUCATION: Reports having associates degree in Law Enforcement.  LIVING SITUATION: Lives with  in [place]. Has 2 grown children.  LEGAL:  Reports past 2 DUI's. Last was 1994     MSE:  Level of consciousness:

## 2024-09-18 ENCOUNTER — HOSPITAL ENCOUNTER (OUTPATIENT)
Age: 59
Discharge: HOME | End: 2024-09-18
Payer: MEDICARE

## 2024-09-18 LAB
DEPRECATED RDW RBC AUTO: 44.8 FL (ref 35–45)
ERYTHROCYTE [DISTWIDTH] IN BLOOD BY AUTOMATED COUNT: 13.2 % (ref 11.5–14.5)
FERRITIN SERPL IA-MCNC: 185 NG/ML (ref 10–291)
HCT VFR BLD AUTO: 42.2 % (ref 37–47)
HGB BLD-MCNC: 13.6 GM/DL (ref 12–16)
MCH RBC QN AUTO: 30 PG (ref 26–33)
MCHC RBC AUTO-ENTMCNC: 32.2 GM/DL (ref 32.2–35.5)
MCV RBC AUTO: 93.2 FL (ref 81–99)
PLATELET # BLD AUTO: 319 THOU/MM3 (ref 130–400)
PMV BLD AUTO: 10.9 FL (ref 9.4–12.4)
RBC # BLD AUTO: 4.53 MILL/MM3 (ref 4.2–5.4)
WBC # BLD AUTO: 5.8 THOU/MM3 (ref 4.8–10.8)

## 2024-09-18 PROCEDURE — 82728 ASSAY OF FERRITIN: CPT

## 2024-09-18 PROCEDURE — 36415 COLL VENOUS BLD VENIPUNCTURE: CPT

## 2024-09-18 PROCEDURE — 85027 COMPLETE CBC AUTOMATED: CPT

## 2024-09-18 PROCEDURE — 86038 ANTINUCLEAR ANTIBODIES: CPT

## 2024-09-20 LAB — NUCLEAR IGG SER QL IA: NORMAL

## 2024-10-07 ENCOUNTER — TELEPHONE (OUTPATIENT)
Dept: FAMILY MEDICINE CLINIC | Age: 59
End: 2024-10-07

## 2024-10-07 NOTE — TELEPHONE ENCOUNTER
R hip pain that has been consistent for 2 days; she states this is from arthritis, requesting steroid for this issue. Please advise

## 2024-10-09 ENCOUNTER — OFFICE VISIT (OUTPATIENT)
Dept: FAMILY MEDICINE CLINIC | Age: 59
End: 2024-10-09

## 2024-10-09 VITALS
OXYGEN SATURATION: 99 % | SYSTOLIC BLOOD PRESSURE: 140 MMHG | RESPIRATION RATE: 20 BRPM | DIASTOLIC BLOOD PRESSURE: 90 MMHG | HEART RATE: 87 BPM | WEIGHT: 146 LBS | BODY MASS INDEX: 27.56 KG/M2 | HEIGHT: 61 IN

## 2024-10-09 DIAGNOSIS — M96.1 FAILED BACK SYNDROME: ICD-10-CM

## 2024-10-09 DIAGNOSIS — M96.1 FAILED NECK SYNDROME: ICD-10-CM

## 2024-10-09 DIAGNOSIS — E03.9 HYPOTHYROIDISM, UNSPECIFIED TYPE: ICD-10-CM

## 2024-10-09 DIAGNOSIS — F33.41 RECURRENT MAJOR DEPRESSIVE DISORDER, IN PARTIAL REMISSION (HCC): ICD-10-CM

## 2024-10-09 DIAGNOSIS — M54.31 RIGHT SIDED SCIATICA: ICD-10-CM

## 2024-10-09 DIAGNOSIS — I10 ESSENTIAL HYPERTENSION: ICD-10-CM

## 2024-10-09 DIAGNOSIS — Z00.00 INITIAL MEDICARE ANNUAL WELLNESS VISIT: Primary | ICD-10-CM

## 2024-10-09 DIAGNOSIS — M54.50 CHRONIC MIDLINE LOW BACK PAIN, UNSPECIFIED WHETHER SCIATICA PRESENT: ICD-10-CM

## 2024-10-09 DIAGNOSIS — G89.29 CHRONIC MIDLINE LOW BACK PAIN, UNSPECIFIED WHETHER SCIATICA PRESENT: ICD-10-CM

## 2024-10-09 DIAGNOSIS — F31.70 BIPOLAR DISORDER IN PARTIAL REMISSION, MOST RECENT EPISODE UNSPECIFIED TYPE (HCC): ICD-10-CM

## 2024-10-09 RX ORDER — CYCLOBENZAPRINE HCL 10 MG
10 TABLET ORAL 2 TIMES DAILY PRN
Qty: 60 TABLET | Refills: 0 | Status: SHIPPED | OUTPATIENT
Start: 2024-10-09 | End: 2024-11-08

## 2024-10-09 RX ORDER — KETOROLAC TROMETHAMINE 30 MG/ML
30 INJECTION, SOLUTION INTRAMUSCULAR; INTRAVENOUS ONCE
Status: COMPLETED | OUTPATIENT
Start: 2024-10-09 | End: 2024-10-09

## 2024-10-09 RX ORDER — PREDNISONE 5 MG/1
TABLET ORAL
Qty: 30 TABLET | Refills: 0 | Status: SHIPPED | OUTPATIENT
Start: 2024-10-09 | End: 2024-10-19

## 2024-10-09 RX ORDER — LIDOCAINE 50 MG/G
2 PATCH TOPICAL DAILY
Qty: 60 PATCH | Refills: 1 | Status: SHIPPED | OUTPATIENT
Start: 2024-10-09 | End: 2024-11-08

## 2024-10-09 RX ORDER — METHYLPREDNISOLONE ACETATE 80 MG/ML
120 INJECTION, SUSPENSION INTRA-ARTICULAR; INTRALESIONAL; INTRAMUSCULAR; SOFT TISSUE ONCE
Status: COMPLETED | OUTPATIENT
Start: 2024-10-09 | End: 2024-10-09

## 2024-10-09 RX ADMIN — KETOROLAC TROMETHAMINE 30 MG: 30 INJECTION, SOLUTION INTRAMUSCULAR; INTRAVENOUS at 14:03

## 2024-10-09 RX ADMIN — METHYLPREDNISOLONE ACETATE 120 MG: 80 INJECTION, SUSPENSION INTRA-ARTICULAR; INTRALESIONAL; INTRAMUSCULAR; SOFT TISSUE at 14:04

## 2024-10-09 ASSESSMENT — COLUMBIA-SUICIDE SEVERITY RATING SCALE - C-SSRS
2. HAVE YOU ACTUALLY HAD ANY THOUGHTS OF KILLING YOURSELF?: NO
6. HAVE YOU EVER DONE ANYTHING, STARTED TO DO ANYTHING, OR PREPARED TO DO ANYTHING TO END YOUR LIFE?: NO
1. WITHIN THE PAST MONTH, HAVE YOU WISHED YOU WERE DEAD OR WISHED YOU COULD GO TO SLEEP AND NOT WAKE UP?: NO

## 2024-10-09 ASSESSMENT — PATIENT HEALTH QUESTIONNAIRE - PHQ9
SUM OF ALL RESPONSES TO PHQ QUESTIONS 1-9: 19
5. POOR APPETITE OR OVEREATING: NEARLY EVERY DAY
2. FEELING DOWN, DEPRESSED OR HOPELESS: MORE THAN HALF THE DAYS
3. TROUBLE FALLING OR STAYING ASLEEP: NEARLY EVERY DAY
1. LITTLE INTEREST OR PLEASURE IN DOING THINGS: MORE THAN HALF THE DAYS
8. MOVING OR SPEAKING SO SLOWLY THAT OTHER PEOPLE COULD HAVE NOTICED. OR THE OPPOSITE, BEING SO FIGETY OR RESTLESS THAT YOU HAVE BEEN MOVING AROUND A LOT MORE THAN USUAL: NEARLY EVERY DAY
SUM OF ALL RESPONSES TO PHQ9 QUESTIONS 1 & 2: 4
9. THOUGHTS THAT YOU WOULD BE BETTER OFF DEAD, OR OF HURTING YOURSELF: NOT AT ALL
SUM OF ALL RESPONSES TO PHQ QUESTIONS 1-9: 19
SUM OF ALL RESPONSES TO PHQ QUESTIONS 1-9: 19
4. FEELING TIRED OR HAVING LITTLE ENERGY: NEARLY EVERY DAY
SUM OF ALL RESPONSES TO PHQ QUESTIONS 1-9: 19
6. FEELING BAD ABOUT YOURSELF - OR THAT YOU ARE A FAILURE OR HAVE LET YOURSELF OR YOUR FAMILY DOWN: NOT AT ALL
10. IF YOU CHECKED OFF ANY PROBLEMS, HOW DIFFICULT HAVE THESE PROBLEMS MADE IT FOR YOU TO DO YOUR WORK, TAKE CARE OF THINGS AT HOME, OR GET ALONG WITH OTHER PEOPLE: VERY DIFFICULT

## 2024-10-09 ASSESSMENT — LIFESTYLE VARIABLES
HOW OFTEN DO YOU HAVE A DRINK CONTAINING ALCOHOL: NEVER
HOW MANY STANDARD DRINKS CONTAINING ALCOHOL DO YOU HAVE ON A TYPICAL DAY: PATIENT DOES NOT DRINK

## 2024-10-09 NOTE — PROGRESS NOTES
Administrations This Visit       ketorolac (TORADOL) injection 30 mg       Admin Date  10/09/2024  14:03 Action  Given Dose  30 mg Route  IntraMUSCular Site  Ventrogluteal Right Documented By  Maddy Groves CMA ([facility])    NDC: 93776-497-01    Lot#: [id number]    : [facility]    Patient Supplied?: No              methylPREDNISolone acetate (DEPO-MEDROL) injection 120 mg       Admin Date  10/09/2024  14:04 Action  Given Dose  120 mg Route  IntraMUSCular Site  Ventrogluteal Left  Documented By  Maddy Groves CMA ([facility])    NDC: [address]-7    Lot#: [id number]    : [employer]    Patient Supplied?: No                    
([facility])        4. Essential hypertension        5. Recurrent major depressive disorder, in partial remission ([facility])        6. Hypothyroidism, unspecified type        7. Chronic midline low back pain, unspecified whether sciatica present        8. Failed back syndrome        9. Right sided sciatica  methylPREDNISolone acetate (DEPO-MEDROL) injection 120 mg    ketorolac (TORADOL) injection 30 mg        HCC depression bipolar disorder stable routine consultation with psychiatry.  Continue current meds     Outpatient Encounter Medications as of 10/9/2024   Medication Sig Dispense Refill    predniSONE (DELTASONE) 5 MG tablet 4 po qd for 3 days, then 3 po qd for 3 days, then 2 po qd for 3 days, then 1 po qd for 3 days 30 tablet 0    lidocaine (LIDODERM) 5 % Place 2 patches onto the skin daily 12 hours on, 12 hours off. 60 patch 1    cyclobenzaprine (FLEXERIL) 10 MG tablet Take 1 tablet by mouth 2 times daily as needed for Muscle spasms 60 tablet 0    buPROPion (WELLBUTRIN XL) 150 MG extended release tablet Take 3 tablets by mouth every morning 90 tablet 3    lamoTRIgine (LAMICTAL) 200 MG tablet Take 1 tablet by mouth daily 30 tablet 3    cyclobenzaprine (FLEXERIL) 10 MG tablet Take 1 tablet by mouth nightly as needed for Muscle spasms 30 tablet 1    levothyroxine (SYNTHROID) 100 MCG tablet Take 1 tablet by mouth daily 90 tablet 3    TRI-SAM 0.01-4-0.05 % cream APPLY CREAM TOPICALLY TO AFFECTED AREA AT BEDTIME . APPLY A VERY SMALL AMOUNT TO DARK SPOTS ON FACE AT BEDTIME UNTIL BETTER      albuterol sulfate HFA (PROAIR HFA) 108 (90 Base) MCG/ACT inhaler Inhale 2 puffs into the lungs every 6 hours as needed for Wheezing 1 each 3    fluticasone (FLONASE) 50 MCG/ACT nasal spray 2 sprays by Each Nostril route daily 16 g 0    polyethylene glycol (GLYCOLAX) 17 GM/SCOOP powder MIX 17 GRAMS OF POWDER IN FLUID AND DRINK ONCE DAILY 578 g 3    Probiotic Product (PROBIOTIC PO) Take by mouth daily      [] methylPREDNISolone acetate

## 2024-10-21 DIAGNOSIS — F33.41 RECURRENT MAJOR DEPRESSIVE DISORDER, IN PARTIAL REMISSION (HCC): ICD-10-CM

## 2024-10-21 DIAGNOSIS — M96.1 FAILED NECK SYNDROME: ICD-10-CM

## 2024-10-21 RX ORDER — DIAZEPAM 5 MG/1
5 TABLET ORAL EVERY 8 HOURS PRN
Qty: 90 TABLET | Refills: 0 | Status: SHIPPED | OUTPATIENT
Start: 2024-10-21 | End: 2024-11-20

## 2024-11-13 NOTE — TELEPHONE ENCOUNTER
Anastasiya called into the office leaving a VM stating that she needs a refill on her Lamictal 200mg;#30 with 3 refills and Wellbutrin XL 150mg;#90 with 2 refills; last with a start date of 08/14/24.    Medication is pending your approval for a 30 day supply with 0 refills; she is scheduled to return on 12/16/24. Last seen on 08/14/24.

## 2024-11-15 RX ORDER — LAMOTRIGINE 200 MG/1
200 TABLET ORAL DAILY
Qty: 30 TABLET | Refills: 0 | Status: SHIPPED | OUTPATIENT
Start: 2024-11-15

## 2024-11-15 RX ORDER — BUPROPION HYDROCHLORIDE 150 MG/1
450 TABLET ORAL EVERY MORNING
Qty: 90 TABLET | Refills: 0 | Status: SHIPPED | OUTPATIENT
Start: 2024-11-15

## 2024-12-16 ENCOUNTER — OFFICE VISIT (OUTPATIENT)
Dept: PSYCHIATRY | Age: 59
End: 2024-12-16
Payer: MEDICARE

## 2024-12-16 DIAGNOSIS — F31.81 BIPOLAR II DISORDER (HCC): Primary | ICD-10-CM

## 2024-12-16 DIAGNOSIS — F41.1 GAD (GENERALIZED ANXIETY DISORDER): ICD-10-CM

## 2024-12-16 PROCEDURE — 1036F TOBACCO NON-USER: CPT | Performed by: NURSE PRACTITIONER

## 2024-12-16 PROCEDURE — G8419 CALC BMI OUT NRM PARAM NOF/U: HCPCS | Performed by: NURSE PRACTITIONER

## 2024-12-16 PROCEDURE — 99214 OFFICE O/P EST MOD 30 MIN: CPT | Performed by: NURSE PRACTITIONER

## 2024-12-16 PROCEDURE — 3017F COLORECTAL CA SCREEN DOC REV: CPT | Performed by: NURSE PRACTITIONER

## 2024-12-16 PROCEDURE — G8484 FLU IMMUNIZE NO ADMIN: HCPCS | Performed by: NURSE PRACTITIONER

## 2024-12-16 PROCEDURE — G8428 CUR MEDS NOT DOCUMENT: HCPCS | Performed by: NURSE PRACTITIONER

## 2024-12-16 RX ORDER — BUPROPION HYDROCHLORIDE 150 MG/1
450 TABLET ORAL EVERY MORNING
Qty: 90 TABLET | Refills: 0 | Status: SHIPPED | OUTPATIENT
Start: 2024-12-16

## 2024-12-16 RX ORDER — LAMOTRIGINE 200 MG/1
200 TABLET ORAL DAILY
Qty: 30 TABLET | Refills: 0 | Status: SHIPPED | OUTPATIENT
Start: 2024-12-16

## 2024-12-16 ASSESSMENT — PATIENT HEALTH QUESTIONNAIRE - PHQ9
SUM OF ALL RESPONSES TO PHQ9 QUESTIONS 1 & 2: 0
SUM OF ALL RESPONSES TO PHQ QUESTIONS 1-9: 0
6. FEELING BAD ABOUT YOURSELF - OR THAT YOU ARE A FAILURE OR HAVE LET YOURSELF OR YOUR FAMILY DOWN: NOT AT ALL
10. IF YOU CHECKED OFF ANY PROBLEMS, HOW DIFFICULT HAVE THESE PROBLEMS MADE IT FOR YOU TO DO YOUR WORK, TAKE CARE OF THINGS AT HOME, OR GET ALONG WITH OTHER PEOPLE: NOT DIFFICULT AT ALL
3. TROUBLE FALLING OR STAYING ASLEEP: NOT AT ALL
9. THOUGHTS THAT YOU WOULD BE BETTER OFF DEAD, OR OF HURTING YOURSELF: NOT AT ALL
SUM OF ALL RESPONSES TO PHQ QUESTIONS 1-9: 0
8. MOVING OR SPEAKING SO SLOWLY THAT OTHER PEOPLE COULD HAVE NOTICED. OR THE OPPOSITE, BEING SO FIGETY OR RESTLESS THAT YOU HAVE BEEN MOVING AROUND A LOT MORE THAN USUAL: NOT AT ALL
SUM OF ALL RESPONSES TO PHQ QUESTIONS 1-9: 0
5. POOR APPETITE OR OVEREATING: NOT AT ALL
1. LITTLE INTEREST OR PLEASURE IN DOING THINGS: NOT AT ALL
SUM OF ALL RESPONSES TO PHQ QUESTIONS 1-9: 0
4. FEELING TIRED OR HAVING LITTLE ENERGY: NOT AT ALL
2. FEELING DOWN, DEPRESSED OR HOPELESS: NOT AT ALL
7. TROUBLE CONCENTRATING ON THINGS, SUCH AS READING THE NEWSPAPER OR WATCHING TELEVISION: NOT AT ALL

## 2024-12-16 ASSESSMENT — ANXIETY QUESTIONNAIRES
5. BEING SO RESTLESS THAT IT IS HARD TO SIT STILL: NOT AT ALL
1. FEELING NERVOUS, ANXIOUS, OR ON EDGE: NOT AT ALL
2. NOT BEING ABLE TO STOP OR CONTROL WORRYING: NOT AT ALL
7. FEELING AFRAID AS IF SOMETHING AWFUL MIGHT HAPPEN: NOT AT ALL
IF YOU CHECKED OFF ANY PROBLEMS ON THIS QUESTIONNAIRE, HOW DIFFICULT HAVE THESE PROBLEMS MADE IT FOR YOU TO DO YOUR WORK, TAKE CARE OF THINGS AT HOME, OR GET ALONG WITH OTHER PEOPLE: NOT DIFFICULT AT ALL
4. TROUBLE RELAXING: NOT AT ALL
3. WORRYING TOO MUCH ABOUT DIFFERENT THINGS: NOT AT ALL
GAD7 TOTAL SCORE: 0
6. BECOMING EASILY ANNOYED OR IRRITABLE: NOT AT ALL

## 2024-12-16 NOTE — PROGRESS NOTES
Progress Note      12-                                                                         CC: Bipolar II D/O;  ZOE     HPI  Anastasiya is seen in person for follow up and  medication management. States her meds are still working alright.   Denies having side effects. .Denies having a rash. Good med compliance continues to be reported. Denies having depression or anxiety.  Denies feelings of harm towards self or others. Reports still dealing with pain which reports is her primary issues. .Reports still eating well. Reports sleep remains \"fair\" due to pain.  Reports will be going to [place] for mission trip in January. Reports getting along well with . CBC, CMP, TSH, Lipids reviewed drawn 5-15-24 reflect no critical abnormals.  being on Valium Rxed by PCP for anxiety and sleep. Client advised again Valium would not be Rxed from this office.     Past Medical Hx:  Reports allergies to Tramadol. Codeine, Percocets, Vicodin  Reports having Hypothyroidism, Migraine Headaches, HTN. DDD, Asthma, Osteoarthritis  Reports partial hysterectomy 2001     Past Psychiatric Hx:  Denies any past psych hospitalizations. Denies any past suicidal gestures. Denies ever being under care of psychiatrist. Reports receives counseling at [facility] . Currently xanax Rxed by PCP and Valium Rxed by PCP. 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  MARITAL STATUS: Currently  2nd marriage. Reports relationship is good.  OCCUPATION: Currently on Disability. Last worked at [employer] left there 2015  LEVEL OF EDUCATION: Reports having associates degree in Law Enforcement.  LIVING SITUATION: Lives with  in [place]. Has 2 grown children.  LEGAL:  Reports past 2 DUI's. Last was 1994     MSE:  Level of

## 2024-12-18 DIAGNOSIS — M96.1 FAILED NECK SYNDROME: ICD-10-CM

## 2024-12-18 DIAGNOSIS — F33.41 RECURRENT MAJOR DEPRESSIVE DISORDER, IN PARTIAL REMISSION (HCC): ICD-10-CM

## 2024-12-19 RX ORDER — DIAZEPAM 5 MG/1
5 TABLET ORAL EVERY 8 HOURS PRN
Qty: 90 TABLET | Refills: 0 | Status: SHIPPED | OUTPATIENT
Start: 2024-12-19 | End: 2025-01-18

## 2024-12-23 ENCOUNTER — HOSPITAL ENCOUNTER (EMERGENCY)
Age: 59
Discharge: HOME | End: 2024-12-23
Payer: MEDICARE

## 2024-12-23 VITALS
TEMPERATURE: 98.1 F | OXYGEN SATURATION: 97 % | BODY MASS INDEX: 27.48 KG/M2 | SYSTOLIC BLOOD PRESSURE: 145 MMHG | HEART RATE: 98 BPM | RESPIRATION RATE: 14 BRPM | WEIGHT: 140 LBS | HEIGHT: 60 IN | DIASTOLIC BLOOD PRESSURE: 102 MMHG

## 2024-12-23 DIAGNOSIS — J40 SINOBRONCHITIS: Primary | ICD-10-CM

## 2024-12-23 DIAGNOSIS — J32.9 SINOBRONCHITIS: Primary | ICD-10-CM

## 2024-12-23 PROCEDURE — 99213 OFFICE O/P EST LOW 20 MIN: CPT

## 2024-12-23 PROCEDURE — 87804 INFLUENZA ASSAY W/OPTIC: CPT

## 2024-12-23 PROCEDURE — 87635 SARS-COV-2 COVID-19 AMP PRB: CPT

## 2024-12-23 PROCEDURE — 99213 OFFICE O/P EST LOW 20 MIN: CPT | Performed by: NURSE PRACTITIONER

## 2024-12-23 RX ORDER — PREDNISONE 20 MG/1
20 TABLET ORAL 2 TIMES DAILY
Qty: 10 TABLET | Refills: 0 | Status: SHIPPED | OUTPATIENT
Start: 2024-12-23 | End: 2024-12-28

## 2024-12-23 RX ORDER — DOXYCYCLINE HYCLATE 100 MG
100 TABLET ORAL 2 TIMES DAILY
Qty: 14 TABLET | Refills: 0 | Status: SHIPPED | OUTPATIENT
Start: 2024-12-23 | End: 2024-12-30

## 2024-12-23 ASSESSMENT — PAIN - FUNCTIONAL ASSESSMENT: PAIN_FUNCTIONAL_ASSESSMENT: 0-10

## 2024-12-23 ASSESSMENT — ENCOUNTER SYMPTOMS
SORE THROAT: 1
EYES NEGATIVE: 1
COUGH: 1

## 2024-12-23 ASSESSMENT — PAIN DESCRIPTION - LOCATION: LOCATION: HEAD

## 2024-12-23 ASSESSMENT — PAIN SCALES - GENERAL: PAINLEVEL_OUTOF10: 9

## 2024-12-23 NOTE — ED TRIAGE NOTES
TO room cough loss taste and smell headache chills wekness.  Repsirations  EASY AND UNLABorED. Pt rpeorts \" I dont take vaccinations

## 2024-12-23 NOTE — ED PROVIDER NOTES
[facility] URGENT CARE  UrgentCare Encounter      CHIEFCOMPLAINT       Chief Complaint   Patient presents with    Cough     Chills sweating body aches started Thanksgiving and back worse this THursday loss of taste and smell       Nurses Notes reviewed and I agree except as noted in the HPI.  HISTORY OF PRESENT ILLNESS   Anastasiya Gillespie is a 59 y.o. female who presents to urgent care with complaints of cough, chills, body aches.  Symptom onset was approximately 3 to 4 days ago.     REVIEW OF SYSTEMS     Review of Systems   Constitutional:  Positive for chills, fatigue and fever.   HENT:  Positive for congestion and sore throat.    Eyes: Negative.    Respiratory:  Positive for cough.    Cardiovascular:  Negative for chest pain.   Musculoskeletal:  Positive for myalgias.       PAST MEDICAL HISTORY         Diagnosis Date    Anxiety     Asthma     Autoimmune disease (HCC)     Bipolar 1 disorder (HCC)     Blood in urine     Bronchitis     Cataracts, bilateral     Degenerative cervical disc     Degenerative disc disease     Depression     Hypertension     Hypothyroidism     Interstitial cystitis     Iritis 2013    OU      Migraines     Osteopenia     PONV (postoperative nausea and vomiting)     Shortness of breath     Thyroid disease        SURGICAL HISTORY     Patient  has a past surgical history that includes Hysterectomy (4/02); cervical fusion (11/09); Fulguration Minor Bladder Lesion (with o (2011); Incontinence surgery; Cystoscopy (N/A, 4/25/2019); back surgery; Pain Block (Bilateral, 11/6/2019); spinal cord stimulator implantation (N/A, 11/20/2019); cervical fusion (11/20/2015); spinal cord stimulator implantation (N/A, 12/4/2019); Cystoscopy (N/A, 11/4/2021); Stimulator Surgery (N/A, 11/4/2021); and knee surgery.    CURRENT MEDICATIONS       Discharge Medication List as of 12/23/2024  2:26 PM        CONTINUE these medications which have NOT CHANGED    Details   diazePAM (VALIUM) 5 MG tablet  her brother and mother.    SOCIAL HISTORY     Patient  reports that she quit smoking about 17 years ago. Her smoking use included cigarettes. She started smoking about 38 years ago. She has a 5 pack-year smoking history. She has never used smokeless tobacco. She reports that she does not drink alcohol and does not use drugs.    PHYSICAL EXAM     ED TRIAGE VITALS  BP: (!) 145/102, Temp: 98.1 °F (36.7 °C), Pulse: 98, Respirations: 14, SpO2: 97 %  Physical Exam  Vitals and nursing note reviewed.   Constitutional:       General: She is not in acute distress.     Appearance: Normal appearance. She is not ill-appearing or toxic-appearing.   HENT:      Head: Normocephalic and atraumatic.      Right Ear: A middle ear effusion is present.      Left Ear: A middle ear effusion is present.      Nose: Congestion present. No rhinorrhea.      Right Sinus: Maxillary sinus tenderness and frontal sinus tenderness present.      Left Sinus: Maxillary sinus tenderness and frontal sinus tenderness present.      Mouth/Throat:      Mouth: Mucous membranes are moist.      Pharynx: Oropharynx is clear. Posterior oropharyngeal erythema present.   Eyes:      Extraocular Movements: Extraocular movements intact.      Conjunctiva/sclera: Conjunctivae normal.      Pupils: Pupils are equal, round, and reactive to light.   Cardiovascular:      Rate and Rhythm: Normal rate and regular rhythm.      Pulses: Normal pulses.      Heart sounds: Normal heart sounds. No murmur heard.  Pulmonary:      Effort: Pulmonary effort is normal. No respiratory distress.      Breath sounds: Normal breath sounds. No wheezing.   Abdominal:      General: Abdomen is flat.      Palpations: Abdomen is soft.      Tenderness: There is no abdominal tenderness.   Musculoskeletal:         General: No swelling or deformity. Normal range of motion.      Cervical back: Normal range of motion and neck supple.   Skin:     General: Skin is warm and dry.      Capillary Refill: Capillary

## 2024-12-23 NOTE — DISCHARGE INSTRUCTIONS
Your COVID test is negative.    Medications as prescribed.     Follow up with primary care provider as needed.      Report to ED with new or severe symptoms.

## 2025-01-14 ENCOUNTER — OFFICE VISIT (OUTPATIENT)
Dept: FAMILY MEDICINE CLINIC | Age: 60
End: 2025-01-14

## 2025-01-14 VITALS
RESPIRATION RATE: 10 BRPM | HEIGHT: 61 IN | SYSTOLIC BLOOD PRESSURE: 134 MMHG | WEIGHT: 142 LBS | BODY MASS INDEX: 26.81 KG/M2 | DIASTOLIC BLOOD PRESSURE: 84 MMHG | HEART RATE: 80 BPM

## 2025-01-14 DIAGNOSIS — F33.41 RECURRENT MAJOR DEPRESSIVE DISORDER, IN PARTIAL REMISSION (HCC): ICD-10-CM

## 2025-01-14 DIAGNOSIS — M54.16 LUMBAR RADICULOPATHY: Primary | ICD-10-CM

## 2025-01-14 DIAGNOSIS — G89.29 CHRONIC MIDLINE LOW BACK PAIN, UNSPECIFIED WHETHER SCIATICA PRESENT: ICD-10-CM

## 2025-01-14 DIAGNOSIS — M54.50 CHRONIC MIDLINE LOW BACK PAIN, UNSPECIFIED WHETHER SCIATICA PRESENT: ICD-10-CM

## 2025-01-14 DIAGNOSIS — F31.70 BIPOLAR DISORDER IN PARTIAL REMISSION, MOST RECENT EPISODE UNSPECIFIED TYPE (HCC): ICD-10-CM

## 2025-01-14 DIAGNOSIS — F31.81 BIPOLAR II DISORDER (HCC): ICD-10-CM

## 2025-01-14 DIAGNOSIS — M96.1 FAILED BACK SYNDROME: ICD-10-CM

## 2025-01-14 DIAGNOSIS — M96.1 FAILED NECK SYNDROME: ICD-10-CM

## 2025-01-14 RX ORDER — METHYLPREDNISOLONE ACETATE 80 MG/ML
160 INJECTION, SUSPENSION INTRA-ARTICULAR; INTRALESIONAL; INTRAMUSCULAR; SOFT TISSUE ONCE
Status: COMPLETED | OUTPATIENT
Start: 2025-01-14 | End: 2025-01-14

## 2025-01-14 RX ORDER — PREDNISONE 10 MG/1
TABLET ORAL
Qty: 30 TABLET | Refills: 0 | Status: SHIPPED | OUTPATIENT
Start: 2025-01-14 | End: 2025-01-24

## 2025-01-14 RX ADMIN — METHYLPREDNISOLONE ACETATE 160 MG: 80 INJECTION, SUSPENSION INTRA-ARTICULAR; INTRALESIONAL; INTRAMUSCULAR; SOFT TISSUE at 14:52

## 2025-01-14 SDOH — ECONOMIC STABILITY: FOOD INSECURITY: WITHIN THE PAST 12 MONTHS, YOU WORRIED THAT YOUR FOOD WOULD RUN OUT BEFORE YOU GOT MONEY TO BUY MORE.: NEVER TRUE

## 2025-01-14 SDOH — ECONOMIC STABILITY: FOOD INSECURITY: WITHIN THE PAST 12 MONTHS, THE FOOD YOU BOUGHT JUST DIDN'T LAST AND YOU DIDN'T HAVE MONEY TO GET MORE.: NEVER TRUE

## 2025-01-14 ASSESSMENT — PATIENT HEALTH QUESTIONNAIRE - PHQ9
SUM OF ALL RESPONSES TO PHQ QUESTIONS 1-9: 15
5. POOR APPETITE OR OVEREATING: NEARLY EVERY DAY
7. TROUBLE CONCENTRATING ON THINGS, SUCH AS READING THE NEWSPAPER OR WATCHING TELEVISION: NEARLY EVERY DAY
SUM OF ALL RESPONSES TO PHQ QUESTIONS 1-9: 15
6. FEELING BAD ABOUT YOURSELF - OR THAT YOU ARE A FAILURE OR HAVE LET YOURSELF OR YOUR FAMILY DOWN: SEVERAL DAYS
9. THOUGHTS THAT YOU WOULD BE BETTER OFF DEAD, OR OF HURTING YOURSELF: NOT AT ALL
8. MOVING OR SPEAKING SO SLOWLY THAT OTHER PEOPLE COULD HAVE NOTICED. OR THE OPPOSITE, BEING SO FIGETY OR RESTLESS THAT YOU HAVE BEEN MOVING AROUND A LOT MORE THAN USUAL: NOT AT ALL
SUM OF ALL RESPONSES TO PHQ QUESTIONS 1-9: 15
2. FEELING DOWN, DEPRESSED OR HOPELESS: SEVERAL DAYS
1. LITTLE INTEREST OR PLEASURE IN DOING THINGS: MORE THAN HALF THE DAYS
SUM OF ALL RESPONSES TO PHQ9 QUESTIONS 1 & 2: 3
4. FEELING TIRED OR HAVING LITTLE ENERGY: NEARLY EVERY DAY
10. IF YOU CHECKED OFF ANY PROBLEMS, HOW DIFFICULT HAVE THESE PROBLEMS MADE IT FOR YOU TO DO YOUR WORK, TAKE CARE OF THINGS AT HOME, OR GET ALONG WITH OTHER PEOPLE: VERY DIFFICULT
SUM OF ALL RESPONSES TO PHQ QUESTIONS 1-9: 15
3. TROUBLE FALLING OR STAYING ASLEEP: MORE THAN HALF THE DAYS

## 2025-01-15 ASSESSMENT — ENCOUNTER SYMPTOMS: BACK PAIN: 1

## 2025-01-15 NOTE — PROGRESS NOTES
Administrations This Visit       methylPREDNISolone acetate (DEPO-MEDROL) injection 160 mg       Admin Date  01/14/2025  14:52 Action  Given Dose  160 mg Route  IntraMUSCular Site  Ventrogluteal Left  Documented By  Maddy Groves CMA ([facility])    NDC: [address]-8    Lot#: [id number]    : i.Meter    Patient Supplied?: No                  
0-64 years Vaccine (1 of 2 - PCV) Never done    Hepatitis C screen  Never done    Hepatitis B vaccine (1 of 3 - 19+ 3-dose series) Never done    Shingles vaccine (1 of 2) Never done    A1C test (Diabetic or Prediabetic)  12/12/2017    Breast cancer screen  12/29/2023    Flu vaccine (1) Never done    COVID-19 Vaccine (1 - 2023-24 season) Never done    Annual Wellness Visit (Medicare)  10/10/2025    Depression Monitoring  01/14/2026    Lipids  05/15/2029    Colorectal Cancer Screen  08/06/2030    DTaP/Tdap/Td vaccine (2 - Td or Tdap) 05/15/2033    HIV screen  Completed    Hepatitis A vaccine  Aged Out    Hib vaccine  Aged Out    Polio vaccine  Aged Out    Meningococcal (ACWY) vaccine  Aged Out         Objective:  Assessment & Plan   Physical Exam  Vitals and nursing note reviewed.   Constitutional:       Appearance: Normal appearance. She is well-developed and normal weight.   HENT:      Head: Normocephalic.      Right Ear: Tympanic membrane and ear canal normal.      Left Ear: Tympanic membrane and ear canal normal.      Nose: Nose normal.      Mouth/Throat:      Pharynx: Oropharynx is clear.   Eyes:      Extraocular Movements: Extraocular movements intact.      Conjunctiva/sclera: Conjunctivae normal.   Cardiovascular:      Rate and Rhythm: Normal rate and regular rhythm.      Pulses: Normal pulses.      Heart sounds: Normal heart sounds.   Pulmonary:      Effort: Pulmonary effort is normal.      Breath sounds: Normal breath sounds.   Abdominal:      General: Bowel sounds are normal.      Palpations: Abdomen is soft.   Musculoskeletal:      Cervical back: Neck supple.      Lumbar back: Tenderness and bony tenderness present. Decreased range of motion. Negative right straight leg raise test and negative left straight leg raise test.   Skin:     General: Skin is warm and dry.   Neurological:      General: No focal deficit present.      Mental Status: She is alert and oriented to person, place, and time.      Comments:

## 2025-02-13 ENCOUNTER — TRANSCRIBE ORDERS (OUTPATIENT)
Dept: ADMINISTRATIVE | Age: 60
End: 2025-02-13

## 2025-02-13 DIAGNOSIS — M47.14 SPONDYLOSIS WITH MYELOPATHY, THORACIC REGION: Primary | ICD-10-CM

## 2025-02-13 DIAGNOSIS — M47.26 OSTEOARTHRITIS OF SPINE WITH RADICULOPATHY, LUMBAR REGION: ICD-10-CM

## 2025-02-17 ENCOUNTER — TELEPHONE (OUTPATIENT)
Dept: FAMILY MEDICINE CLINIC | Age: 60
End: 2025-02-17

## 2025-02-17 RX ORDER — PREDNISONE 5 MG/1
TABLET ORAL
Qty: 30 TABLET | Refills: 0 | Status: SHIPPED | OUTPATIENT
Start: 2025-02-17 | End: 2025-02-27

## 2025-02-17 NOTE — TELEPHONE ENCOUNTER
Patient has ortho appointment in [place] in April, has MRI scheduled at the end of March. Patient is requesting something for pain for her L leg; pain is severe and prednisone helps her the most. Cannot take stronger pain medication, is taking quarter of flexeril and that is providing no relief.

## 2025-02-18 ENCOUNTER — TELEPHONE (OUTPATIENT)
Dept: FAMILY MEDICINE CLINIC | Age: 60
End: 2025-02-18

## 2025-02-18 NOTE — TELEPHONE ENCOUNTER
Her leg pain is not any better after 3 days of prednisone. Just had depo medrol 160 on 1-14-25. Flexeril is not helping. Asking if there is anything else she can take for the pain?

## 2025-02-19 ENCOUNTER — APPOINTMENT (OUTPATIENT)
Dept: INTERVENTIONAL RADIOLOGY/VASCULAR | Age: 60
End: 2025-02-19
Payer: MEDICARE

## 2025-02-19 ENCOUNTER — APPOINTMENT (OUTPATIENT)
Dept: GENERAL RADIOLOGY | Age: 60
End: 2025-02-19
Payer: MEDICARE

## 2025-02-19 ENCOUNTER — HOSPITAL ENCOUNTER (EMERGENCY)
Age: 60
Discharge: HOME | End: 2025-02-19
Payer: MEDICARE

## 2025-02-19 VITALS
RESPIRATION RATE: 20 BRPM | HEART RATE: 79 BPM | TEMPERATURE: 97.7 F | SYSTOLIC BLOOD PRESSURE: 178 MMHG | BODY MASS INDEX: 27.38 KG/M2 | OXYGEN SATURATION: 99 % | HEIGHT: 61 IN | DIASTOLIC BLOOD PRESSURE: 92 MMHG | WEIGHT: 145 LBS

## 2025-02-19 DIAGNOSIS — M79.605 LEFT LEG PAIN: Primary | ICD-10-CM

## 2025-02-19 LAB — ECHO BSA: 1.68 M2

## 2025-02-19 PROCEDURE — 6370000000 HC RX 637 (ALT 250 FOR IP): Performed by: EMERGENCY MEDICINE

## 2025-02-19 PROCEDURE — 93971 EXTREMITY STUDY: CPT

## 2025-02-19 PROCEDURE — 6370000000 HC RX 637 (ALT 250 FOR IP)

## 2025-02-19 PROCEDURE — 99284 EMERGENCY DEPT VISIT MOD MDM: CPT

## 2025-02-19 PROCEDURE — 96374 THER/PROPH/DIAG INJ IV PUSH: CPT

## 2025-02-19 PROCEDURE — 72100 X-RAY EXAM L-S SPINE 2/3 VWS: CPT

## 2025-02-19 PROCEDURE — 96372 THER/PROPH/DIAG INJ SC/IM: CPT

## 2025-02-19 PROCEDURE — 6360000002 HC RX W HCPCS: Performed by: EMERGENCY MEDICINE

## 2025-02-19 RX ORDER — HYDROCODONE BITARTRATE AND ACETAMINOPHEN 5; 325 MG/1; MG/1
1 TABLET ORAL ONCE
Status: COMPLETED | OUTPATIENT
Start: 2025-02-19 | End: 2025-02-19

## 2025-02-19 RX ORDER — LIDOCAINE 4 G/G
1 PATCH TOPICAL ONCE
Status: DISCONTINUED | OUTPATIENT
Start: 2025-02-19 | End: 2025-02-19 | Stop reason: HOSPADM

## 2025-02-19 RX ORDER — LIDOCAINE 4 G/G
1 PATCH TOPICAL DAILY
Status: DISCONTINUED | OUTPATIENT
Start: 2025-02-19 | End: 2025-02-19

## 2025-02-19 RX ORDER — ONDANSETRON 4 MG/1
4 TABLET, ORALLY DISINTEGRATING ORAL 3 TIMES DAILY PRN
Qty: 21 TABLET | Refills: 0 | Status: SHIPPED | OUTPATIENT
Start: 2025-02-19

## 2025-02-19 RX ORDER — HYDROCODONE BITARTRATE AND ACETAMINOPHEN 5; 325 MG/1; MG/1
1 TABLET ORAL EVERY 6 HOURS PRN
Qty: 12 TABLET | Refills: 0 | Status: SHIPPED | OUTPATIENT
Start: 2025-02-19 | End: 2025-02-22

## 2025-02-19 RX ORDER — KETOROLAC TROMETHAMINE 30 MG/ML
30 INJECTION, SOLUTION INTRAMUSCULAR; INTRAVENOUS ONCE
Status: COMPLETED | OUTPATIENT
Start: 2025-02-19 | End: 2025-02-19

## 2025-02-19 RX ORDER — ORPHENADRINE CITRATE 30 MG/ML
60 INJECTION INTRAMUSCULAR; INTRAVENOUS ONCE
Status: COMPLETED | OUTPATIENT
Start: 2025-02-19 | End: 2025-02-19

## 2025-02-19 RX ADMIN — HYDROCODONE BITARTRATE AND ACETAMINOPHEN 1 TABLET: 5; 325 TABLET ORAL at 10:42

## 2025-02-19 RX ADMIN — KETOROLAC TROMETHAMINE 30 MG: 30 INJECTION, SOLUTION INTRAMUSCULAR at 06:13

## 2025-02-19 RX ADMIN — ORPHENADRINE CITRATE 60 MG: 60 INJECTION INTRAMUSCULAR; INTRAVENOUS at 06:15

## 2025-02-19 ASSESSMENT — PAIN - FUNCTIONAL ASSESSMENT: PAIN_FUNCTIONAL_ASSESSMENT: 0-10

## 2025-02-19 ASSESSMENT — PAIN SCALES - GENERAL: PAINLEVEL_OUTOF10: 10

## 2025-02-19 ASSESSMENT — PAIN DESCRIPTION - FREQUENCY: FREQUENCY: INTERMITTENT

## 2025-02-19 ASSESSMENT — PAIN DESCRIPTION - PAIN TYPE: TYPE: ACUTE PAIN

## 2025-02-19 ASSESSMENT — PAIN DESCRIPTION - LOCATION: LOCATION: BACK;LEG

## 2025-02-19 ASSESSMENT — PAIN DESCRIPTION - ORIENTATION: ORIENTATION: LEFT

## 2025-02-19 NOTE — ED PROVIDER NOTES
[facility] EMERGENCY DEPARTMENT      EMERGENCY MEDICINE     Pt Name: Anastasiya Gillespie  MRN: [id number]  Birthdate 1965  Date of evaluation: 2/19/2025  Provider: Jessica Aguirre PA-C    CHIEF COMPLAINT       Chief Complaint   Patient presents with    Back Pain    Leg Pain     HISTORY OF PRESENT ILLNESS   Ansatasiya Gillespie is a pleasant 59 y.o. female who presents to the emergency department from from home, by private vehicle for evaluation of leg pain.    Patient reports that she is having worsening left leg pain.  This has been going on for around 2 years since October 2023.  She is currently seeing a neurologist and a neurosurgeon in [place].  MRI is scheduled in March.  Patient localizes the pain on the posterior left thigh and posterior left leg, describes it as throbbing/constant, worsening over the last 3 days. Pt states this kind of pain isn't new, but it is not controlled. Patient took prednisone was prescribed by her doctor with no relief.  Patient ambulates at home without wheelchair/walker use.  She states the pain is worse with movement and touch.  She reports no relief with Tylenol, muscle relaxer.  She saw Dr. Saint Clair 1 year ago and he attributed this pain as to degenerative disc disease in the tailbone.  Patient reports of \"long-term bladder problems going on for years\".  She denies incontinence but states she has frequent hematuria and dysuria.  Patient denies fever chills, nausea vomiting, chest pain, shortness of breath.  Patient denies lumbar pain at this time.  Patient denies surgical history on her left leg.  Patient also denies trauma and injury to the left leg.     PASTMEDICAL HISTORY     Past Medical History:   Diagnosis Date    Anxiety     Asthma     Autoimmune disease     Bipolar 1 disorder (HCC)     Blood in urine     Bronchitis     Cataracts, bilateral     Degenerative cervical disc     Degenerative disc disease     Depression     Hypertension     Hypothyroidism     Interstitial cystitis

## 2025-02-19 NOTE — ED TRIAGE NOTES
Patient into the ED by wheelchair with c/o worse chronic back and leg pain. Pt reports hx of degenerative disc disease. She reports a recent consultation with a neurologist and neuro surgeon in [place]. Pt reports she has a MRI scheduled in March. She states her pain is becoming to severe. Pt reports taking prednisone, tylenol and a muscle relaxer without relief. RR easy and unlabored. No acute distress noted. VSS

## 2025-02-19 NOTE — DISCHARGE INSTRUCTIONS
Follow-up with your PCP and your neurologist as soon as possible.  Let them know you were seen in the ED.  The meantime take pain meds as prescribed.    If you ever develop fever chills, headache, nausea vomiting, intractable leg pain/swelling, heat, inability to walk, or any other serious symptoms return to the ED immediately.

## 2025-02-28 RX ORDER — BUPROPION HYDROCHLORIDE 150 MG/1
TABLET ORAL
Qty: 90 TABLET | Refills: 0 | Status: SHIPPED | OUTPATIENT
Start: 2025-02-28

## 2025-02-28 NOTE — TELEPHONE ENCOUNTER
[facility] is requesting a medication refill on Anastasiya's behalf for Wellbutrin XL 150mg;#90 with 0 refills;last with a start date of 12/16/24 and last refill date of 02/03/25.    Medication is pending your approval for a 30 day supply with 0 refills. She is scheduled to return on 03/17/25 leaving her short of medication; she was last seen on 12/16/24.

## 2025-03-10 RX ORDER — BUPROPION HYDROCHLORIDE 150 MG/1
450 TABLET ORAL EVERY MORNING
Qty: 270 TABLET | Refills: 0 | Status: SHIPPED | OUTPATIENT
Start: 2025-03-10

## 2025-03-10 RX ORDER — LAMOTRIGINE 200 MG/1
200 TABLET ORAL DAILY
Qty: 30 TABLET | Refills: 0 | Status: SHIPPED | OUTPATIENT
Start: 2025-03-10

## 2025-03-10 NOTE — TELEPHONE ENCOUNTER
Anastasiya Gillespie called requesting a refill on the following medications:  Requested Prescriptions     Pending Prescriptions Disp Refills    buPROPion (WELLBUTRIN XL) 150 MG extended release tablet 270 tablet 0     Sig: 3 tablets every morning TAKE 3 TABLETS BY MOUTH IN THE MORNING    lamoTRIgine (LAMICTAL) 200 MG tablet 30 tablet 0     Sig: Take 1 tablet by mouth daily       Date of last visit: 12/16/2024  Date of next visit (if applicable):3/17/2025    Pharmacy Name: [place], [place]      Thanks,  Desiree Archuleta MA

## 2025-04-16 ENCOUNTER — OFFICE VISIT (OUTPATIENT)
Dept: PSYCHIATRY | Age: 60
End: 2025-04-16
Payer: MEDICARE

## 2025-04-16 DIAGNOSIS — F31.81 BIPOLAR II DISORDER (HCC): Primary | ICD-10-CM

## 2025-04-16 DIAGNOSIS — F41.1 GAD (GENERALIZED ANXIETY DISORDER): ICD-10-CM

## 2025-04-16 PROCEDURE — 99214 OFFICE O/P EST MOD 30 MIN: CPT | Performed by: NURSE PRACTITIONER

## 2025-04-16 RX ORDER — BUPROPION HYDROCHLORIDE 150 MG/1
450 TABLET ORAL EVERY MORNING
Qty: 270 TABLET | Refills: 0 | Status: SHIPPED | OUTPATIENT
Start: 2025-04-16

## 2025-04-16 RX ORDER — LAMOTRIGINE 200 MG/1
200 TABLET ORAL DAILY
Qty: 90 TABLET | Refills: 0 | Status: SHIPPED | OUTPATIENT
Start: 2025-04-16

## 2025-04-16 ASSESSMENT — PATIENT HEALTH QUESTIONNAIRE - PHQ9
4. FEELING TIRED OR HAVING LITTLE ENERGY: NOT AT ALL
SUM OF ALL RESPONSES TO PHQ QUESTIONS 1-9: 0
9. THOUGHTS THAT YOU WOULD BE BETTER OFF DEAD, OR OF HURTING YOURSELF: NOT AT ALL
SUM OF ALL RESPONSES TO PHQ QUESTIONS 1-9: 0
5. POOR APPETITE OR OVEREATING: NOT AT ALL
SUM OF ALL RESPONSES TO PHQ QUESTIONS 1-9: 0
2. FEELING DOWN, DEPRESSED OR HOPELESS: NOT AT ALL
10. IF YOU CHECKED OFF ANY PROBLEMS, HOW DIFFICULT HAVE THESE PROBLEMS MADE IT FOR YOU TO DO YOUR WORK, TAKE CARE OF THINGS AT HOME, OR GET ALONG WITH OTHER PEOPLE: NOT DIFFICULT AT ALL
7. TROUBLE CONCENTRATING ON THINGS, SUCH AS READING THE NEWSPAPER OR WATCHING TELEVISION: NOT AT ALL
1. LITTLE INTEREST OR PLEASURE IN DOING THINGS: NOT AT ALL
3. TROUBLE FALLING OR STAYING ASLEEP: NOT AT ALL
8. MOVING OR SPEAKING SO SLOWLY THAT OTHER PEOPLE COULD HAVE NOTICED. OR THE OPPOSITE, BEING SO FIGETY OR RESTLESS THAT YOU HAVE BEEN MOVING AROUND A LOT MORE THAN USUAL: NOT AT ALL
SUM OF ALL RESPONSES TO PHQ QUESTIONS 1-9: 0
6. FEELING BAD ABOUT YOURSELF - OR THAT YOU ARE A FAILURE OR HAVE LET YOURSELF OR YOUR FAMILY DOWN: NOT AT ALL

## 2025-04-16 ASSESSMENT — ANXIETY QUESTIONNAIRES
7. FEELING AFRAID AS IF SOMETHING AWFUL MIGHT HAPPEN: NOT AT ALL
5. BEING SO RESTLESS THAT IT IS HARD TO SIT STILL: NOT AT ALL
GAD7 TOTAL SCORE: 0
1. FEELING NERVOUS, ANXIOUS, OR ON EDGE: NOT AT ALL
4. TROUBLE RELAXING: NOT AT ALL
6. BECOMING EASILY ANNOYED OR IRRITABLE: NOT AT ALL
3. WORRYING TOO MUCH ABOUT DIFFERENT THINGS: NOT AT ALL
2. NOT BEING ABLE TO STOP OR CONTROL WORRYING: NOT AT ALL
IF YOU CHECKED OFF ANY PROBLEMS ON THIS QUESTIONNAIRE, HOW DIFFICULT HAVE THESE PROBLEMS MADE IT FOR YOU TO DO YOUR WORK, TAKE CARE OF THINGS AT HOME, OR GET ALONG WITH OTHER PEOPLE: NOT DIFFICULT AT ALL

## 2025-04-16 NOTE — PROGRESS NOTES
MSE:  Level of consciousness: Alert  Appearance:  fair grooming   Behavior/Motor: no abnormalities noted   Attitude toward examiner: cooperative   Speech: Normal volume, goal directed, NRR  Mood: Euthymic  Affect: Reactive  Thought processes: Linear and goal directed   Suicidal Ideation: Denies suicidal ideations  Homicidal ideation: Denies homicidal ideations  Delusions: No evidence of delusions is observed  Perceptual Disturbance: Denies AH/VH;  No evidence of psychosis is observed.  Cognition: Oriented to person, place, time and situation   Concentration fair   Memory intact   Insight: Fair  Judgment: Fair     ROS:  Constitutional: Negative for fever, chills and fatigue.   HENT: Negative for ear pain, congestion, rhinorrhea and neck pain.   Eyes: Negative for pain and discharge.   Respiratory: Negative for cough, chest tightness and wheezing. Repots being on Advair for asthma  Cardiovascular: Negative for chest pain, palpitations and leg swelling.   Gastrointestinal: Negative for nausea, vomiting and diarrhea.   Genitourinary: Denies any issuies.   Musculoskeletal: Reports still having chronic back and neck  pain. Being followed by Dr Almeida.  Skin: Negative for rash.   Neurological: Negative for dizziness, weakness. Reports has migraine headaches     Impression:  Bipolar II D/O  ZOE     Plan:  Continue current meds:  Lamictal 200mg po q am    Wellbutrin XL 450mg po q am   Med education given. Anastasiya voiced understanding of education given.   Order labs CBC, BMP, Hepatic panel. TSH draw within net 4 weeks   Anastasiya advised to call 911 and or go to nearest ED if symptoms worsen or has feelings of harm towards self or others. Anastasiya voiced understanding and agreement with plan.   RTC 3 mos; sooner if needed

## 2025-05-21 ENCOUNTER — TELEPHONE (OUTPATIENT)
Dept: FAMILY MEDICINE CLINIC | Age: 60
End: 2025-05-21

## 2025-05-21 ENCOUNTER — TELEPHONE (OUTPATIENT)
Dept: ENT CLINIC | Age: 60
End: 2025-05-21

## 2025-05-21 NOTE — TELEPHONE ENCOUNTER
Patient called in requesting an appointment and would like to know if she would need a new referral after having an appointment 2/5/25 and no showing for that appointment.

## 2025-05-21 NOTE — TELEPHONE ENCOUNTER
Spoke with linda regarding protocol on referral for cancellation on new patient visit we will use the old referral.

## 2025-05-21 NOTE — TELEPHONE ENCOUNTER
----- Message from Kekebettye OLIVA sent at 5/21/2025  9:12 AM EDT -----  Regarding: ECC Message to Provider  ECC Message to Provider    Relationship to Patient: Self     Additional Information - Patient wants to know if she needs to get to see her provider before she get a refill on her valium medication. She don't want to go to the office if she is not required to go to.  --------------------------------------------------------------------------------------------------------------------------    Call Back Information: OK to leave message on voicemail  Preferred Call Back Number: Phone - [phone]

## 2025-06-02 ENCOUNTER — OFFICE VISIT (OUTPATIENT)
Dept: FAMILY MEDICINE CLINIC | Age: 60
End: 2025-06-02

## 2025-06-02 VITALS
HEIGHT: 61 IN | HEART RATE: 84 BPM | DIASTOLIC BLOOD PRESSURE: 86 MMHG | RESPIRATION RATE: 14 BRPM | BODY MASS INDEX: 26.81 KG/M2 | WEIGHT: 142 LBS | SYSTOLIC BLOOD PRESSURE: 136 MMHG

## 2025-06-02 DIAGNOSIS — F31.70 BIPOLAR DISORDER IN PARTIAL REMISSION, MOST RECENT EPISODE UNSPECIFIED TYPE: ICD-10-CM

## 2025-06-02 DIAGNOSIS — M96.1 FAILED BACK SYNDROME: Primary | ICD-10-CM

## 2025-06-02 DIAGNOSIS — Z12.31 ENCOUNTER FOR SCREENING MAMMOGRAM FOR MALIGNANT NEOPLASM OF BREAST: ICD-10-CM

## 2025-06-02 DIAGNOSIS — F41.9 ANXIETY: ICD-10-CM

## 2025-06-02 DIAGNOSIS — J45.20 MILD INTERMITTENT REACTIVE AIRWAY DISEASE WITHOUT COMPLICATION: ICD-10-CM

## 2025-06-02 DIAGNOSIS — I10 ESSENTIAL HYPERTENSION: ICD-10-CM

## 2025-06-02 DIAGNOSIS — N30.10 IC (INTERSTITIAL CYSTITIS): ICD-10-CM

## 2025-06-02 DIAGNOSIS — F33.41 RECURRENT MAJOR DEPRESSIVE DISORDER, IN PARTIAL REMISSION: ICD-10-CM

## 2025-06-02 DIAGNOSIS — E03.9 HYPOTHYROIDISM, UNSPECIFIED TYPE: ICD-10-CM

## 2025-06-02 DIAGNOSIS — M96.1 FAILED NECK SYNDROME: ICD-10-CM

## 2025-06-02 DIAGNOSIS — Z13.220 SCREENING CHOLESTEROL LEVEL: ICD-10-CM

## 2025-06-02 RX ORDER — DIAZEPAM 5 MG/1
5 TABLET ORAL EVERY 8 HOURS PRN
Qty: 90 TABLET | Refills: 0 | Status: SHIPPED | OUTPATIENT
Start: 2025-06-02 | End: 2025-07-02

## 2025-06-02 RX ORDER — LEVOTHYROXINE SODIUM 100 UG/1
100 TABLET ORAL DAILY
Qty: 90 TABLET | Refills: 3 | Status: SHIPPED | OUTPATIENT
Start: 2025-06-02

## 2025-06-02 RX ORDER — ALBUTEROL SULFATE 90 UG/1
2 INHALANT RESPIRATORY (INHALATION) EVERY 6 HOURS PRN
Qty: 1 EACH | Refills: 3 | Status: SHIPPED | OUTPATIENT
Start: 2025-06-02

## 2025-06-02 ASSESSMENT — ENCOUNTER SYMPTOMS
WHEEZING: 0
COUGH: 0
ABDOMINAL PAIN: 0
BACK PAIN: 1
ABDOMINAL DISTENTION: 0
SHORTNESS OF BREATH: 0
CHEST TIGHTNESS: 0

## 2025-06-02 NOTE — PROGRESS NOTES
Laterality Date    BACK SURGERY      multiple injections and nerve blocks at cervical and lumbar    CERVICAL FUSION      c 4,5,6    CERVICAL FUSION  2015    CYSTOSCOPY N/A 2019    CYSTOSCOPY WITH HYDRODISTENTION WITH INSTILLATION OF DMSO AND MARCAINE performed by Irineo Thompson MD at [facility] OR    CYSTOSCOPY N/A 2021    CYSTOSCOPY HYDRODISTENTION WITH DMSO, BLADDER BOTOX 100 UNITS performed by Raji Easton MD at [facility] OR    FULGURATION MINOR BLADDER LESION (W OR W/O BIOPSY)      HC PAIN BLOCK Bilateral 2019    Third occipital nerve block bilateral. Left trigger point trapezius performed by Lance Laura MD at [facility] OR    HYSTERECTOMY (CERVIX STATUS UNKNOWN)      INCONTINENCE SURGERY      KNEE SURGERY      SPINAL CORD STIMULATOR IMPLANTATION N/A 2019    STAGE 1 / INSERTION OF INTERSTIM DEVICE performed by Irineo Thompson MD at [facility] OR    SPINAL CORD STIMULATOR IMPLANTATION N/A 2019    STAGE II STIMULATOR INSERTION performed by Irineo Thompson MD at [facility] OR    STIMULATOR SURGERY N/A 2021    INTERSTIM REMOVAL performed by Raji Easton MD at [facility] OR     Family History   Problem Relation Age of Onset    Rheum Arthritis Mother     Thyroid Disease Mother     Colon Polyps Mother         esophageal polyps    Other Mother     High Blood Pressure Father     Diabetes Father     High Blood Pressure Sister     Thyroid Disease Brother     High Blood Pressure Brother     Liver Cancer Maternal Uncle     Cancer Maternal Grandfather         pancreatic cancer    Colon Cancer Neg Hx      Social History     Tobacco Use    Smoking status: Former     Current packs/day: 0.00     Average packs/day: 0.3 packs/day for 20.0 years (5.0 ttl pk-yrs)     Types: Cigarettes     Start date:      Quit date:      Years since quittin.4    Smokeless tobacco: Never    Tobacco comments:     1 pack per week intermit for 20 yrs   Substance Use Topics    Alcohol use: No

## 2025-07-21 ENCOUNTER — LAB (OUTPATIENT)
Dept: LAB | Age: 60
End: 2025-07-21

## 2025-07-21 DIAGNOSIS — F41.1 GAD (GENERALIZED ANXIETY DISORDER): ICD-10-CM

## 2025-07-21 DIAGNOSIS — F31.70 BIPOLAR DISORDER IN PARTIAL REMISSION, MOST RECENT EPISODE UNSPECIFIED TYPE: ICD-10-CM

## 2025-07-21 DIAGNOSIS — Z13.220 SCREENING CHOLESTEROL LEVEL: ICD-10-CM

## 2025-07-21 DIAGNOSIS — I10 ESSENTIAL HYPERTENSION: ICD-10-CM

## 2025-07-21 DIAGNOSIS — E03.9 HYPOTHYROIDISM, UNSPECIFIED TYPE: ICD-10-CM

## 2025-07-21 DIAGNOSIS — F31.81 BIPOLAR II DISORDER (HCC): ICD-10-CM

## 2025-07-21 LAB
ALBUMIN SERPL BCG-MCNC: 4.2 G/DL (ref 3.4–4.9)
ALP SERPL-CCNC: 80 U/L (ref 38–126)
ALT SERPL W/O P-5'-P-CCNC: 24 U/L (ref 10–35)
ANION GAP SERPL CALC-SCNC: 12 MEQ/L (ref 8–16)
AST SERPL-CCNC: 28 U/L (ref 10–35)
BASOPHILS ABSOLUTE: 0 THOU/MM3 (ref 0–0.1)
BASOPHILS NFR BLD AUTO: 0.6 %
BILIRUB SERPL-MCNC: 0.3 MG/DL (ref 0.3–1.2)
BUN SERPL-MCNC: 9 MG/DL (ref 8–23)
CALCIUM SERPL-MCNC: 9.3 MG/DL (ref 8.6–10)
CHLORIDE SERPL-SCNC: 109 MEQ/L (ref 98–111)
CHOLEST SERPL-MCNC: 189 MG/DL (ref 100–199)
CO2 SERPL-SCNC: 22 MEQ/L (ref 22–29)
CREAT SERPL-MCNC: 0.7 MG/DL (ref 0.5–0.9)
DEPRECATED RDW RBC AUTO: 44.5 FL (ref 35–45)
EOSINOPHIL NFR BLD AUTO: 4.3 %
EOSINOPHILS ABSOLUTE: 0.3 THOU/MM3 (ref 0–0.4)
ERYTHROCYTE [DISTWIDTH] IN BLOOD BY AUTOMATED COUNT: 12.4 % (ref 11.5–14.5)
GFR SERPL CREATININE-BSD FRML MDRD: > 90 ML/MIN/1.73M2
GLUCOSE SERPL-MCNC: 92 MG/DL (ref 74–109)
HCT VFR BLD AUTO: 43.6 % (ref 37–47)
HDLC SERPL-MCNC: 70 MG/DL
HGB BLD-MCNC: 13.8 GM/DL (ref 12–16)
IMM GRANULOCYTES # BLD AUTO: 0.03 THOU/MM3 (ref 0–0.07)
IMM GRANULOCYTES NFR BLD AUTO: 0.4 %
LDLC SERPL CALC-MCNC: 83 MG/DL
LYMPHOCYTES ABSOLUTE: 1.7 THOU/MM3 (ref 1–4.8)
LYMPHOCYTES NFR BLD AUTO: 22.1 %
MCH RBC QN AUTO: 30.6 PG (ref 26–33)
MCHC RBC AUTO-ENTMCNC: 31.7 GM/DL (ref 32.2–35.5)
MCV RBC AUTO: 96.7 FL (ref 81–99)
MONOCYTES ABSOLUTE: 0.7 THOU/MM3 (ref 0.4–1.3)
MONOCYTES NFR BLD AUTO: 9 %
NEUTROPHILS ABSOLUTE: 5 THOU/MM3 (ref 1.8–7.7)
NEUTROPHILS NFR BLD AUTO: 63.6 %
NRBC BLD AUTO-RTO: 0 /100 WBC
PLATELET # BLD AUTO: 308 THOU/MM3 (ref 130–400)
PMV BLD AUTO: 11 FL (ref 9.4–12.4)
POTASSIUM SERPL-SCNC: 4.1 MEQ/L (ref 3.5–5.2)
PROT SERPL-MCNC: 6.9 G/DL (ref 6.4–8.3)
RBC # BLD AUTO: 4.51 MILL/MM3 (ref 4.2–5.4)
SODIUM SERPL-SCNC: 143 MEQ/L (ref 135–145)
T4 FREE SERPL-MCNC: 1.5 NG/DL (ref 0.92–1.68)
TRIGL SERPL-MCNC: 181 MG/DL (ref 0–199)
TSH SERPL DL<=0.05 MIU/L-ACNC: 0.15 UIU/ML (ref 0.27–4.2)
WBC # BLD AUTO: 7.8 THOU/MM3 (ref 4.8–10.8)

## 2025-08-06 ENCOUNTER — OFFICE VISIT (OUTPATIENT)
Dept: PSYCHIATRY | Age: 60
End: 2025-08-06
Payer: MEDICARE

## 2025-08-06 DIAGNOSIS — F31.81 BIPOLAR II DISORDER (HCC): Primary | ICD-10-CM

## 2025-08-06 DIAGNOSIS — F41.1 GAD (GENERALIZED ANXIETY DISORDER): ICD-10-CM

## 2025-08-06 PROCEDURE — 99214 OFFICE O/P EST MOD 30 MIN: CPT | Performed by: NURSE PRACTITIONER

## 2025-08-06 RX ORDER — LAMOTRIGINE 25 MG/1
50 TABLET ORAL DAILY
Qty: 60 TABLET | Refills: 0 | Status: SHIPPED | OUTPATIENT
Start: 2025-08-06

## 2025-08-06 ASSESSMENT — PATIENT HEALTH QUESTIONNAIRE - PHQ9
SUM OF ALL RESPONSES TO PHQ QUESTIONS 1-9: 2
2. FEELING DOWN, DEPRESSED OR HOPELESS: SEVERAL DAYS
SUM OF ALL RESPONSES TO PHQ QUESTIONS 1-9: 2
9. THOUGHTS THAT YOU WOULD BE BETTER OFF DEAD, OR OF HURTING YOURSELF: NOT AT ALL
SUM OF ALL RESPONSES TO PHQ QUESTIONS 1-9: 2
6. FEELING BAD ABOUT YOURSELF - OR THAT YOU ARE A FAILURE OR HAVE LET YOURSELF OR YOUR FAMILY DOWN: NOT AT ALL
8. MOVING OR SPEAKING SO SLOWLY THAT OTHER PEOPLE COULD HAVE NOTICED. OR THE OPPOSITE, BEING SO FIGETY OR RESTLESS THAT YOU HAVE BEEN MOVING AROUND A LOT MORE THAN USUAL: NOT AT ALL
7. TROUBLE CONCENTRATING ON THINGS, SUCH AS READING THE NEWSPAPER OR WATCHING TELEVISION: NOT AT ALL
4. FEELING TIRED OR HAVING LITTLE ENERGY: NOT AT ALL
3. TROUBLE FALLING OR STAYING ASLEEP: NOT AT ALL
1. LITTLE INTEREST OR PLEASURE IN DOING THINGS: SEVERAL DAYS
SUM OF ALL RESPONSES TO PHQ QUESTIONS 1-9: 2
5. POOR APPETITE OR OVEREATING: NOT AT ALL
10. IF YOU CHECKED OFF ANY PROBLEMS, HOW DIFFICULT HAVE THESE PROBLEMS MADE IT FOR YOU TO DO YOUR WORK, TAKE CARE OF THINGS AT HOME, OR GET ALONG WITH OTHER PEOPLE: SOMEWHAT DIFFICULT

## 2025-08-21 ENCOUNTER — OFFICE VISIT (OUTPATIENT)
Dept: ENT CLINIC | Age: 60
End: 2025-08-21

## 2025-08-21 VITALS
HEART RATE: 79 BPM | TEMPERATURE: 98.1 F | HEIGHT: 61 IN | OXYGEN SATURATION: 98 % | WEIGHT: 145.5 LBS | BODY MASS INDEX: 27.47 KG/M2 | DIASTOLIC BLOOD PRESSURE: 82 MMHG | SYSTOLIC BLOOD PRESSURE: 132 MMHG

## 2025-08-21 DIAGNOSIS — J32.9 CHRONIC SINUSITIS, UNSPECIFIED LOCATION: Primary | ICD-10-CM

## 2025-08-21 DIAGNOSIS — R09.81 NASAL CONGESTION: ICD-10-CM

## 2025-08-21 DIAGNOSIS — R06.83 SNORING: ICD-10-CM

## 2025-08-27 ENCOUNTER — OFFICE VISIT (OUTPATIENT)
Dept: FAMILY MEDICINE CLINIC | Age: 60
End: 2025-08-27

## 2025-08-27 VITALS
DIASTOLIC BLOOD PRESSURE: 100 MMHG | HEART RATE: 79 BPM | OXYGEN SATURATION: 98 % | BODY MASS INDEX: 27.77 KG/M2 | RESPIRATION RATE: 16 BRPM | TEMPERATURE: 98 F | WEIGHT: 146.9 LBS | SYSTOLIC BLOOD PRESSURE: 148 MMHG

## 2025-08-27 DIAGNOSIS — M54.16 LUMBAR RADICULOPATHY: ICD-10-CM

## 2025-08-27 DIAGNOSIS — M96.1 FAILED BACK SYNDROME: Primary | ICD-10-CM

## 2025-08-27 DIAGNOSIS — G89.29 CHRONIC LEFT SHOULDER PAIN: ICD-10-CM

## 2025-08-27 DIAGNOSIS — M25.512 CHRONIC LEFT SHOULDER PAIN: ICD-10-CM

## 2025-08-27 DIAGNOSIS — E03.9 HYPOTHYROIDISM, UNSPECIFIED TYPE: ICD-10-CM

## 2025-08-27 DIAGNOSIS — I10 PRIMARY HYPERTENSION: ICD-10-CM

## 2025-08-27 DIAGNOSIS — F33.41 RECURRENT MAJOR DEPRESSIVE DISORDER, IN PARTIAL REMISSION: ICD-10-CM

## 2025-08-27 DIAGNOSIS — J45.20 MILD INTERMITTENT REACTIVE AIRWAY DISEASE WITHOUT COMPLICATION: ICD-10-CM

## 2025-08-27 DIAGNOSIS — R31.9 URINARY TRACT INFECTION WITH HEMATURIA, SITE UNSPECIFIED: ICD-10-CM

## 2025-08-27 DIAGNOSIS — R35.0 URINARY FREQUENCY: ICD-10-CM

## 2025-08-27 DIAGNOSIS — G44.86 CERVICOGENIC HEADACHE: ICD-10-CM

## 2025-08-27 DIAGNOSIS — N39.0 URINARY TRACT INFECTION WITH HEMATURIA, SITE UNSPECIFIED: ICD-10-CM

## 2025-08-27 DIAGNOSIS — I10 ESSENTIAL HYPERTENSION: ICD-10-CM

## 2025-08-27 DIAGNOSIS — N30.10 IC (INTERSTITIAL CYSTITIS): ICD-10-CM

## 2025-08-27 DIAGNOSIS — M54.50 CHRONIC MIDLINE LOW BACK PAIN, UNSPECIFIED WHETHER SCIATICA PRESENT: ICD-10-CM

## 2025-08-27 DIAGNOSIS — G89.29 CHRONIC MIDLINE LOW BACK PAIN, UNSPECIFIED WHETHER SCIATICA PRESENT: ICD-10-CM

## 2025-08-27 DIAGNOSIS — F31.70 BIPOLAR DISORDER IN PARTIAL REMISSION, MOST RECENT EPISODE UNSPECIFIED TYPE: ICD-10-CM

## 2025-08-27 DIAGNOSIS — M96.1 FAILED NECK SYNDROME: ICD-10-CM

## 2025-08-27 DIAGNOSIS — F41.9 ANXIETY: ICD-10-CM

## 2025-08-27 LAB
BILIRUBIN, POC: NEGATIVE
BLOOD URINE, POC: NORMAL
CLARITY, POC: NORMAL
COLOR, POC: NORMAL
GLUCOSE URINE, POC: NEGATIVE MG/DL
KETONES, POC: NEGATIVE MG/DL
LEUKOCYTE EST, POC: NORMAL
NITRITE, POC: NEGATIVE
PH, POC: 7
PROTEIN, POC: NEGATIVE MG/DL
SPECIFIC GRAVITY, POC: 1.01
UROBILINOGEN, POC: NORMAL MG/DL

## 2025-08-27 RX ORDER — METHYLPREDNISOLONE ACETATE 80 MG/ML
160 INJECTION, SUSPENSION INTRA-ARTICULAR; INTRALESIONAL; INTRAMUSCULAR; SOFT TISSUE ONCE
Status: COMPLETED | OUTPATIENT
Start: 2025-08-27 | End: 2025-08-27

## 2025-08-27 RX ORDER — CIPROFLOXACIN 500 MG/1
500 TABLET, FILM COATED ORAL 2 TIMES DAILY
Qty: 20 TABLET | Refills: 0 | Status: SHIPPED | OUTPATIENT
Start: 2025-08-27 | End: 2025-09-06

## 2025-08-27 RX ORDER — PREDNISONE 5 MG/1
TABLET ORAL
Qty: 30 TABLET | Refills: 0 | Status: SHIPPED | OUTPATIENT
Start: 2025-08-27 | End: 2025-09-06

## 2025-08-27 RX ORDER — ALBUTEROL SULFATE 90 UG/1
2 INHALANT RESPIRATORY (INHALATION) EVERY 6 HOURS PRN
Qty: 1 EACH | Refills: 3 | Status: SHIPPED | OUTPATIENT
Start: 2025-08-27

## 2025-08-27 RX ORDER — LEVOTHYROXINE SODIUM 88 UG/1
88 TABLET ORAL DAILY
Qty: 90 TABLET | Refills: 3 | Status: SHIPPED | OUTPATIENT
Start: 2025-08-27

## 2025-08-27 RX ORDER — DIAZEPAM 5 MG/1
5 TABLET ORAL EVERY 8 HOURS PRN
Qty: 90 TABLET | Refills: 0 | Status: SHIPPED | OUTPATIENT
Start: 2025-08-27 | End: 2025-09-26

## 2025-08-27 RX ORDER — LISINOPRIL 10 MG/1
10 TABLET ORAL DAILY
Qty: 30 TABLET | Refills: 3 | Status: SHIPPED | OUTPATIENT
Start: 2025-08-27

## 2025-08-27 RX ADMIN — METHYLPREDNISOLONE ACETATE 160 MG: 80 INJECTION, SUSPENSION INTRA-ARTICULAR; INTRALESIONAL; INTRAMUSCULAR; SOFT TISSUE at 13:27

## 2025-08-28 ASSESSMENT — ENCOUNTER SYMPTOMS: BACK PAIN: 1

## 2025-08-29 ENCOUNTER — HOSPITAL ENCOUNTER (OUTPATIENT)
Dept: GENERAL RADIOLOGY | Age: 60
Discharge: HOME | End: 2025-08-29
Payer: MEDICARE

## 2025-08-29 DIAGNOSIS — M25.512 CHRONIC LEFT SHOULDER PAIN: ICD-10-CM

## 2025-08-29 DIAGNOSIS — G89.29 CHRONIC LEFT SHOULDER PAIN: ICD-10-CM

## 2025-08-29 LAB
BACTERIA UR CULT: ABNORMAL
ORGANISM: ABNORMAL

## 2025-08-29 PROCEDURE — 73030 X-RAY EXAM OF SHOULDER: CPT

## 2025-09-03 ENCOUNTER — LAB (OUTPATIENT)
Dept: FAMILY MEDICINE CLINIC | Age: 60
End: 2025-09-03
Payer: MEDICARE

## 2025-09-03 ENCOUNTER — TELEMEDICINE (OUTPATIENT)
Dept: PSYCHIATRY | Age: 60
End: 2025-09-03
Payer: MEDICARE

## 2025-09-03 DIAGNOSIS — F31.81 BIPOLAR II DISORDER (HCC): Primary | ICD-10-CM

## 2025-09-03 DIAGNOSIS — F41.1 GAD (GENERALIZED ANXIETY DISORDER): ICD-10-CM

## 2025-09-03 DIAGNOSIS — M25.512 CHRONIC LEFT SHOULDER PAIN: Primary | ICD-10-CM

## 2025-09-03 DIAGNOSIS — G89.29 CHRONIC LEFT SHOULDER PAIN: Primary | ICD-10-CM

## 2025-09-03 PROCEDURE — 96372 THER/PROPH/DIAG INJ SC/IM: CPT | Performed by: NURSE PRACTITIONER

## 2025-09-03 PROCEDURE — 99214 OFFICE O/P EST MOD 30 MIN: CPT | Performed by: NURSE PRACTITIONER

## 2025-09-03 RX ORDER — LAMOTRIGINE 150 MG/1
300 TABLET ORAL DAILY
Qty: 60 TABLET | Refills: 0 | Status: SHIPPED | OUTPATIENT
Start: 2025-09-03

## 2025-09-03 RX ORDER — METHYLPREDNISOLONE ACETATE 80 MG/ML
120 INJECTION, SUSPENSION INTRA-ARTICULAR; INTRALESIONAL; INTRAMUSCULAR; SOFT TISSUE ONCE
Status: COMPLETED | OUTPATIENT
Start: 2025-09-03 | End: 2025-09-03

## 2025-09-03 RX ORDER — BUPROPION HYDROCHLORIDE 150 MG/1
450 TABLET ORAL EVERY MORNING
Qty: 270 TABLET | Refills: 0 | Status: SHIPPED | OUTPATIENT
Start: 2025-09-03

## 2025-09-03 RX ADMIN — METHYLPREDNISOLONE ACETATE 120 MG: 80 INJECTION, SUSPENSION INTRA-ARTICULAR; INTRALESIONAL; INTRAMUSCULAR; SOFT TISSUE at 13:27

## 2025-09-03 ASSESSMENT — PATIENT HEALTH QUESTIONNAIRE - PHQ9
SUM OF ALL RESPONSES TO PHQ QUESTIONS 1-9: 3
8. MOVING OR SPEAKING SO SLOWLY THAT OTHER PEOPLE COULD HAVE NOTICED. OR THE OPPOSITE, BEING SO FIGETY OR RESTLESS THAT YOU HAVE BEEN MOVING AROUND A LOT MORE THAN USUAL: NOT AT ALL
4. FEELING TIRED OR HAVING LITTLE ENERGY: NOT AT ALL
6. FEELING BAD ABOUT YOURSELF - OR THAT YOU ARE A FAILURE OR HAVE LET YOURSELF OR YOUR FAMILY DOWN: NOT AT ALL
3. TROUBLE FALLING OR STAYING ASLEEP: SEVERAL DAYS
SUM OF ALL RESPONSES TO PHQ QUESTIONS 1-9: 3
10. IF YOU CHECKED OFF ANY PROBLEMS, HOW DIFFICULT HAVE THESE PROBLEMS MADE IT FOR YOU TO DO YOUR WORK, TAKE CARE OF THINGS AT HOME, OR GET ALONG WITH OTHER PEOPLE: SOMEWHAT DIFFICULT
1. LITTLE INTEREST OR PLEASURE IN DOING THINGS: SEVERAL DAYS
7. TROUBLE CONCENTRATING ON THINGS, SUCH AS READING THE NEWSPAPER OR WATCHING TELEVISION: NOT AT ALL
2. FEELING DOWN, DEPRESSED OR HOPELESS: SEVERAL DAYS
9. THOUGHTS THAT YOU WOULD BE BETTER OFF DEAD, OR OF HURTING YOURSELF: NOT AT ALL
5. POOR APPETITE OR OVEREATING: NOT AT ALL

## (undated) DEVICE — BREAST HERNIA PACK: Brand: MEDLINE INDUSTRIES, INC.

## (undated) DEVICE — SKIN AFFIX SURG ADHESIVE 72/CS 0.55ML: Brand: MEDLINE

## (undated) DEVICE — SUTURE VCRL 2-0 L27IN ABSRB UD CTB-1 L36MM 1/2 CIR JB259

## (undated) DEVICE — SUTURE MCRYL + SZ 3-0 L27IN ABSRB UD PS1 L24MM 3/8 CIR REV MCP936H

## (undated) DEVICE — DRAPE C ARM W36XL30IN RECTANG BND BG AND TAPE

## (undated) DEVICE — SOLUTION IV IRRIG WATER 1000ML POUR BRL 2F7114

## (undated) DEVICE — GAUZE,SPONGE,4"X4",12PLY,STERILE,LF,2'S: Brand: MEDLINE

## (undated) DEVICE — SUTURE MCRYL SZ 3-0 L27IN ABSRB UD L24MM PS-1 3/8 CIR PRIM Y936H

## (undated) DEVICE — DRESSING TRNSPAR W5XL4.5IN FLM SHT SEMIPERMEABLE WIND

## (undated) DEVICE — CABLE NEUROSTIMULATOR TST STIM INTERSTIM

## (undated) DEVICE — PENCIL SMK EVAC ALL IN 1 DSGN ENH VISIBILITY IMPROVED AIR

## (undated) DEVICE — HYPODERMIC SAFETY NEEDLE: Brand: MAGELLAN

## (undated) DEVICE — BAG,BANDED,W/RUBBERBAND,STERILE,30X36: Brand: MEDLINE

## (undated) DEVICE — TOWEL,OR,DSP,ST,BLUE,STD,4/PK,20PK/CS: Brand: MEDLINE

## (undated) DEVICE — PROVE COVER: Brand: UNBRANDED

## (undated) DEVICE — CRADLE POS 3X5X24IN RASPBERRY ARM PRONE FOAM DISP

## (undated) DEVICE — PREP SOL PVP IODINE 4%  4 OZ/BTL

## (undated) DEVICE — BLADE LARYNSCP SZ 3 ENH DIR INTUB GLIDESCOPE MCGRATH MAC

## (undated) DEVICE — NEEDLE SPNL 22GA L3.5IN BLK HUB S STL REG WALL FIT STYL W/

## (undated) DEVICE — SOLUTION SURG PREP POV IOD 7.5% 4 OZ

## (undated) DEVICE — PATIENT RETURN ELECTRODE, SINGLE-USE, CONTACT QUALITY MONITORING, ADULT, WITH 9FT CORD, FOR PATIENTS WEIGING OVER 33LBS. (15KG): Brand: MEGADYNE

## (undated) DEVICE — GLOVE SURG SZ 65 THK91MIL LTX FREE SYN POLYISOPRENE

## (undated) DEVICE — CABLE PACE L25CM TWST LOK GLOB INTERSTIM

## (undated) DEVICE — SUTURE PROL 2-0 L30IN NONABSORBABLE BLU MO-6 L26MM 1/2 CIR 8417H

## (undated) DEVICE — Z DISCONTINUED BY MEDLINE USE 2711682 TRAY SKIN PREP DRY W/ PREM GLV

## (undated) DEVICE — GOWN,SIRUS,NON REINFRCD,LARGE,SET IN SL: Brand: MEDLINE

## (undated) DEVICE — GLOVE ORANGE PI 7 1/2   MSG9075

## (undated) DEVICE — 1010 S-DRAPE TOWEL DRAPE 10/BX: Brand: STERI-DRAPE™

## (undated) DEVICE — SUTURE ABSORBABLE BRAIDED 2-0 CT-1 27 IN UD VICRYL J259H

## (undated) DEVICE — C-ARMOR C-ARM EQUIPMENT COVERS CLEAR STERILE UNIVERSAL FIT 12 PER CASE: Brand: C-ARMOR

## (undated) DEVICE — COVER US PRB W5XL96IN LTX W/ GEL

## (undated) DEVICE — NEEDLE SYR 18GA L1.5IN RED PLAS HUB S STL BLNT FILL W/O

## (undated) DEVICE — SUTURE VCRL + SZ 3-0 L27IN ABSRB UD L26MM SH 1/2 CIR VCP416H

## (undated) DEVICE — Z DISCONTINUED USE 2275686 GLOVE SURG SZ 8 L12IN FNGR THK13MIL WHT ISOLEX POLYISOPRENE

## (undated) DEVICE — INTRODUCER NEUROSTIMULATOR LD FOR URIN CTRL INTERSTIM

## (undated) DEVICE — SPONGE DRN W4XL4IN RAYON/POLYESTER 6 PLY NONWOVEN PRECUT

## (undated) DEVICE — HEAD POSITIONER, SURGICAL: Brand: DEROYAL

## (undated) DEVICE — SYRINGE MED 10ML LUERLOCK TIP W/O SFTY DISP

## (undated) DEVICE — 6 ML SYRINGE LUER-LOCK TIP: Brand: MONOJECT

## (undated) DEVICE — CYSTO PACK: Brand: MEDLINE INDUSTRIES, INC.

## (undated) DEVICE — SHEET,DRAPE,3/4,53X77,STERILE: Brand: MEDLINE

## (undated) DEVICE — INTENDED FOR TISSUE SEPARATION, AND OTHER PROCEDURES THAT REQUIRE A SHARP SURGICAL BLADE TO PUNCTURE OR CUT.: Brand: BARD-PARKER ® CARBON RIB-BACK BLADES

## (undated) DEVICE — PLUG,CATHETER,DRAINAGE PROTECTOR,TUBE: Brand: MEDLINE

## (undated) DEVICE — ADHESIVE SKIN CLSR 0.7ML TOP DERMBND ADV